# Patient Record
Sex: MALE | Race: WHITE | NOT HISPANIC OR LATINO | Employment: OTHER | URBAN - METROPOLITAN AREA
[De-identification: names, ages, dates, MRNs, and addresses within clinical notes are randomized per-mention and may not be internally consistent; named-entity substitution may affect disease eponyms.]

---

## 2022-08-25 ENCOUNTER — OFFICE VISIT (OUTPATIENT)
Dept: GASTROENTEROLOGY | Facility: CLINIC | Age: 82
End: 2022-08-25
Payer: OTHER GOVERNMENT

## 2022-08-25 ENCOUNTER — LAB VISIT (OUTPATIENT)
Dept: LAB | Facility: HOSPITAL | Age: 82
End: 2022-08-25
Attending: INTERNAL MEDICINE
Payer: OTHER GOVERNMENT

## 2022-08-25 VITALS — HEIGHT: 68 IN | WEIGHT: 168.19 LBS | BODY MASS INDEX: 25.49 KG/M2

## 2022-08-25 DIAGNOSIS — K51.911 ULCERATIVE COLITIS WITH RECTAL BLEEDING, UNSPECIFIED LOCATION: ICD-10-CM

## 2022-08-25 DIAGNOSIS — K51.911 ULCERATIVE COLITIS WITH RECTAL BLEEDING, UNSPECIFIED LOCATION: Primary | ICD-10-CM

## 2022-08-25 PROCEDURE — 99999 PR PBB SHADOW E&M-NEW PATIENT-LVL III: CPT | Mod: PBBFAC,,, | Performed by: INTERNAL MEDICINE

## 2022-08-25 PROCEDURE — 99204 OFFICE O/P NEW MOD 45 MIN: CPT | Mod: S$PBB,,, | Performed by: INTERNAL MEDICINE

## 2022-08-25 PROCEDURE — 86480 TB TEST CELL IMMUN MEASURE: CPT | Performed by: INTERNAL MEDICINE

## 2022-08-25 PROCEDURE — 99204 PR OFFICE/OUTPT VISIT, NEW, LEVL IV, 45-59 MIN: ICD-10-PCS | Mod: S$PBB,,, | Performed by: INTERNAL MEDICINE

## 2022-08-25 PROCEDURE — 99203 OFFICE O/P NEW LOW 30 MIN: CPT | Mod: PBBFAC,PO | Performed by: INTERNAL MEDICINE

## 2022-08-25 PROCEDURE — 99999 PR PBB SHADOW E&M-NEW PATIENT-LVL III: ICD-10-PCS | Mod: PBBFAC,,, | Performed by: INTERNAL MEDICINE

## 2022-08-25 RX ORDER — MONTELUKAST SODIUM 4 MG/1
2 TABLET, CHEWABLE ORAL
COMMUNITY
Start: 2022-01-13 | End: 2023-01-23

## 2022-08-25 RX ORDER — AMOXICILLIN 500 MG
CAPSULE ORAL
COMMUNITY
End: 2022-08-25

## 2022-08-25 RX ORDER — FLUTICASONE PROPIONATE AND SALMETEROL 250; 50 UG/1; UG/1
POWDER RESPIRATORY (INHALATION)
COMMUNITY

## 2022-08-25 RX ORDER — FAMOTIDINE 40 MG/1
40 TABLET, FILM COATED ORAL
COMMUNITY
Start: 2022-03-07

## 2022-08-25 RX ORDER — FENTANYL/BUPIVACAINE/NS/PF 2MCG/ML-.1
PREFILLED PUMP RESERVOIR EPIDURAL
COMMUNITY
End: 2022-08-25

## 2022-08-25 RX ORDER — CLOPIDOGREL BISULFATE 75 MG/1
75 TABLET ORAL
COMMUNITY
Start: 2022-02-08

## 2022-08-25 RX ORDER — NAPROXEN SODIUM 220 MG/1
1 TABLET, FILM COATED ORAL
COMMUNITY

## 2022-08-25 RX ORDER — TAMSULOSIN HYDROCHLORIDE 0.4 MG/1
CAPSULE ORAL
COMMUNITY

## 2022-08-25 RX ORDER — VIT C/E/ZN/COPPR/LUTEIN/ZEAXAN 250MG-90MG
1000 CAPSULE ORAL
COMMUNITY

## 2022-08-25 RX ORDER — ESCITALOPRAM OXALATE 10 MG/1
1 TABLET ORAL DAILY
COMMUNITY
Start: 2022-02-23

## 2022-08-25 RX ORDER — SULFASALAZINE 500 MG/1
1000 TABLET ORAL 3 TIMES DAILY
COMMUNITY
Start: 2022-06-01 | End: 2023-01-23

## 2022-08-27 LAB
GAMMA INTERFERON BACKGROUND BLD IA-ACNC: 0.03 IU/ML
M TB IFN-G CD4+ BCKGRND COR BLD-ACNC: -0.01 IU/ML
MITOGEN IGNF BCKGRD COR BLD-ACNC: 9.97 IU/ML
TB GOLD PLUS: NEGATIVE
TB2 - NIL: 0 IU/ML

## 2022-08-27 NOTE — PROGRESS NOTES
CC:ulcerative colitis    HPI 81 y.o. male with history of GERD, hyperlipidemia, JUAN DIEGO, diverticulosis and colon polyps who presents for evaluation of left-sided chronic ulcerative colitis associated with watery and bloody diarrhea that has been progressively worsening.  He states he has had a longstanding history of ulcerative colitis since the 1960s. Reports one major flare up at that time, otherwise well controlled. On sulfasalazine for 20 years & then switched to mesalamine  which worked well. He has never been on biologic therapy.  He denies any injectable or infusion medications.  He had been doing relatively well until a few years ago when he started having diarrhea.  Previously had been seeing Dr. Pat for management.  Last colonoscopy was in April of this year.  He was tried on Uceris as well as steroids (a Medrol Dosepak).  He is currently off steroids now and on sulfasalazine total 3 g daily.  He states the sulfasalazine gives him terrible headaches.  He reports he is also on colestipol 2 tabs twice daily.  No improvement in the diarrhea has been noted. He does report an improvement in urgency.  He has diarrhea about 3 times per day as well as nighttime symptoms. He wakes up in the middle of the night with diarrhea. Last saw blood in his stool about 5 days ago.    Has medical history of CAD s/p stent 2017 at [place] and remains on ASA, Plavix. He also has cervical spine disease, DJD and has had bilateral inguinal hernia repair as well as glaucoma    Medical records reviewed. Additional history supplemented by nursing.     Colonoscopy in our system 2016 with IH, diverticulosis of SC, otherwise normal. Reports he had a colonoscopy in April and we are trying to get those records.    Past Medical History:   Diagnosis Date    Arthritis     B12 deficiency     Cholelithiasis     Chronic back pain     Colon polyp     Diverticulosis     GERD (gastroesophageal reflux disease)     Glaucoma     left eye     "Hyperlipemia     JUAN DIEGO (obstructive sleep apnea)     denies CPAP use    Skin cancer     Ulcerative colitis 1961    Vitamin D deficiency      Past Surgical History:   Procedure Laterality Date    BACK SURGERY      CATARACT EXTRACTION W/ INTRAOCULAR LENS IMPLANT Right     COLONOSCOPY  2014    COLONOSCOPY  03/25/2015    diverticulosis, otherwise normal findings, biopsies taken from 4 quadrants- see media section for biopsy report, repeat in 1 year    COLONOSCOPY N/A 6/1/2016    Procedure: COLONOSCOPY;  Surgeon: Ravindra Ervin MD;  Location: [facility];  Service: Endoscopy;  Laterality: N/A;    HERNIA REPAIR      rt inguinal    LUMBAR FUSION      L3,4,5    MULTIPLE TOOTH EXTRACTIONS      pain pump placement  04/08/2010    dilaudid/baclofen    SKIN BIOPSY      SKIN CANCER EXCISION      UPPER GASTROINTESTINAL ENDOSCOPY  10/26/2009    hiatal hernia     Social History  Social History     Tobacco Use    Smoking status: Never    Smokeless tobacco: Never   Substance Use Topics    Alcohol use: No    Drug use: No     Family History   Problem Relation Age of Onset    Ulcerative colitis Son     Colon cancer Neg Hx     Colon polyps Neg Hx      Review of Systems  General ROS: +weakness, negative for chills, fever or weight loss  Psychological ROS: negative for hallucination, depression or suicidal ideation  Ophthalmic ROS: negative for blurry vision, photophobia or eye pain  ENT ROS: negative for epistaxis, sore throat or rhinorrhea  Respiratory ROS: no cough, shortness of breath, or wheezing  Cardiovascular ROS: no chest pain or dyspnea on exertion  Gastrointestinal ROS: no abdominal pain,   +change in bowel habits, +blood in stool, +diarrhea,   Genito-Urinary ROS: no dysuria, trouble voiding, or hematuria  Musculoskeletal ROS: negative for gait disturbance or muscular weakness  Neurological ROS: no syncope or seizures; no ataxia  Dermatological ROS: negative for pruritis, rash and jaundice    Physical Examination  Ht 5' 8" " (1.727 m)   Wt 76.3 kg (168 lb 3.4 oz)   BMI 25.58 kg/m²   General appearance: alert, cooperative, no distress  HENT: Normocephalic, atraumatic, neck symmetrical, no nasal discharge   Eyes: conjunctivae/corneas clear, PERRL, EOM's intact  Lungs: no labored breathing, symmetric chest wall expansion bilaterally  Heart: regular rate and rhythm without rub; no displacement of the PMI   Abdomen: soft, non-tender; bowel sounds normoactive; no organomegaly  Extremities: extremities symmetric; no clubbing, cyanosis, or edema  Integument: Skin color, texture, turgor normal; no rashes; hair distrubution normal  Neurologic: Alert and oriented X 3, did not test gait  Psychiatric: no pressured speech; normal affect; no evidence of impaired cognition     Labs:  Lab Results   Component Value Date    WBC 10.08 08/25/2022    HGB 12.3 (L) 08/25/2022    HCT 36.5 (L) 08/25/2022    MCV 93 08/25/2022     08/25/2022     CMP  Sodium   Date Value Ref Range Status   08/25/2022 143 136 - 145 mmol/L Final     Potassium   Date Value Ref Range Status   08/25/2022 4.1 3.5 - 5.1 mmol/L Final     Chloride   Date Value Ref Range Status   08/25/2022 104 95 - 110 mmol/L Final     CO2   Date Value Ref Range Status   08/25/2022 31 (H) 23 - 29 mmol/L Final     Glucose   Date Value Ref Range Status   08/25/2022 91 70 - 110 mg/dL Final     BUN   Date Value Ref Range Status   08/25/2022 11 8 - 23 mg/dL Final     Creatinine   Date Value Ref Range Status   08/25/2022 1.2 0.5 - 1.4 mg/dL Final     Calcium   Date Value Ref Range Status   08/25/2022 9.7 8.7 - 10.5 mg/dL Final     Total Protein   Date Value Ref Range Status   08/25/2022 7.1 6.0 - 8.4 g/dL Final     Albumin   Date Value Ref Range Status   08/25/2022 4.0 3.5 - 5.2 g/dL Final     Total Bilirubin   Date Value Ref Range Status   08/25/2022 0.5 0.1 - 1.0 mg/dL Final     Comment:     For infants and newborns, interpretation of results should be based  on gestational age, weight and in agreement  with clinical  observations.    Premature Infant recommended reference ranges:  Up to 24 hours.............<8.0 mg/dL  Up to 48 hours............<12.0 mg/dL  3-5 days..................<15.0 mg/dL  6-29 days.................<15.0 mg/dL       Alkaline Phosphatase   Date Value Ref Range Status   08/25/2022 56 55 - 135 U/L Final     AST   Date Value Ref Range Status   08/25/2022 18 10 - 40 U/L Final     ALT   Date Value Ref Range Status   08/25/2022 9 (L) 10 - 44 U/L Final     Anion Gap   Date Value Ref Range Status   08/25/2022 8 8 - 16 mmol/L Final     Assessment:   Left sided ulcerative colitis  Diarrhea  Blood in stool  On Sulfasalazine 3000 mg dialy in divided doses  Headaches from sulfasalazine    Plan:   -Will obtain records from OSH for evaluation of recent colonoscopy and pathology  -Fecal calprotectin, CRP  -CBC, CMP  -Check c.diff, stool culture, ova/parasites, stool WBC   -Hepatitis serology  -TB testing  -Recommendations to follow endoscopy  -Follow up in 1 month    Lencho Ivory MD  [facility]  [address] [place] [address]  Office: [phone]  Fax: [phone]

## 2022-08-29 ENCOUNTER — TELEPHONE (OUTPATIENT)
Dept: GASTROENTEROLOGY | Facility: CLINIC | Age: 82
End: 2022-08-29
Payer: OTHER GOVERNMENT

## 2022-08-29 NOTE — TELEPHONE ENCOUNTER
Tried to return call, the aneesh was dropped.    Tried again a few minutes later and the line was busy.   He was given prep instructions at his visit on 8/25.

## 2022-08-29 NOTE — TELEPHONE ENCOUNTER
----- Message from Mann Tovar sent at 8/29/2022  9:22 AM CDT -----  Type: Needs Medical Advice  Who Called:  Patient    Pharmacy name and phone #:    Best Call Back Number: [phone]  Additional Information: Patient states that he has a colonoscopy this week but he has no prep medication.  Please call to advise

## 2022-08-31 ENCOUNTER — LAB VISIT (OUTPATIENT)
Dept: LAB | Facility: HOSPITAL | Age: 82
End: 2022-08-31
Attending: INTERNAL MEDICINE
Payer: OTHER GOVERNMENT

## 2022-08-31 DIAGNOSIS — K51.911 ULCERATIVE COLITIS WITH RECTAL BLEEDING, UNSPECIFIED LOCATION: ICD-10-CM

## 2022-08-31 LAB
C DIFF GDH STL QL: NEGATIVE
C DIFF TOX A+B STL QL IA: NEGATIVE
WBC #/AREA STL HPF: NORMAL /[HPF]

## 2022-08-31 PROCEDURE — 87045 FECES CULTURE AEROBIC BACT: CPT | Performed by: INTERNAL MEDICINE

## 2022-08-31 PROCEDURE — 87427 SHIGA-LIKE TOXIN AG IA: CPT | Performed by: INTERNAL MEDICINE

## 2022-08-31 PROCEDURE — 87046 STOOL CULTR AEROBIC BACT EA: CPT | Mod: 59 | Performed by: INTERNAL MEDICINE

## 2022-08-31 PROCEDURE — 87449 NOS EACH ORGANISM AG IA: CPT | Performed by: INTERNAL MEDICINE

## 2022-08-31 PROCEDURE — 89055 LEUKOCYTE ASSESSMENT FECAL: CPT | Performed by: INTERNAL MEDICINE

## 2022-08-31 PROCEDURE — 87209 SMEAR COMPLEX STAIN: CPT | Performed by: INTERNAL MEDICINE

## 2022-09-01 ENCOUNTER — ANESTHESIA (OUTPATIENT)
Dept: ENDOSCOPY | Facility: HOSPITAL | Age: 82
End: 2022-09-01
Payer: OTHER GOVERNMENT

## 2022-09-01 ENCOUNTER — HOSPITAL ENCOUNTER (OUTPATIENT)
Facility: HOSPITAL | Age: 82
Discharge: HOME | End: 2022-09-01
Attending: INTERNAL MEDICINE | Admitting: INTERNAL MEDICINE
Payer: OTHER GOVERNMENT

## 2022-09-01 ENCOUNTER — ANESTHESIA EVENT (OUTPATIENT)
Dept: ENDOSCOPY | Facility: HOSPITAL | Age: 82
End: 2022-09-01
Payer: OTHER GOVERNMENT

## 2022-09-01 DIAGNOSIS — K51.919 ULCERATIVE COLITIS WITH COMPLICATION: ICD-10-CM

## 2022-09-01 DIAGNOSIS — K51.819 OTHER ULCERATIVE COLITIS WITH COMPLICATION: Primary | ICD-10-CM

## 2022-09-01 LAB
E COLI SXT1 STL QL IA: NEGATIVE
E COLI SXT2 STL QL IA: NEGATIVE

## 2022-09-01 PROCEDURE — 88365 INSITU HYBRIDIZATION (FISH): CPT | Performed by: PATHOLOGY

## 2022-09-01 PROCEDURE — 63600175 PHARM REV CODE 636 W HCPCS: Mod: PO | Performed by: NURSE ANESTHETIST, CERTIFIED REGISTERED

## 2022-09-01 PROCEDURE — 88313 SPECIAL STAINS GROUP 2: CPT | Performed by: PATHOLOGY

## 2022-09-01 PROCEDURE — 88341 PR IHC OR ICC EACH ADD'L SINGLE ANTIBODY  STAINPR: ICD-10-PCS | Mod: 26,,, | Performed by: PATHOLOGY

## 2022-09-01 PROCEDURE — 45380 COLONOSCOPY AND BIOPSY: CPT | Mod: PO | Performed by: INTERNAL MEDICINE

## 2022-09-01 PROCEDURE — 00811 ANES LWR INTST NDSC NOS: CPT | Mod: PO | Performed by: INTERNAL MEDICINE

## 2022-09-01 PROCEDURE — 37000008 HC ANESTHESIA 1ST 15 MINUTES: Mod: PO | Performed by: INTERNAL MEDICINE

## 2022-09-01 PROCEDURE — 88312 SPECIAL STAINS GROUP 1: CPT | Mod: 26,,, | Performed by: PATHOLOGY

## 2022-09-01 PROCEDURE — 27201012 HC FORCEPS, HOT/COLD, DISP: Mod: PO | Performed by: INTERNAL MEDICINE

## 2022-09-01 PROCEDURE — 88365 INSITU HYBRIDIZATION (FISH): CPT | Mod: 26,,, | Performed by: PATHOLOGY

## 2022-09-01 PROCEDURE — 88313 PR  SPECIAL STAINS,GROUP II: ICD-10-PCS | Mod: 26,,, | Performed by: PATHOLOGY

## 2022-09-01 PROCEDURE — D9220A PRA ANESTHESIA: Mod: ANES,,, | Performed by: ANESTHESIOLOGY

## 2022-09-01 PROCEDURE — 88342 CHG IMMUNOCYTOCHEMISTRY: ICD-10-PCS | Mod: 26,,, | Performed by: PATHOLOGY

## 2022-09-01 PROCEDURE — 88305 TISSUE EXAM BY PATHOLOGIST: CPT | Mod: 59 | Performed by: PATHOLOGY

## 2022-09-01 PROCEDURE — 25000003 PHARM REV CODE 250: Mod: PO | Performed by: NURSE ANESTHETIST, CERTIFIED REGISTERED

## 2022-09-01 PROCEDURE — 88341 IMHCHEM/IMCYTCHM EA ADD ANTB: CPT | Mod: 59 | Performed by: PATHOLOGY

## 2022-09-01 PROCEDURE — D9220A PRA ANESTHESIA: Mod: CRNA,,, | Performed by: NURSE ANESTHETIST, CERTIFIED REGISTERED

## 2022-09-01 PROCEDURE — 63600175 PHARM REV CODE 636 W HCPCS: Mod: PO | Performed by: INTERNAL MEDICINE

## 2022-09-01 PROCEDURE — 88312 SPECIAL STAINS GROUP 1: CPT | Performed by: PATHOLOGY

## 2022-09-01 PROCEDURE — 88342 IMHCHEM/IMCYTCHM 1ST ANTB: CPT | Mod: 26,,, | Performed by: PATHOLOGY

## 2022-09-01 PROCEDURE — 45380 COLONOSCOPY AND BIOPSY: CPT | Mod: ,,, | Performed by: INTERNAL MEDICINE

## 2022-09-01 PROCEDURE — D9220A PRA ANESTHESIA: ICD-10-PCS | Mod: CRNA,,, | Performed by: NURSE ANESTHETIST, CERTIFIED REGISTERED

## 2022-09-01 PROCEDURE — 88305 TISSUE EXAM BY PATHOLOGIST: ICD-10-PCS | Mod: 26,,, | Performed by: PATHOLOGY

## 2022-09-01 PROCEDURE — 88342 IMHCHEM/IMCYTCHM 1ST ANTB: CPT | Performed by: PATHOLOGY

## 2022-09-01 PROCEDURE — 88365 PR  TISSUE HYBRIDIZATION: ICD-10-PCS | Mod: 26,,, | Performed by: PATHOLOGY

## 2022-09-01 PROCEDURE — 37000009 HC ANESTHESIA EA ADD 15 MINS: Mod: PO | Performed by: INTERNAL MEDICINE

## 2022-09-01 PROCEDURE — D9220A PRA ANESTHESIA: ICD-10-PCS | Mod: ANES,,, | Performed by: ANESTHESIOLOGY

## 2022-09-01 PROCEDURE — 88341 IMHCHEM/IMCYTCHM EA ADD ANTB: CPT | Mod: 26,,, | Performed by: PATHOLOGY

## 2022-09-01 PROCEDURE — 88305 TISSUE EXAM BY PATHOLOGIST: CPT | Mod: 26,,, | Performed by: PATHOLOGY

## 2022-09-01 PROCEDURE — 88313 SPECIAL STAINS GROUP 2: CPT | Mod: 26,,, | Performed by: PATHOLOGY

## 2022-09-01 PROCEDURE — 88312 PR  SPECIAL STAINS,GROUP I: ICD-10-PCS | Mod: 26,,, | Performed by: PATHOLOGY

## 2022-09-01 PROCEDURE — 45380 PR COLONOSCOPY,BIOPSY: ICD-10-PCS | Mod: ,,, | Performed by: INTERNAL MEDICINE

## 2022-09-01 RX ORDER — LIDOCAINE HCL/PF 100 MG/5ML
SYRINGE (ML) INTRAVENOUS
Status: DISCONTINUED | OUTPATIENT
Start: 2022-09-01 | End: 2022-09-01

## 2022-09-01 RX ORDER — PROPOFOL 10 MG/ML
VIAL (ML) INTRAVENOUS
Status: DISCONTINUED | OUTPATIENT
Start: 2022-09-01 | End: 2022-09-01

## 2022-09-01 RX ORDER — SODIUM CHLORIDE 0.9 % (FLUSH) 0.9 %
10 SYRINGE (ML) INJECTION EVERY 6 HOURS PRN
Status: DISCONTINUED | OUTPATIENT
Start: 2022-09-01 | End: 2022-09-01 | Stop reason: HOSPADM

## 2022-09-01 RX ORDER — PROPOFOL 10 MG/ML
VIAL (ML) INTRAVENOUS CONTINUOUS PRN
Status: DISCONTINUED | OUTPATIENT
Start: 2022-09-01 | End: 2022-09-01

## 2022-09-01 RX ORDER — SODIUM CHLORIDE, SODIUM LACTATE, POTASSIUM CHLORIDE, CALCIUM CHLORIDE 600; 310; 30; 20 MG/100ML; MG/100ML; MG/100ML; MG/100ML
INJECTION, SOLUTION INTRAVENOUS CONTINUOUS
Status: DISCONTINUED | OUTPATIENT
Start: 2022-09-01 | End: 2022-09-01 | Stop reason: HOSPADM

## 2022-09-01 RX ORDER — EPHEDRINE SULFATE 50 MG/ML
INJECTION, SOLUTION INTRAVENOUS
Status: DISCONTINUED | OUTPATIENT
Start: 2022-09-01 | End: 2022-09-01

## 2022-09-01 RX ADMIN — EPHEDRINE SULFATE 10 MG: 50 INJECTION, SOLUTION INTRAMUSCULAR; INTRAVENOUS; SUBCUTANEOUS at 09:09

## 2022-09-01 RX ADMIN — LIDOCAINE HYDROCHLORIDE 100 MG: 20 INJECTION, SOLUTION INTRAVENOUS at 09:09

## 2022-09-01 RX ADMIN — SODIUM CHLORIDE, SODIUM LACTATE, POTASSIUM CHLORIDE, AND CALCIUM CHLORIDE: .6; .31; .03; .02 INJECTION, SOLUTION INTRAVENOUS at 08:09

## 2022-09-01 RX ADMIN — PROPOFOL 100 MCG/KG/MIN: 10 INJECTION, EMULSION INTRAVENOUS at 09:09

## 2022-09-01 RX ADMIN — PROPOFOL 80 MG: 10 INJECTION, EMULSION INTRAVENOUS at 09:09

## 2022-09-01 NOTE — PROVATION PATIENT INSTRUCTIONS
Discharge Summary/Instructions after an Endoscopic Procedure  Patient Name: Alfie Olson  Patient MRN: [id number]  Patient YOB: 1940  Thursday, September 1, 2022  Lencho Ivory MD  Dear patient,  As a result of recent federal legislation (The Federal Cures Act), you may   receive lab or pathology results from your procedure in your [facility]   account before your physician is able to contact you. Your physician or   their representative will relay the results to you with their   recommendations at their soonest availability.  Thank you,  RESTRICTIONS:  During your procedure today, you received medications for sedation.  These   medications may affect your judgment, balance and coordination.  Therefore,   for 24 hours, you have the following restrictions:   - DO NOT drive a car, operate machinery, make legal/financial decisions,   sign important papers or drink alcohol.    ACTIVITY:  Today: no heavy lifting, straining or running due to procedural   sedation/anesthesia.  The following day: return to full activity including work.  DIET:  Eat and drink normally unless instructed otherwise.     TREATMENT FOR COMMON SIDE EFFECTS:  - Mild abdominal pain, nausea, belching, bloating or excessive gas:  rest,   eat lightly and use a heating pad.  - Sore Throat: treat with throat lozenges and/or gargle with warm salt   water.  - Because air was used during the procedure, expelling large amounts of air   from your rectum or belching is normal.  - If a bowel prep was taken, you may not have a bowel movement for 1-3 days.    This is normal.  SYMPTOMS TO WATCH FOR AND REPORT TO YOUR PHYSICIAN:  1. Abdominal pain or bloating, other than gas cramps.  2. Chest pain.  3. Back pain.  4. Signs of infection such as: chills or fever occurring within 24 hours   after the procedure.  5. Rectal bleeding, which would show as bright red, maroon, or black stools.   (A tablespoon of blood from the rectum is not serious,  especially if   hemorrhoids are present.)  6. Vomiting.  7. Weakness or dizziness.  GO DIRECTLY TO THE NEAREST EMERGENCY ROOM IF YOU HAVE ANY OF THE FOLLOWING:      Difficulty breathing              Chills and/or fever over 101 F   Persistent vomiting and/or vomiting blood   Severe abdominal pain   Severe chest pain   Black, tarry stools   Bleeding- more than one tablespoon   Any other symptom or condition that you feel may need urgent attention  Your doctor recommends these additional instructions:  If any biopsies were taken, your doctors clinic will contact you in 1 to 2   weeks with any results.  Your physician has recommended a repeat colonoscopy (date to be determined   after pending pathology results are reviewed) for surveillance.   You are being discharged to home.   Advance your diet as tolerated.   Continue your present medications.   We are waiting for your pathology results.  For questions, problems or results please call your physician - Lencho Ivory MD at Work:  [phone].  EMERGENCY PHONE NUMBER: [phone], LAB RESULTS: [phone]  IF A COMPLICATION OR EMERGENCY SITUATION ARISES AND YOU ARE UNABLE TO REACH   YOUR PHYSICIAN - GO DIRECTLY TO THE EMERGENCY ROOM.  ___________________________________________  Nurse Signature  ___________________________________________  Patient/Designated Responsible Party Signature  Lencho Ivory MD  9/1/2022 9:50:32 AM  This report has been verified and signed electronically.  Dear patient,  As a result of recent federal legislation (The Federal Cures Act), you may   receive lab or pathology results from your procedure in your [facility]   account before your physician is able to contact you. Your physician or   their representative will relay the results to you with their   recommendations at their soonest availability.  Thank you.  PROVATION

## 2022-09-01 NOTE — H&P
History & Physical - Short Stay  Gastroenterology    SUBJECTIVE:     Procedure: Colonoscopy    Chief Complaint/Indication for Procedure: Ulcerative colitis    PTA Medications   Medication Sig    aspirin 81 MG Chew 1 tablet.    cholecalciferol, vitamin D3, (VITAMIN D3) 25 mcg (1,000 unit) capsule Take 1,000 Units by mouth.    clopidogreL (PLAVIX) 75 mg tablet 75 mg.    CYANOCOBALAMIN, VITAMIN B-12, (VITAMIN B-12 ORAL) Take by mouth.    EScitalopram oxalate (LEXAPRO) 10 MG tablet Take 1 tablet by mouth once daily.    famotidine (PEPCID) 40 MG tablet 40 mg.    fluticasone-salmeterol diskus inhaler 250-50 mcg Wixela Inhub 250 mcg-50 mcg/dose powder for inhalation   Inhale 1 puff twice a day by inhalation route.    folic acid (FOLVITE) 1 MG tablet Take 1 mg by mouth once daily.    latanoprost 0.005 % ophthalmic solution Place 1 drop into both eyes every evening.    omega 3-dha-epa-fish oil 500-1,000 mg Cap Take 3,000 mg by mouth 2 (two) times daily.    tamsulosin (FLOMAX) 0.4 mg Cap tamsulosin 0.4 mg capsule    albuterol (PROVENTIL) 5 mg/mL nebulizer solution Take 2.5 mg by nebulization every 6 (six) hours as needed for Wheezing.    colestipoL (COLESTID) 1 gram Tab 2 g.    multivitamin (THERAGRAN) per tablet Take 1 tablet by mouth once daily.    simvastatin (ZOCOR) 80 MG tablet Take 40 mg by mouth every evening.    sodium chloride 0.9% 0.9 % SolP 100 mL with HYDROmorphone 10 mg/mL Soln 100 mg Inject 400 mg/hr into the vein continuous.    sulfaSALAzine (AZULFIDINE) 500 mg Tab Take 1,000 mg by mouth 3 (three) times daily.    tizanidine 4 mg Cap Take 4 mg by mouth 2 (two) times daily.       Review of patient's allergies indicates:  No Known Allergies     Past Medical History:   Diagnosis Date    Arthritis     B12 deficiency     Cholelithiasis     Chronic back pain     Colon polyp     Diverticulosis     GERD (gastroesophageal reflux disease)     Glaucoma     left eye    Hyperlipemia     JUAN DIEGO (obstructive sleep apnea)      denies CPAP use    Skin cancer     Ulcerative colitis 1961    Vitamin D deficiency      Past Surgical History:   Procedure Laterality Date    BACK SURGERY      CATARACT EXTRACTION W/ INTRAOCULAR LENS IMPLANT Right     COLONOSCOPY  2014    COLONOSCOPY  03/25/2015    diverticulosis, otherwise normal findings, biopsies taken from 4 quadrants- see media section for biopsy report, repeat in 1 year    COLONOSCOPY N/A 6/1/2016    Procedure: COLONOSCOPY;  Surgeon: Ravindra Ervin MD;  Location: [facility];  Service: Endoscopy;  Laterality: N/A;    HERNIA REPAIR      rt inguinal    LUMBAR FUSION      L3,4,5    MULTIPLE TOOTH EXTRACTIONS      pain pump placement  04/08/2010    dilaudid/baclofen    SKIN BIOPSY      SKIN CANCER EXCISION      UPPER GASTROINTESTINAL ENDOSCOPY  10/26/2009    hiatal hernia     Family History   Problem Relation Age of Onset    Ulcerative colitis Son     Colon cancer Neg Hx     Colon polyps Neg Hx      Social History     Tobacco Use    Smoking status: Never    Smokeless tobacco: Never   Substance Use Topics    Alcohol use: No    Drug use: No         OBJECTIVE:     Vital Signs (Most Recent)  Temp: 98 °F (36.7 °C) (09/01/22 0849)  Pulse: 68 (09/01/22 0849)  Resp: 18 (09/01/22 0849)  BP: 130/63 (09/01/22 0849)  SpO2: 96 % (09/01/22 0849)    Physical Exam:                                                       GENERAL:  Comfortable, in no acute distress.                                 HEENT EXAM:  Nonicteric.  No adenopathy.  Oropharynx is clear.               NECK:  Supple.                                                               LUNGS:  Clear.                                                               CARDIAC:  Regular rate and rhythm.  S1, S2.  No murmur.                      ABDOMEN:  Soft, positive bowel sounds, nontender.  No hepatosplenomegaly or masses.  No rebound or guarding.                                             EXTREMITIES:  No edema.     MENTAL STATUS:  Normal, alert and  oriented.      ASSESSMENT/PLAN:     Assessment: Ulcerative colitis    Plan: Colonoscopy

## 2022-09-01 NOTE — ANESTHESIA PREPROCEDURE EVALUATION
09/01/2022  Alfie Olson is a 81 y.o., male.      Pre-op Assessment    I have reviewed the NPO Status.   I have reviewed the Medications.     Review of Systems  Anesthesia Hx:  No problems with previous Anesthesia    EENT/Dental:   Hearing aids   Cardiovascular:   Exercise tolerance: good CABG/stent    Pulmonary:   Sleep Apnea    Hepatic/GI:   Bowel Prep. PUD, GERD, well controlled    Neurological:  Neurology Normal        Physical Exam  General: Well nourished    Airway:  Mouth Opening: Normal    Dental:  Dentures        Anesthesia Plan  Type of Anesthesia, risks & benefits discussed:    Anesthesia Type: Gen Natural Airway  Intra-op Monitoring Plan: Standard ASA Monitors  Induction:  IV  Informed Consent: Informed consent signed with the Patient and all parties understand the risks and agree with anesthesia plan.  All questions answered. Patient consented to blood products? No  ASA Score: 3    Ready For Surgery From Anesthesia Perspective.     .

## 2022-09-01 NOTE — ANESTHESIA POSTPROCEDURE EVALUATION
Anesthesia Post Evaluation    Patient: Alfie Olson    Procedure(s) Performed: Procedure(s) (LRB):  COLONOSCOPY (N/A)    Final Anesthesia Type: general      Patient location during evaluation: PACU  Patient participation: Yes- Able to Participate  Level of consciousness: awake and alert and oriented  Post-procedure vital signs: reviewed and stable  Pain management: adequate  Airway patency: patent    PONV status at discharge: No PONV  Anesthetic complications: no      Cardiovascular status: blood pressure returned to baseline  Respiratory status: unassisted, spontaneous ventilation and room air  Hydration status: euvolemic  Follow-up not needed.          Vitals Value Taken Time   /63 09/01/22 1005   Temp 36.4 °C (97.6 °F) 09/01/22 1005   Pulse 67 09/01/22 1005   Resp 16 09/01/22 1005   SpO2 96 % 09/01/22 1005         No case tracking events are documented in the log.      Pain/Loly Score: Loly Score: 10 (9/1/2022  9:57 AM)

## 2022-09-01 NOTE — TRANSFER OF CARE
"Anesthesia Transfer of Care Note    Patient: Alfie Olson    Procedure(s) Performed: Procedure(s) (LRB):  COLONOSCOPY (N/A)    Patient location: PACU    Anesthesia Type: general    Transport from OR: Transported from OR on room air with adequate spontaneous ventilation    Post pain: adequate analgesia    Post assessment: no apparent anesthetic complications and tolerated procedure well    Post vital signs: stable    Level of consciousness: responds to stimulation    Nausea/Vomiting: no nausea/vomiting    Complications: none    Transfer of care protocol was followed      Last vitals:   Visit Vitals  /63   Pulse 68   Temp 36.7 °C (98 °F)   Resp 18   Ht 5' 8" (1.727 m)   Wt 79.8 kg (176 lb)   SpO2 96%   BMI 26.76 kg/m²     "

## 2022-09-02 VITALS
OXYGEN SATURATION: 96 % | RESPIRATION RATE: 16 BRPM | HEIGHT: 68 IN | TEMPERATURE: 98 F | BODY MASS INDEX: 26.67 KG/M2 | SYSTOLIC BLOOD PRESSURE: 122 MMHG | HEART RATE: 67 BPM | WEIGHT: 176 LBS | DIASTOLIC BLOOD PRESSURE: 63 MMHG

## 2022-09-03 LAB — BACTERIA STL CULT: NORMAL

## 2022-09-06 LAB — O+P STL MICRO: NORMAL

## 2022-09-19 LAB
FINAL PATHOLOGIC DIAGNOSIS: NORMAL
GROSS: NORMAL
Lab: NORMAL
MICROSCOPIC EXAM: NORMAL
SUPPLEMENTAL DIAGNOSIS: NORMAL

## 2022-09-27 ENCOUNTER — TELEPHONE (OUTPATIENT)
Dept: GASTROENTEROLOGY | Facility: CLINIC | Age: 82
End: 2022-09-27
Payer: OTHER GOVERNMENT

## 2022-09-27 NOTE — TELEPHONE ENCOUNTER
----- Message from Karen Colin sent at 9/27/2022 11:21 AM CDT -----  Contact: [phone]  Type: Needs Medical Advice  Who Called:  pt   Best Call Back Number: [phone]    Additional Information: pt called in to speak with Dr. Helm to go over some medication please call back to advise also the pt is trying to get an appointment

## 2022-09-27 NOTE — TELEPHONE ENCOUNTER
----- Message from Karen Colin sent at 9/27/2022 11:21 AM CDT -----  Contact: [phone]  Type: Needs Medical Advice  Who Called:  pt   Best Call Back Number: [phone]    Additional Information: pt called in to speak with Dr. Helm to go over some medication please call back to advise also the pt is trying to get an appointment

## 2022-10-13 ENCOUNTER — PATIENT MESSAGE (OUTPATIENT)
Dept: GASTROENTEROLOGY | Facility: CLINIC | Age: 82
End: 2022-10-13
Payer: OTHER GOVERNMENT

## 2022-10-20 ENCOUNTER — OFFICE VISIT (OUTPATIENT)
Dept: GASTROENTEROLOGY | Facility: CLINIC | Age: 82
End: 2022-10-20
Payer: OTHER GOVERNMENT

## 2022-10-20 VITALS — WEIGHT: 164 LBS | BODY MASS INDEX: 24.86 KG/M2 | HEIGHT: 68 IN

## 2022-10-20 DIAGNOSIS — K51.018 ULCERATIVE PANCOLITIS WITH OTHER COMPLICATION: Primary | ICD-10-CM

## 2022-10-20 PROBLEM — K51.90 ULCERATIVE COLITIS: Status: ACTIVE | Noted: 2022-10-20

## 2022-10-20 PROCEDURE — 99999 PR PBB SHADOW E&M-EST. PATIENT-LVL III: ICD-10-PCS | Mod: PBBFAC,,, | Performed by: INTERNAL MEDICINE

## 2022-10-20 PROCEDURE — 99215 PR OFFICE/OUTPT VISIT, EST, LEVL V, 40-54 MIN: ICD-10-PCS | Mod: S$PBB,,, | Performed by: INTERNAL MEDICINE

## 2022-10-20 PROCEDURE — 99215 OFFICE O/P EST HI 40 MIN: CPT | Mod: S$PBB,,, | Performed by: INTERNAL MEDICINE

## 2022-10-20 PROCEDURE — 99213 OFFICE O/P EST LOW 20 MIN: CPT | Mod: PBBFAC,PO | Performed by: INTERNAL MEDICINE

## 2022-10-20 PROCEDURE — 99999 PR PBB SHADOW E&M-EST. PATIENT-LVL III: CPT | Mod: PBBFAC,,, | Performed by: INTERNAL MEDICINE

## 2022-10-20 RX ORDER — METHYLPREDNISOLONE SOD SUCC 125 MG
40 VIAL (EA) INJECTION
Status: CANCELLED | OUTPATIENT
Start: 2022-10-20

## 2022-10-20 RX ORDER — HEPARIN 100 UNIT/ML
500 SYRINGE INTRAVENOUS
Status: CANCELLED | OUTPATIENT
Start: 2022-10-20

## 2022-10-20 RX ORDER — ACETAMINOPHEN 325 MG/1
650 TABLET ORAL
Status: CANCELLED | OUTPATIENT
Start: 2022-10-20

## 2022-10-20 RX ORDER — EPINEPHRINE 1 MG/ML
0.3 INJECTION, SOLUTION, CONCENTRATE INTRAVENOUS
Status: CANCELLED | OUTPATIENT
Start: 2022-10-20

## 2022-10-20 RX ORDER — IPRATROPIUM BROMIDE AND ALBUTEROL SULFATE 2.5; .5 MG/3ML; MG/3ML
3 SOLUTION RESPIRATORY (INHALATION)
Status: CANCELLED | OUTPATIENT
Start: 2022-10-20

## 2022-10-20 RX ORDER — DIPHENHYDRAMINE HYDROCHLORIDE 50 MG/ML
25 INJECTION INTRAMUSCULAR; INTRAVENOUS
Status: CANCELLED | OUTPATIENT
Start: 2022-10-20

## 2022-10-20 RX ORDER — SODIUM CHLORIDE 0.9 % (FLUSH) 0.9 %
10 SYRINGE (ML) INJECTION
Status: CANCELLED | OUTPATIENT
Start: 2022-10-20

## 2022-10-20 NOTE — PROGRESS NOTES
"CC: colitis    HPI 82 y.o. male with history of GERD, hyperlipidemia, JUAN DIEGO, diverticulosis and colon polyps who presents for follow up of previously diagnosed ulcerative colitis associated with watery and bloody diarrhea that has been progressively worsening.     He recently had colonoscopy 9/1/22 with findings of normal TI, mild inflammation throughout the entire colon. Path with mild chronic active colitis throughout the colon with rare microscopic granulomas and rare non-necrotizing micro granulomas.     Taking sulfasalazine 3 g daily without improvement in diarrhea or urgency.     He has diarrhea about 3 times per day as well as nighttime symptoms. He wakes up in the middle of the night with diarrhea.     Past Medical History:   Diagnosis Date    Arthritis     B12 deficiency     Cholelithiasis     Chronic back pain     Colon polyp     Diverticulosis     GERD (gastroesophageal reflux disease)     Glaucoma     left eye    Hyperlipemia     JUAN DIEGO (obstructive sleep apnea)     denies CPAP use    Skin cancer     Ulcerative colitis 1961    Vitamin D deficiency      Review of Systems  General ROS: +weakness, negative for chills, fever or weight loss  Cardiovascular ROS: no chest pain or dyspnea on exertion  Gastrointestinal ROS: no abdominal pain,   +change in bowel habits, +blood in stool, +diarrhea    Physical Examination  Ht 5' 8" (1.727 m)   Wt 74.4 kg (164 lb 0.4 oz)   BMI 24.94 kg/m²   General appearance: alert, cooperative, no distress  HENT: Normocephalic, atraumatic, neck symmetrical  Eyes: conjunctivae clear  Lungs: No labored breathing  Skin: No jaundice  Neurologic: Alert and oriented X 3      Labs:  Lab Results   Component Value Date    WBC 10.08 08/25/2022    HGB 12.3 (L) 08/25/2022    HCT 36.5 (L) 08/25/2022    MCV 93 08/25/2022     08/25/2022     CMP  Sodium   Date Value Ref Range Status   08/25/2022 143 136 - 145 mmol/L Final     Potassium   Date Value Ref Range Status   08/25/2022 4.1 3.5 - 5.1 " mmol/L Final     Chloride   Date Value Ref Range Status   08/25/2022 104 95 - 110 mmol/L Final     CO2   Date Value Ref Range Status   08/25/2022 31 (H) 23 - 29 mmol/L Final     Glucose   Date Value Ref Range Status   08/25/2022 91 70 - 110 mg/dL Final     BUN   Date Value Ref Range Status   08/25/2022 11 8 - 23 mg/dL Final     Creatinine   Date Value Ref Range Status   08/25/2022 1.2 0.5 - 1.4 mg/dL Final     Calcium   Date Value Ref Range Status   08/25/2022 9.7 8.7 - 10.5 mg/dL Final     Total Protein   Date Value Ref Range Status   08/25/2022 7.1 6.0 - 8.4 g/dL Final     Albumin   Date Value Ref Range Status   08/25/2022 4.0 3.5 - 5.2 g/dL Final     Total Bilirubin   Date Value Ref Range Status   08/25/2022 0.5 0.1 - 1.0 mg/dL Final     Comment:     For infants and newborns, interpretation of results should be based  on gestational age, weight and in agreement with clinical  observations.    Premature Infant recommended reference ranges:  Up to 24 hours.............<8.0 mg/dL  Up to 48 hours............<12.0 mg/dL  3-5 days..................<15.0 mg/dL  6-29 days.................<15.0 mg/dL       Alkaline Phosphatase   Date Value Ref Range Status   08/25/2022 56 55 - 135 U/L Final     AST   Date Value Ref Range Status   08/25/2022 18 10 - 40 U/L Final     ALT   Date Value Ref Range Status   08/25/2022 9 (L) 10 - 44 U/L Final     Anion Gap   Date Value Ref Range Status   08/25/2022 8 8 - 16 mmol/L Final     Assessment:   Colitis, suspected to be related to Crohn's disease  Granulomas on biopsies of colon  Diarrhea  Blood in stool  On Sulfasalazine 3000 mg daily but persistent inflammation on recent colonoscopy  Headaches from sulfasalazine    Plan:   -Suspect he truly has colonic Crohn's disease given granulomas on biopsies of colon  -Hepatitis serology and TB testing already performed  -Check fecal calprotectin, did not result last   -He has failed mesalamine, sulfasalazine oral and steroids  -Plan switch of  therapy to Entyvio infusion 300 mg IV induction then every 8 weeks  -He is not a candidate for anti-TNF therapy due to his age and co morbidities; discussed this at length that we will need to choose more gut selective option with minimal risk     40 minutes of total time spent on the encounter, which includes face to face time and non-face to face time preparing to see the patient (eg, review of tests), obtaining and/or reviewing separately obtained history, documenting clinical information in the electronic or other health record, Independently interpreting results (not separately reported) and communicating results to the patient/family/caregiver, or care coordination (not separately reported).          Lencho Ivory MD  [facility]  [address] [place] [address]  Office: [phone]  Fax: [phone]

## 2022-10-21 ENCOUNTER — LAB VISIT (OUTPATIENT)
Dept: LAB | Facility: HOSPITAL | Age: 82
End: 2022-10-21
Attending: INTERNAL MEDICINE
Payer: OTHER GOVERNMENT

## 2022-10-21 DIAGNOSIS — K51.018 ULCERATIVE PANCOLITIS WITH OTHER COMPLICATION: ICD-10-CM

## 2022-10-21 PROCEDURE — 83993 ASSAY FOR CALPROTECTIN FECAL: CPT | Performed by: INTERNAL MEDICINE

## 2022-10-26 LAB — CALPROTECTIN STL-MCNT: 459 MCG/G

## 2022-10-31 ENCOUNTER — INFUSION (OUTPATIENT)
Dept: INFUSION THERAPY | Facility: HOSPITAL | Age: 82
End: 2022-10-31
Attending: INTERNAL MEDICINE
Payer: OTHER GOVERNMENT

## 2022-10-31 VITALS
HEIGHT: 68 IN | HEART RATE: 56 BPM | RESPIRATION RATE: 16 BRPM | WEIGHT: 167.56 LBS | DIASTOLIC BLOOD PRESSURE: 51 MMHG | BODY MASS INDEX: 25.39 KG/M2 | TEMPERATURE: 98 F | SYSTOLIC BLOOD PRESSURE: 107 MMHG

## 2022-10-31 DIAGNOSIS — K51.018 ULCERATIVE PANCOLITIS WITH OTHER COMPLICATION: Primary | ICD-10-CM

## 2022-10-31 LAB
ALBUMIN SERPL BCP-MCNC: 4 G/DL (ref 3.5–5.2)
ALP SERPL-CCNC: 52 U/L (ref 55–135)
ALT SERPL W/O P-5'-P-CCNC: 12 U/L (ref 10–44)
ANION GAP SERPL CALC-SCNC: 11 MMOL/L (ref 8–16)
AST SERPL-CCNC: 18 U/L (ref 10–40)
BASOPHILS # BLD AUTO: 0.05 K/UL (ref 0–0.2)
BASOPHILS NFR BLD: 0.6 % (ref 0–1.9)
BILIRUB SERPL-MCNC: 0.7 MG/DL (ref 0.1–1)
BUN SERPL-MCNC: 12 MG/DL (ref 8–23)
CALCIUM SERPL-MCNC: 9.6 MG/DL (ref 8.7–10.5)
CHLORIDE SERPL-SCNC: 103 MMOL/L (ref 95–110)
CO2 SERPL-SCNC: 25 MMOL/L (ref 23–29)
CREAT SERPL-MCNC: 1.1 MG/DL (ref 0.5–1.4)
DIFFERENTIAL METHOD: ABNORMAL
EOSINOPHIL # BLD AUTO: 0.3 K/UL (ref 0–0.5)
EOSINOPHIL NFR BLD: 2.8 % (ref 0–8)
ERYTHROCYTE [DISTWIDTH] IN BLOOD BY AUTOMATED COUNT: 13 % (ref 11.5–14.5)
EST. GFR  (NO RACE VARIABLE): >60 ML/MIN/1.73 M^2
GLUCOSE SERPL-MCNC: 95 MG/DL (ref 70–110)
HCT VFR BLD AUTO: 34.7 % (ref 40–54)
HGB BLD-MCNC: 11.7 G/DL (ref 14–18)
IMM GRANULOCYTES # BLD AUTO: 0.02 K/UL (ref 0–0.04)
IMM GRANULOCYTES NFR BLD AUTO: 0.2 % (ref 0–0.5)
LYMPHOCYTES # BLD AUTO: 3.6 K/UL (ref 1–4.8)
LYMPHOCYTES NFR BLD: 40.2 % (ref 18–48)
MCH RBC QN AUTO: 31.4 PG (ref 27–31)
MCHC RBC AUTO-ENTMCNC: 33.7 G/DL (ref 32–36)
MCV RBC AUTO: 93 FL (ref 82–98)
MONOCYTES # BLD AUTO: 0.8 K/UL (ref 0.3–1)
MONOCYTES NFR BLD: 8.6 % (ref 4–15)
NEUTROPHILS # BLD AUTO: 4.2 K/UL (ref 1.8–7.7)
NEUTROPHILS NFR BLD: 47.6 % (ref 38–73)
NRBC BLD-RTO: 0 /100 WBC
PLATELET # BLD AUTO: 334 K/UL (ref 150–450)
PMV BLD AUTO: 9.3 FL (ref 9.2–12.9)
POTASSIUM SERPL-SCNC: 4.3 MMOL/L (ref 3.5–5.1)
PROT SERPL-MCNC: 7.2 G/DL (ref 6–8.4)
RBC # BLD AUTO: 3.73 M/UL (ref 4.6–6.2)
SODIUM SERPL-SCNC: 139 MMOL/L (ref 136–145)
WBC # BLD AUTO: 8.85 K/UL (ref 3.9–12.7)

## 2022-10-31 PROCEDURE — 96365 THER/PROPH/DIAG IV INF INIT: CPT | Mod: PN

## 2022-10-31 PROCEDURE — 80053 COMPREHEN METABOLIC PANEL: CPT | Mod: PN | Performed by: INTERNAL MEDICINE

## 2022-10-31 PROCEDURE — 85025 COMPLETE CBC W/AUTO DIFF WBC: CPT | Mod: PN | Performed by: INTERNAL MEDICINE

## 2022-10-31 PROCEDURE — 25000003 PHARM REV CODE 250: Mod: PN | Performed by: INTERNAL MEDICINE

## 2022-10-31 PROCEDURE — 63600175 PHARM REV CODE 636 W HCPCS: Mod: JG,PN | Performed by: INTERNAL MEDICINE

## 2022-10-31 RX ORDER — HEPARIN 100 UNIT/ML
500 SYRINGE INTRAVENOUS
Status: CANCELLED | OUTPATIENT
Start: 2022-12-20

## 2022-10-31 RX ORDER — SODIUM CHLORIDE 0.9 % (FLUSH) 0.9 %
10 SYRINGE (ML) INJECTION
Status: CANCELLED | OUTPATIENT
Start: 2022-12-20

## 2022-10-31 RX ORDER — ACETAMINOPHEN 325 MG/1
650 TABLET ORAL
Status: CANCELLED | OUTPATIENT
Start: 2022-12-20

## 2022-10-31 RX ORDER — DIPHENHYDRAMINE HYDROCHLORIDE 50 MG/ML
25 INJECTION INTRAMUSCULAR; INTRAVENOUS
Status: CANCELLED | OUTPATIENT
Start: 2022-12-20

## 2022-10-31 RX ORDER — SODIUM CHLORIDE 0.9 % (FLUSH) 0.9 %
10 SYRINGE (ML) INJECTION
Status: DISCONTINUED | OUTPATIENT
Start: 2022-10-31 | End: 2022-10-31 | Stop reason: HOSPADM

## 2022-10-31 RX ORDER — IPRATROPIUM BROMIDE AND ALBUTEROL SULFATE 2.5; .5 MG/3ML; MG/3ML
3 SOLUTION RESPIRATORY (INHALATION)
Status: CANCELLED | OUTPATIENT
Start: 2022-12-20

## 2022-10-31 RX ORDER — METHYLPREDNISOLONE SOD SUCC 125 MG
40 VIAL (EA) INJECTION
Status: CANCELLED | OUTPATIENT
Start: 2022-12-20

## 2022-10-31 RX ORDER — EPINEPHRINE 1 MG/ML
0.3 INJECTION, SOLUTION, CONCENTRATE INTRAVENOUS
Status: CANCELLED | OUTPATIENT
Start: 2022-12-20

## 2022-10-31 RX ADMIN — VEDOLIZUMAB 300 MG: 300 INJECTION, POWDER, LYOPHILIZED, FOR SOLUTION INTRAVENOUS at 09:10

## 2022-10-31 RX ADMIN — SODIUM CHLORIDE: 0.9 INJECTION, SOLUTION INTRAVENOUS at 08:10

## 2022-10-31 NOTE — PLAN OF CARE
Pt tolerated Entyvio well today. Reviewed follow-up appointments. All questions were answered, ambulated independently at d/c.

## 2022-11-14 ENCOUNTER — INFUSION (OUTPATIENT)
Dept: INFUSION THERAPY | Facility: HOSPITAL | Age: 82
End: 2022-11-14
Attending: INTERNAL MEDICINE
Payer: OTHER GOVERNMENT

## 2022-11-14 VITALS
SYSTOLIC BLOOD PRESSURE: 118 MMHG | HEIGHT: 68 IN | WEIGHT: 168.63 LBS | RESPIRATION RATE: 18 BRPM | BODY MASS INDEX: 25.56 KG/M2 | DIASTOLIC BLOOD PRESSURE: 54 MMHG | TEMPERATURE: 98 F | HEART RATE: 63 BPM

## 2022-11-14 DIAGNOSIS — K51.018 ULCERATIVE PANCOLITIS WITH OTHER COMPLICATION: Primary | ICD-10-CM

## 2022-11-14 LAB
ALBUMIN SERPL BCP-MCNC: 4.2 G/DL (ref 3.5–5.2)
ALP SERPL-CCNC: 55 U/L (ref 55–135)
ALT SERPL W/O P-5'-P-CCNC: 13 U/L (ref 10–44)
ANION GAP SERPL CALC-SCNC: 9 MMOL/L (ref 8–16)
AST SERPL-CCNC: 21 U/L (ref 10–40)
BASOPHILS # BLD AUTO: 0.05 K/UL (ref 0–0.2)
BASOPHILS NFR BLD: 0.5 % (ref 0–1.9)
BILIRUB SERPL-MCNC: 0.9 MG/DL (ref 0.1–1)
BUN SERPL-MCNC: 13 MG/DL (ref 8–23)
CALCIUM SERPL-MCNC: 9.6 MG/DL (ref 8.7–10.5)
CHLORIDE SERPL-SCNC: 102 MMOL/L (ref 95–110)
CO2 SERPL-SCNC: 27 MMOL/L (ref 23–29)
CREAT SERPL-MCNC: 1.1 MG/DL (ref 0.5–1.4)
DIFFERENTIAL METHOD: ABNORMAL
EOSINOPHIL # BLD AUTO: 0.2 K/UL (ref 0–0.5)
EOSINOPHIL NFR BLD: 2.3 % (ref 0–8)
ERYTHROCYTE [DISTWIDTH] IN BLOOD BY AUTOMATED COUNT: 12.7 % (ref 11.5–14.5)
EST. GFR  (NO RACE VARIABLE): >60 ML/MIN/1.73 M^2
GLUCOSE SERPL-MCNC: 100 MG/DL (ref 70–110)
HCT VFR BLD AUTO: 35.6 % (ref 40–54)
HGB BLD-MCNC: 12.4 G/DL (ref 14–18)
IMM GRANULOCYTES # BLD AUTO: 0.02 K/UL (ref 0–0.04)
IMM GRANULOCYTES NFR BLD AUTO: 0.2 % (ref 0–0.5)
LYMPHOCYTES # BLD AUTO: 4 K/UL (ref 1–4.8)
LYMPHOCYTES NFR BLD: 39.3 % (ref 18–48)
MCH RBC QN AUTO: 32 PG (ref 27–31)
MCHC RBC AUTO-ENTMCNC: 34.8 G/DL (ref 32–36)
MCV RBC AUTO: 92 FL (ref 82–98)
MONOCYTES # BLD AUTO: 0.7 K/UL (ref 0.3–1)
MONOCYTES NFR BLD: 7.1 % (ref 4–15)
NEUTROPHILS # BLD AUTO: 5.2 K/UL (ref 1.8–7.7)
NEUTROPHILS NFR BLD: 50.6 % (ref 38–73)
NRBC BLD-RTO: 0 /100 WBC
PLATELET # BLD AUTO: 301 K/UL (ref 150–450)
PMV BLD AUTO: 9.6 FL (ref 9.2–12.9)
POTASSIUM SERPL-SCNC: 4.2 MMOL/L (ref 3.5–5.1)
PROT SERPL-MCNC: 7.5 G/DL (ref 6–8.4)
RBC # BLD AUTO: 3.87 M/UL (ref 4.6–6.2)
SODIUM SERPL-SCNC: 138 MMOL/L (ref 136–145)
WBC # BLD AUTO: 10.18 K/UL (ref 3.9–12.7)

## 2022-11-14 PROCEDURE — 96365 THER/PROPH/DIAG IV INF INIT: CPT | Mod: PN

## 2022-11-14 PROCEDURE — 80053 COMPREHEN METABOLIC PANEL: CPT | Mod: PN | Performed by: INTERNAL MEDICINE

## 2022-11-14 PROCEDURE — 25000003 PHARM REV CODE 250: Mod: PN | Performed by: INTERNAL MEDICINE

## 2022-11-14 PROCEDURE — 85025 COMPLETE CBC W/AUTO DIFF WBC: CPT | Mod: PN | Performed by: INTERNAL MEDICINE

## 2022-11-14 PROCEDURE — 63600175 PHARM REV CODE 636 W HCPCS: Mod: JG,PN | Performed by: INTERNAL MEDICINE

## 2022-11-14 RX ORDER — HEPARIN 100 UNIT/ML
500 SYRINGE INTRAVENOUS
Status: CANCELLED | OUTPATIENT
Start: 2022-12-20

## 2022-11-14 RX ORDER — DIPHENHYDRAMINE HYDROCHLORIDE 50 MG/ML
25 INJECTION INTRAMUSCULAR; INTRAVENOUS
Status: CANCELLED | OUTPATIENT
Start: 2022-12-20

## 2022-11-14 RX ORDER — METHYLPREDNISOLONE SOD SUCC 125 MG
40 VIAL (EA) INJECTION
Status: CANCELLED | OUTPATIENT
Start: 2022-12-20

## 2022-11-14 RX ORDER — HEPARIN 100 UNIT/ML
500 SYRINGE INTRAVENOUS
Status: DISCONTINUED | OUTPATIENT
Start: 2022-11-14 | End: 2022-11-14 | Stop reason: HOSPADM

## 2022-11-14 RX ORDER — SODIUM CHLORIDE 0.9 % (FLUSH) 0.9 %
10 SYRINGE (ML) INJECTION
Status: DISCONTINUED | OUTPATIENT
Start: 2022-11-14 | End: 2022-11-14 | Stop reason: HOSPADM

## 2022-11-14 RX ORDER — EPINEPHRINE 1 MG/ML
0.3 INJECTION, SOLUTION, CONCENTRATE INTRAVENOUS
Status: CANCELLED | OUTPATIENT
Start: 2022-12-20

## 2022-11-14 RX ORDER — IPRATROPIUM BROMIDE AND ALBUTEROL SULFATE 2.5; .5 MG/3ML; MG/3ML
3 SOLUTION RESPIRATORY (INHALATION)
Status: CANCELLED | OUTPATIENT
Start: 2022-12-20

## 2022-11-14 RX ORDER — ACETAMINOPHEN 325 MG/1
650 TABLET ORAL
Status: CANCELLED | OUTPATIENT
Start: 2022-12-20

## 2022-11-14 RX ORDER — SODIUM CHLORIDE 0.9 % (FLUSH) 0.9 %
10 SYRINGE (ML) INJECTION
Status: CANCELLED | OUTPATIENT
Start: 2022-12-20

## 2022-11-14 RX ADMIN — VEDOLIZUMAB 300 MG: 300 INJECTION, POWDER, LYOPHILIZED, FOR SOLUTION INTRAVENOUS at 09:11

## 2022-11-14 RX ADMIN — SODIUM CHLORIDE: 0.9 INJECTION, SOLUTION INTRAVENOUS at 08:11

## 2022-11-14 NOTE — PLAN OF CARE
Tolerated Entyvio well.  No reactions noted.  No questions or concerns at this time.  Ambulated off unit in NAD.

## 2022-12-11 ENCOUNTER — PATIENT MESSAGE (OUTPATIENT)
Dept: GASTROENTEROLOGY | Facility: CLINIC | Age: 82
End: 2022-12-11
Payer: OTHER GOVERNMENT

## 2022-12-12 ENCOUNTER — INFUSION (OUTPATIENT)
Dept: INFUSION THERAPY | Facility: HOSPITAL | Age: 82
End: 2022-12-12
Attending: INTERNAL MEDICINE
Payer: OTHER GOVERNMENT

## 2022-12-12 VITALS
HEIGHT: 68 IN | RESPIRATION RATE: 18 BRPM | WEIGHT: 165.69 LBS | BODY MASS INDEX: 25.11 KG/M2 | HEART RATE: 62 BPM | TEMPERATURE: 98 F | SYSTOLIC BLOOD PRESSURE: 114 MMHG | DIASTOLIC BLOOD PRESSURE: 58 MMHG

## 2022-12-12 DIAGNOSIS — K51.018 ULCERATIVE PANCOLITIS WITH OTHER COMPLICATION: Primary | ICD-10-CM

## 2022-12-12 LAB
ALBUMIN SERPL BCP-MCNC: 3.5 G/DL (ref 3.5–5.2)
ALP SERPL-CCNC: 50 U/L (ref 55–135)
ALT SERPL W/O P-5'-P-CCNC: 10 U/L (ref 10–44)
ANION GAP SERPL CALC-SCNC: 9 MMOL/L (ref 8–16)
AST SERPL-CCNC: 15 U/L (ref 10–40)
BASOPHILS # BLD AUTO: 0.03 K/UL (ref 0–0.2)
BASOPHILS NFR BLD: 0.3 % (ref 0–1.9)
BILIRUB SERPL-MCNC: 0.6 MG/DL (ref 0.1–1)
BUN SERPL-MCNC: 16 MG/DL (ref 8–23)
CALCIUM SERPL-MCNC: 9 MG/DL (ref 8.7–10.5)
CHLORIDE SERPL-SCNC: 108 MMOL/L (ref 95–110)
CO2 SERPL-SCNC: 24 MMOL/L (ref 23–29)
CREAT SERPL-MCNC: 0.9 MG/DL (ref 0.5–1.4)
DIFFERENTIAL METHOD: ABNORMAL
EOSINOPHIL # BLD AUTO: 0.3 K/UL (ref 0–0.5)
EOSINOPHIL NFR BLD: 3.6 % (ref 0–8)
ERYTHROCYTE [DISTWIDTH] IN BLOOD BY AUTOMATED COUNT: 12.8 % (ref 11.5–14.5)
EST. GFR  (NO RACE VARIABLE): >60 ML/MIN/1.73 M^2
GLUCOSE SERPL-MCNC: 82 MG/DL (ref 70–110)
HCT VFR BLD AUTO: 33.5 % (ref 40–54)
HGB BLD-MCNC: 11.2 G/DL (ref 14–18)
IMM GRANULOCYTES # BLD AUTO: 0.02 K/UL (ref 0–0.04)
IMM GRANULOCYTES NFR BLD AUTO: 0.2 % (ref 0–0.5)
LYMPHOCYTES # BLD AUTO: 3.1 K/UL (ref 1–4.8)
LYMPHOCYTES NFR BLD: 32.3 % (ref 18–48)
MCH RBC QN AUTO: 31 PG (ref 27–31)
MCHC RBC AUTO-ENTMCNC: 33.4 G/DL (ref 32–36)
MCV RBC AUTO: 93 FL (ref 82–98)
MONOCYTES # BLD AUTO: 0.8 K/UL (ref 0.3–1)
MONOCYTES NFR BLD: 8.9 % (ref 4–15)
NEUTROPHILS # BLD AUTO: 5.2 K/UL (ref 1.8–7.7)
NEUTROPHILS NFR BLD: 54.7 % (ref 38–73)
NRBC BLD-RTO: 0 /100 WBC
PLATELET # BLD AUTO: 326 K/UL (ref 150–450)
PMV BLD AUTO: 10.3 FL (ref 9.2–12.9)
POTASSIUM SERPL-SCNC: 3.8 MMOL/L (ref 3.5–5.1)
PROT SERPL-MCNC: 6.4 G/DL (ref 6–8.4)
RBC # BLD AUTO: 3.61 M/UL (ref 4.6–6.2)
SODIUM SERPL-SCNC: 141 MMOL/L (ref 136–145)
WBC # BLD AUTO: 9.46 K/UL (ref 3.9–12.7)

## 2022-12-12 PROCEDURE — 63600175 PHARM REV CODE 636 W HCPCS: Mod: JG,PN | Performed by: INTERNAL MEDICINE

## 2022-12-12 PROCEDURE — 96365 THER/PROPH/DIAG IV INF INIT: CPT | Mod: PN

## 2022-12-12 PROCEDURE — 85025 COMPLETE CBC W/AUTO DIFF WBC: CPT | Mod: PN | Performed by: INTERNAL MEDICINE

## 2022-12-12 PROCEDURE — 80053 COMPREHEN METABOLIC PANEL: CPT | Mod: PN | Performed by: INTERNAL MEDICINE

## 2022-12-12 PROCEDURE — 25000003 PHARM REV CODE 250: Mod: PN | Performed by: INTERNAL MEDICINE

## 2022-12-12 RX ORDER — IPRATROPIUM BROMIDE AND ALBUTEROL SULFATE 2.5; .5 MG/3ML; MG/3ML
3 SOLUTION RESPIRATORY (INHALATION)
Status: CANCELLED | OUTPATIENT
Start: 2022-12-20

## 2022-12-12 RX ORDER — SODIUM CHLORIDE 0.9 % (FLUSH) 0.9 %
10 SYRINGE (ML) INJECTION
Status: DISCONTINUED | OUTPATIENT
Start: 2022-12-12 | End: 2022-12-12 | Stop reason: HOSPADM

## 2022-12-12 RX ORDER — SODIUM CHLORIDE 0.9 % (FLUSH) 0.9 %
10 SYRINGE (ML) INJECTION
Status: CANCELLED | OUTPATIENT
Start: 2022-12-20

## 2022-12-12 RX ORDER — HEPARIN 100 UNIT/ML
500 SYRINGE INTRAVENOUS
Status: CANCELLED | OUTPATIENT
Start: 2022-12-20

## 2022-12-12 RX ORDER — DIPHENHYDRAMINE HYDROCHLORIDE 50 MG/ML
25 INJECTION INTRAMUSCULAR; INTRAVENOUS
Status: CANCELLED | OUTPATIENT
Start: 2022-12-20

## 2022-12-12 RX ORDER — ACETAMINOPHEN 325 MG/1
650 TABLET ORAL
Status: CANCELLED | OUTPATIENT
Start: 2022-12-20

## 2022-12-12 RX ORDER — EPINEPHRINE 1 MG/ML
0.3 INJECTION, SOLUTION, CONCENTRATE INTRAVENOUS
Status: CANCELLED | OUTPATIENT
Start: 2022-12-20

## 2022-12-12 RX ORDER — METHYLPREDNISOLONE SOD SUCC 125 MG
40 VIAL (EA) INJECTION
Status: CANCELLED | OUTPATIENT
Start: 2022-12-20

## 2022-12-12 RX ADMIN — SODIUM CHLORIDE: 9 INJECTION, SOLUTION INTRAVENOUS at 08:12

## 2022-12-12 RX ADMIN — VEDOLIZUMAB 300 MG: 300 INJECTION, POWDER, LYOPHILIZED, FOR SOLUTION INTRAVENOUS at 09:12

## 2022-12-12 NOTE — PLAN OF CARE
Problem: Adult Inpatient Plan of Care  Goal: Patient-Specific Goal (Individualized)  Outcome: Ongoing, Progressing  Flowsheets (Taken 12/12/2022 1030)  Anxieties, Fears or Concerns: None  Individualized Care Needs: Recliner  Patient-Specific Goals (Include Timeframe): Infection prevention during treatment.     Problem: Fatigue  Goal: Improved Activity Tolerance  Intervention: Promote Improved Energy  Flowsheets (Taken 12/12/2022 1030)  Fatigue Management:   activity schedule adjusted   frequent rest breaks encouraged   paced activity encouraged   fatigue-related activity identified  Sleep/Rest Enhancement:   regular sleep/rest pattern promoted   relaxation techniques promoted  Activity Management:   Ambulated -L4   Ambulated in chandler - L4     Problem: Adult Inpatient Plan of Care  Goal: Plan of Care Review  Outcome: Ongoing, Progressing  Flowsheets (Taken 12/12/2022 1030)  Plan of Care Reviewed With: patient  Tolerated treatment with no known distress.  Ambulated form infusion center with steady gait.

## 2022-12-13 ENCOUNTER — PATIENT MESSAGE (OUTPATIENT)
Dept: GASTROENTEROLOGY | Facility: CLINIC | Age: 82
End: 2022-12-13
Payer: OTHER GOVERNMENT

## 2022-12-13 DIAGNOSIS — R19.7 DIARRHEA, UNSPECIFIED TYPE: Primary | ICD-10-CM

## 2022-12-13 RX ORDER — CHOLESTYRAMINE 4 G/9G
4 POWDER, FOR SUSPENSION ORAL DAILY
Qty: 30 PACKET | Refills: 2 | Status: SHIPPED | OUTPATIENT
Start: 2022-12-13 | End: 2023-01-23

## 2022-12-13 NOTE — TELEPHONE ENCOUNTER
Recommend stool evaluation (ordered); Stop Colestid medication; Start Questran (script sent); Give infusion more time to be effective.

## 2022-12-14 ENCOUNTER — PATIENT MESSAGE (OUTPATIENT)
Dept: GASTROENTEROLOGY | Facility: CLINIC | Age: 82
End: 2022-12-14
Payer: OTHER GOVERNMENT

## 2022-12-15 ENCOUNTER — TELEPHONE (OUTPATIENT)
Dept: GASTROENTEROLOGY | Facility: CLINIC | Age: 82
End: 2022-12-15
Payer: OTHER GOVERNMENT

## 2022-12-15 NOTE — TELEPHONE ENCOUNTER
----- Message from Henrietta Waldrop sent at 12/15/2022  9:41 AM CST -----  Regarding: STOOL CULTURE ADVICE  Contact: Patient  Type: Patient Call Back         Who called: Patient         What is the request in detail: calling to see if he can pick of stool kit from [facility] in [place] location; states it's closer to home; please advise           Best call back number: [phone]         Additional Information: has orders for stool culture           Thank You

## 2022-12-19 ENCOUNTER — LAB VISIT (OUTPATIENT)
Dept: LAB | Facility: HOSPITAL | Age: 82
End: 2022-12-19
Attending: INTERNAL MEDICINE
Payer: OTHER GOVERNMENT

## 2022-12-19 DIAGNOSIS — R19.7 DIARRHEA, UNSPECIFIED TYPE: ICD-10-CM

## 2022-12-19 LAB
C DIFF GDH STL QL: NEGATIVE
C DIFF TOX A+B STL QL IA: NEGATIVE

## 2022-12-19 PROCEDURE — 87046 STOOL CULTR AEROBIC BACT EA: CPT | Mod: 59 | Performed by: INTERNAL MEDICINE

## 2022-12-19 PROCEDURE — 87045 FECES CULTURE AEROBIC BACT: CPT | Performed by: INTERNAL MEDICINE

## 2022-12-19 PROCEDURE — 87427 SHIGA-LIKE TOXIN AG IA: CPT | Mod: 59 | Performed by: INTERNAL MEDICINE

## 2022-12-19 PROCEDURE — 87329 GIARDIA AG IA: CPT | Performed by: INTERNAL MEDICINE

## 2022-12-19 PROCEDURE — 87324 CLOSTRIDIUM AG IA: CPT | Performed by: INTERNAL MEDICINE

## 2022-12-19 PROCEDURE — 87209 SMEAR COMPLEX STAIN: CPT | Performed by: INTERNAL MEDICINE

## 2022-12-20 LAB
CRYPTOSP AG STL QL IA: NEGATIVE
G LAMBLIA AG STL QL IA: NEGATIVE

## 2022-12-21 LAB
E COLI SXT1 STL QL IA: NEGATIVE
E COLI SXT2 STL QL IA: NEGATIVE

## 2022-12-23 LAB
BACTERIA STL CULT: NORMAL
O+P STL MICRO: NORMAL

## 2022-12-27 ENCOUNTER — PATIENT MESSAGE (OUTPATIENT)
Dept: GASTROENTEROLOGY | Facility: CLINIC | Age: 82
End: 2022-12-27
Payer: OTHER GOVERNMENT

## 2023-01-16 ENCOUNTER — NURSE TRIAGE (OUTPATIENT)
Dept: ADMINISTRATIVE | Facility: CLINIC | Age: 83
End: 2023-01-16
Payer: OTHER GOVERNMENT

## 2023-01-16 NOTE — TELEPHONE ENCOUNTER
Mr. Olson is calling today to report that he has a widespread red rash began several weeks ago on his lower legs and has progressively spread, pruritic and bothersome to the patient, he is concerned that it may be related to the Entyvio infusions he is receiving for his UC.  I advised he be seen within the next 24 hours, [facility] [place], I recommended he reach out to them for an appt, or he could go in to the UC or [facility] virtual visit.  [facility]  We discussed ways to help manage the symptoms at home, as well; he verbalized understanding.  Will message Triny Ryan, NP with GI to make her aware.      Reason for Disposition   Hives or itching    Additional Information   Negative: [1] Life-threatening reaction (anaphylaxis) in the past to the same drug AND [2] < 2 hours since exposure   Negative: Difficulty breathing or wheezing   Negative: [1] Hoarseness or cough AND [2] started soon after 1st dose of drug   Negative: [1] Swollen tongue AND [2] started soon after 1st dose of drug   Negative: [1] Purple or blood-colored rash (spots or dots) AND [2] fever   Negative: Sounds like a life-threatening emergency to the triager   Negative: Swollen tongue   Negative: [1] Widespread hives AND [2] onset < 2 hours of exposure to 1st dose of drug   Negative: Fever   Negative: Patient sounds very sick or weak to the triager   Negative: [1] Purple or blood-colored rash (spots or dots) AND [2] no fever AND [3] sounds well to triager   Negative: [1] Taking new prescription medication AND [2] rash within 4 hours of 1st dose   Negative: Large or small blisters on skin (i.e., fluid filled bubbles or sacs)   Negative: Bloody crusts on lips or sores in mouth   Negative: Face or lip swelling    Protocols used: Rash - Widespread On Drugs-A-AH

## 2023-01-18 RX ORDER — METHYLPREDNISOLONE 4 MG/1
TABLET ORAL
Qty: 21 EACH | Refills: 0 | Status: SHIPPED | OUTPATIENT
Start: 2023-01-18 | End: 2023-02-08

## 2023-01-23 ENCOUNTER — OFFICE VISIT (OUTPATIENT)
Dept: GASTROENTEROLOGY | Facility: CLINIC | Age: 83
End: 2023-01-23
Payer: OTHER GOVERNMENT

## 2023-01-23 ENCOUNTER — TELEPHONE (OUTPATIENT)
Dept: GASTROENTEROLOGY | Facility: CLINIC | Age: 83
End: 2023-01-23

## 2023-01-23 VITALS — BODY MASS INDEX: 25.29 KG/M2 | WEIGHT: 166.88 LBS | HEIGHT: 68 IN

## 2023-01-23 DIAGNOSIS — R21 RASH: ICD-10-CM

## 2023-01-23 DIAGNOSIS — K51.00 ULCERATIVE PANCOLITIS: Primary | ICD-10-CM

## 2023-01-23 DIAGNOSIS — Z87.19 HISTORY OF GASTROESOPHAGEAL REFLUX (GERD): ICD-10-CM

## 2023-01-23 DIAGNOSIS — K52.9 CHRONIC DIARRHEA: ICD-10-CM

## 2023-01-23 PROCEDURE — 99214 PR OFFICE/OUTPT VISIT, EST, LEVL IV, 30-39 MIN: ICD-10-PCS | Mod: S$PBB,,, | Performed by: NURSE PRACTITIONER

## 2023-01-23 PROCEDURE — 99999 PR PBB SHADOW E&M-EST. PATIENT-LVL IV: CPT | Mod: PBBFAC,,, | Performed by: NURSE PRACTITIONER

## 2023-01-23 PROCEDURE — 99999 PR PBB SHADOW E&M-EST. PATIENT-LVL IV: ICD-10-PCS | Mod: PBBFAC,,, | Performed by: NURSE PRACTITIONER

## 2023-01-23 PROCEDURE — 99214 OFFICE O/P EST MOD 30 MIN: CPT | Mod: S$PBB,,, | Performed by: NURSE PRACTITIONER

## 2023-01-23 PROCEDURE — 99214 OFFICE O/P EST MOD 30 MIN: CPT | Mod: PBBFAC,PO | Performed by: NURSE PRACTITIONER

## 2023-01-23 RX ORDER — KETOCONAZOLE 20 MG/G
CREAM TOPICAL
COMMUNITY
Start: 2022-10-06

## 2023-01-23 RX ORDER — CHOLESTYRAMINE 4 G/9G
4 POWDER, FOR SUSPENSION ORAL DAILY
Qty: 30 PACKET | Refills: 2 | Status: SHIPPED | OUTPATIENT
Start: 2023-01-23 | End: 2023-01-24 | Stop reason: SDUPTHER

## 2023-01-23 RX ORDER — CHOLECALCIFEROL (VITAMIN D3) 50 MCG
1000 CAPSULE ORAL
COMMUNITY
Start: 2022-11-25

## 2023-01-23 NOTE — TELEPHONE ENCOUNTER
Called and notified pt of recommendations and scheduled appt with Dr. Ivory. Pt verbalized understanding to all

## 2023-01-23 NOTE — TELEPHONE ENCOUNTER
Let patient know I spoke with Dr. Ervin and he recommends patient hold off on next Entyvio infusion and follow-up with dermatology for continued evaluation of rash.

## 2023-01-23 NOTE — PROGRESS NOTES
Subjective:       Patient ID: Alfie Olson is a 82 y.o. male, Body mass index is 25.38 kg/m².    Chief Complaint: Ulcerative Colitis      Patient is new to me. Established patient of Dr. Ivory, Dr. Ervin & Bella Haskins, NP.     Diarrhea   This is a chronic problem. The current episode started more than 1 year ago. The problem occurs 2 to 4 times per day (1-2 times per day since starting Medrol pack). The problem has been unchanged (improved since starting Medrol pack). The stool consistency is described as Watery (describes stool as type 6-7 on bristol scale; rarely has formed stool; denies bloody stools). The patient states that diarrhea does not awaken him from sleep. Associated symptoms include abdominal pain and increased flatus. Pertinent negatives include no bloating, chills, coughing, fever, vomiting or weight loss. The symptoms are aggravated by stress. There are no known risk factors (denies recent antibiotics, hospitalization or foreign travel; stool evaluation done 12/19/22 unremarkable). Treatments tried: Past meds: Sulfasalazine, Mesalamine and Colestipol- no improvement; Currently: Enyvio 300 mg IV- no improvement and possibly caused rash to body; did not get Rx for Questran. His past medical history is significant for inflammatory bowel disease. There is no history of bowel resection, irritable bowel syndrome or a recent abdominal surgery.   Review of Systems   Constitutional:  Negative for appetite change, chills, fever, unexpected weight change and weight loss.   HENT:  Negative for trouble swallowing.    Respiratory:  Negative for cough and shortness of breath.    Cardiovascular:  Negative for chest pain.   Gastrointestinal:  Positive for abdominal pain, diarrhea and flatus. Negative for abdominal distention, anal bleeding, bloating, blood in stool, constipation, nausea, rectal pain and vomiting.        Hx of GERD- well controlled taking Pepcid   Genitourinary:  Negative for  difficulty urinating and dysuria.   Musculoskeletal:  Negative for gait problem.   Skin:  Positive for rash (reports rash to bilateral lower extremities prior to starting Entyvio infusions, spread to abdomen over the past month; taking Medrol pack has helped).   Neurological:  Negative for speech difficulty.   Psychiatric/Behavioral:  Negative for confusion.      Past Medical History:   Diagnosis Date    Arthritis     B12 deficiency     Cholelithiasis     Chronic back pain     Colon polyp     Diverticulosis     GERD (gastroesophageal reflux disease)     Glaucoma     left eye    Hyperlipemia     JUAN DIEGO (obstructive sleep apnea)     denies CPAP use    Skin cancer     Ulcerative colitis 1961    Vitamin D deficiency       Past Surgical History:   Procedure Laterality Date    BACK SURGERY      CATARACT EXTRACTION W/ INTRAOCULAR LENS IMPLANT Right     COLONOSCOPY  2014    COLONOSCOPY  03/25/2015    diverticulosis, otherwise normal findings, biopsies taken from 4 quadrants- see media section for biopsy report, repeat in 1 year    COLONOSCOPY N/A 6/1/2016    Procedure: COLONOSCOPY;  Surgeon: Ravindra Ervin MD;  Location: [facility] ENDO;  Service: Endoscopy;  Laterality: N/A;    COLONOSCOPY N/A 9/1/2022    Procedure: COLONOSCOPY;  Surgeon: Lencho Ivory MD;  Location: [facility] ENDO;  Service: Endoscopy;  Laterality: N/A;    HERNIA REPAIR      rt inguinal    LUMBAR FUSION      L3,4,5    MULTIPLE TOOTH EXTRACTIONS      pain pump placement  04/08/2010    dilaudid/baclofen    SKIN BIOPSY      SKIN CANCER EXCISION      UPPER GASTROINTESTINAL ENDOSCOPY  10/26/2009    hiatal hernia      Family History   Problem Relation Age of Onset    Ulcerative colitis Son     Colon cancer Neg Hx     Colon polyps Neg Hx       Wt Readings from Last 10 Encounters:   01/23/23 75.7 kg (166 lb 14.2 oz)   12/12/22 75.1 kg (165 lb 10.8 oz)   11/14/22 76.5 kg (168 lb 10.4 oz)   10/31/22 76 kg (167 lb 8.8 oz)   10/20/22 74.4 kg (164 lb 0.4 oz)   08/30/22  79.8 kg (176 lb)   08/25/22 76.3 kg (168 lb 3.4 oz)   05/31/16 78 kg (172 lb)   05/19/16 78.3 kg (172 lb 9.9 oz)     Lab Results   Component Value Date    WBC 9.46 12/12/2022    HGB 11.2 (L) 12/12/2022    HCT 33.5 (L) 12/12/2022    MCV 93 12/12/2022     12/12/2022     CMP  Sodium   Date Value Ref Range Status   12/12/2022 141 136 - 145 mmol/L Final     Potassium   Date Value Ref Range Status   12/12/2022 3.8 3.5 - 5.1 mmol/L Final     Chloride   Date Value Ref Range Status   12/12/2022 108 95 - 110 mmol/L Final     CO2   Date Value Ref Range Status   12/12/2022 24 23 - 29 mmol/L Final     Glucose   Date Value Ref Range Status   12/12/2022 82 70 - 110 mg/dL Final     BUN   Date Value Ref Range Status   12/12/2022 16 8 - 23 mg/dL Final     Creatinine   Date Value Ref Range Status   12/12/2022 0.9 0.5 - 1.4 mg/dL Final     Calcium   Date Value Ref Range Status   12/12/2022 9.0 8.7 - 10.5 mg/dL Final     Total Protein   Date Value Ref Range Status   12/12/2022 6.4 6.0 - 8.4 g/dL Final     Albumin   Date Value Ref Range Status   12/12/2022 3.5 3.5 - 5.2 g/dL Final     Total Bilirubin   Date Value Ref Range Status   12/12/2022 0.6 0.1 - 1.0 mg/dL Final     Comment:     For infants and newborns, interpretation of results should be based  on gestational age, weight and in agreement with clinical  observations.    Premature Infant recommended reference ranges:  Up to 24 hours.............<8.0 mg/dL  Up to 48 hours............<12.0 mg/dL  3-5 days..................<15.0 mg/dL  6-29 days.................<15.0 mg/dL       Alkaline Phosphatase   Date Value Ref Range Status   12/12/2022 50 (L) 55 - 135 U/L Final     AST   Date Value Ref Range Status   12/12/2022 15 10 - 40 U/L Final     ALT   Date Value Ref Range Status   12/12/2022 10 10 - 44 U/L Final     Anion Gap   Date Value Ref Range Status   12/12/2022 9 8 - 16 mmol/L Final              Reviewed prior medical records including endoscopy history (see surgical  history).     Objective:      Physical Exam  Constitutional:       General: He is not in acute distress.     Appearance: He is well-developed.   HENT:      Head: Normocephalic.      Right Ear: Hearing normal.      Left Ear: Hearing normal.      Nose: Nose normal.      Mouth/Throat:      Mouth: No oral lesions.      Pharynx: Uvula midline.   Eyes:      General: Lids are normal.      Conjunctiva/sclera: Conjunctivae normal.      Pupils: Pupils are equal, round, and reactive to light.   Neck:      Trachea: Trachea normal.   Cardiovascular:      Rate and Rhythm: Normal rate and regular rhythm.      Heart sounds: Normal heart sounds. No murmur heard.  Pulmonary:      Effort: Pulmonary effort is normal. No respiratory distress.      Breath sounds: Normal breath sounds. No stridor. No wheezing.   Abdominal:      General: Bowel sounds are normal. There is no distension.      Palpations: Abdomen is soft. There is no mass.      Tenderness: There is abdominal tenderness in the right lower quadrant. There is no guarding or rebound.   Musculoskeletal:         General: Normal range of motion.      Cervical back: Normal range of motion.   Skin:     General: Skin is warm and dry.      Findings: No rash.      Comments: Non jaundiced; Rash to bilateral lower extremities and abdomen   Neurological:      Mental Status: He is alert and oriented to person, place, and time.   Psychiatric:         Speech: Speech normal.         Behavior: Behavior normal. Behavior is cooperative.         Assessment:       1. Ulcerative pancolitis    2. Chronic diarrhea    3. History of gastroesophageal reflux (GERD)    4. Rash           Plan:   Discussed case with Dr. Ervin.     All diagnoses and orders for this visit:    Ulcerative pancolitis  - Start: cholestyramine (QUESTRAN) 4 gram packet; Take 1 packet (4 g total) by mouth once daily.  Dispense: 30 packet; Refill: 2  - Discontinue Colestipol  - Hold Entyvio for now  - Recommended increase fiber in  diet, especially soluble fiber since this can help bulk up the stool consistency and may help to slow down how fast the stool goes through the colon and can prevent diarrhea   - Recommend follow-up with Dr. Ivory for continued evaluation and management     History of gastroesophageal reflux (GERD)   - Continue Famotidine 40 mg once daily  - Recommend to avoid large meals, avoid eating within 3 hours of bedtime, elevate head of bed if nocturnal symptoms are present, smoking cessation (if current smoker), & weight loss (if overweight).   - Recommend minimize/avoid high-fat foods, chocolate, caffeine, citrus, alcohol, & tomato products.  - Advised to avoid/limit use of NSAID's, since they can cause GI upset, bleeding, and/or ulcers. If needed, take with food.      Rash   - Instructed patient to follow-up with PCP/dermatology for continued evaluation and management ([facility], he will get dermatology referral at appt)    If no improvement in symptoms or symptoms worsen, call/follow-up at clinic or go to ER

## 2023-01-24 DIAGNOSIS — K51.00 ULCERATIVE PANCOLITIS: ICD-10-CM

## 2023-01-24 DIAGNOSIS — K52.9 CHRONIC DIARRHEA: ICD-10-CM

## 2023-01-24 RX ORDER — CHOLESTYRAMINE 4 G/9G
4 POWDER, FOR SUSPENSION ORAL DAILY
Qty: 30 PACKET | Refills: 2 | Status: SHIPPED | OUTPATIENT
Start: 2023-01-24 | End: 2023-10-12 | Stop reason: SDUPTHER

## 2023-02-06 ENCOUNTER — INFUSION (OUTPATIENT)
Dept: INFUSION THERAPY | Facility: HOSPITAL | Age: 83
End: 2023-02-06
Attending: INTERNAL MEDICINE
Payer: OTHER GOVERNMENT

## 2023-02-06 VITALS
DIASTOLIC BLOOD PRESSURE: 64 MMHG | BODY MASS INDEX: 25.36 KG/M2 | HEIGHT: 68 IN | WEIGHT: 167.31 LBS | TEMPERATURE: 98 F | HEART RATE: 71 BPM | SYSTOLIC BLOOD PRESSURE: 125 MMHG | RESPIRATION RATE: 16 BRPM

## 2023-02-06 DIAGNOSIS — K51.018 ULCERATIVE PANCOLITIS WITH OTHER COMPLICATION: Primary | ICD-10-CM

## 2023-02-06 LAB
ALBUMIN SERPL BCP-MCNC: 3.6 G/DL (ref 3.5–5.2)
ALP SERPL-CCNC: 63 U/L (ref 55–135)
ALT SERPL W/O P-5'-P-CCNC: 18 U/L (ref 10–44)
ANION GAP SERPL CALC-SCNC: 10 MMOL/L (ref 8–16)
AST SERPL-CCNC: 20 U/L (ref 10–40)
BASOPHILS # BLD AUTO: 0.03 K/UL (ref 0–0.2)
BASOPHILS NFR BLD: 0.3 % (ref 0–1.9)
BILIRUB SERPL-MCNC: 0.8 MG/DL (ref 0.1–1)
BUN SERPL-MCNC: 14 MG/DL (ref 8–23)
CALCIUM SERPL-MCNC: 8.9 MG/DL (ref 8.7–10.5)
CHLORIDE SERPL-SCNC: 105 MMOL/L (ref 95–110)
CO2 SERPL-SCNC: 26 MMOL/L (ref 23–29)
CREAT SERPL-MCNC: 1 MG/DL (ref 0.5–1.4)
DIFFERENTIAL METHOD: ABNORMAL
EOSINOPHIL # BLD AUTO: 0.3 K/UL (ref 0–0.5)
EOSINOPHIL NFR BLD: 2.7 % (ref 0–8)
ERYTHROCYTE [DISTWIDTH] IN BLOOD BY AUTOMATED COUNT: 13.6 % (ref 11.5–14.5)
EST. GFR  (NO RACE VARIABLE): >60 ML/MIN/1.73 M^2
GLUCOSE SERPL-MCNC: 121 MG/DL (ref 70–110)
HCT VFR BLD AUTO: 33.5 % (ref 40–54)
HGB BLD-MCNC: 11.4 G/DL (ref 14–18)
IMM GRANULOCYTES # BLD AUTO: 0.03 K/UL (ref 0–0.04)
IMM GRANULOCYTES NFR BLD AUTO: 0.3 % (ref 0–0.5)
LYMPHOCYTES # BLD AUTO: 3.5 K/UL (ref 1–4.8)
LYMPHOCYTES NFR BLD: 34.3 % (ref 18–48)
MCH RBC QN AUTO: 31.1 PG (ref 27–31)
MCHC RBC AUTO-ENTMCNC: 34 G/DL (ref 32–36)
MCV RBC AUTO: 91 FL (ref 82–98)
MONOCYTES # BLD AUTO: 0.8 K/UL (ref 0.3–1)
MONOCYTES NFR BLD: 8.2 % (ref 4–15)
NEUTROPHILS # BLD AUTO: 5.5 K/UL (ref 1.8–7.7)
NEUTROPHILS NFR BLD: 54.2 % (ref 38–73)
NRBC BLD-RTO: 0 /100 WBC
PLATELET # BLD AUTO: 332 K/UL (ref 150–450)
PMV BLD AUTO: 9.8 FL (ref 9.2–12.9)
POTASSIUM SERPL-SCNC: 4.1 MMOL/L (ref 3.5–5.1)
PROT SERPL-MCNC: 6.9 G/DL (ref 6–8.4)
RBC # BLD AUTO: 3.67 M/UL (ref 4.6–6.2)
SODIUM SERPL-SCNC: 141 MMOL/L (ref 136–145)
WBC # BLD AUTO: 10.22 K/UL (ref 3.9–12.7)

## 2023-02-06 PROCEDURE — 63600175 PHARM REV CODE 636 W HCPCS: Mod: JG,PN | Performed by: INTERNAL MEDICINE

## 2023-02-06 PROCEDURE — 96365 THER/PROPH/DIAG IV INF INIT: CPT | Mod: PN

## 2023-02-06 PROCEDURE — 25000003 PHARM REV CODE 250: Mod: PN | Performed by: INTERNAL MEDICINE

## 2023-02-06 PROCEDURE — 85025 COMPLETE CBC W/AUTO DIFF WBC: CPT | Mod: PN | Performed by: INTERNAL MEDICINE

## 2023-02-06 PROCEDURE — 80053 COMPREHEN METABOLIC PANEL: CPT | Mod: PN | Performed by: INTERNAL MEDICINE

## 2023-02-06 RX ORDER — ACETAMINOPHEN 325 MG/1
650 TABLET ORAL
Status: CANCELLED | OUTPATIENT
Start: 2023-02-13

## 2023-02-06 RX ORDER — DIPHENHYDRAMINE HYDROCHLORIDE 50 MG/ML
25 INJECTION INTRAMUSCULAR; INTRAVENOUS
Status: CANCELLED | OUTPATIENT
Start: 2023-02-13

## 2023-02-06 RX ORDER — SODIUM CHLORIDE 0.9 % (FLUSH) 0.9 %
10 SYRINGE (ML) INJECTION
Status: CANCELLED | OUTPATIENT
Start: 2023-02-13

## 2023-02-06 RX ORDER — EPINEPHRINE 1 MG/ML
0.3 INJECTION, SOLUTION, CONCENTRATE INTRAVENOUS
Status: CANCELLED | OUTPATIENT
Start: 2023-02-13

## 2023-02-06 RX ORDER — SODIUM CHLORIDE 0.9 % (FLUSH) 0.9 %
10 SYRINGE (ML) INJECTION
Status: DISCONTINUED | OUTPATIENT
Start: 2023-02-06 | End: 2023-02-06 | Stop reason: HOSPADM

## 2023-02-06 RX ORDER — HEPARIN 100 UNIT/ML
500 SYRINGE INTRAVENOUS
Status: CANCELLED | OUTPATIENT
Start: 2023-02-13

## 2023-02-06 RX ORDER — IPRATROPIUM BROMIDE AND ALBUTEROL SULFATE 2.5; .5 MG/3ML; MG/3ML
3 SOLUTION RESPIRATORY (INHALATION)
Status: CANCELLED | OUTPATIENT
Start: 2023-02-13

## 2023-02-06 RX ORDER — METHYLPREDNISOLONE SOD SUCC 125 MG
40 VIAL (EA) INJECTION
Status: CANCELLED | OUTPATIENT
Start: 2023-02-13

## 2023-02-06 RX ADMIN — VEDOLIZUMAB 300 MG: 300 INJECTION, POWDER, LYOPHILIZED, FOR SOLUTION INTRAVENOUS at 09:02

## 2023-02-06 RX ADMIN — SODIUM CHLORIDE: 9 INJECTION, SOLUTION INTRAVENOUS at 09:02

## 2023-02-06 NOTE — PLAN OF CARE
Problem: Adult Inpatient Plan of Care  Goal: Plan of Care Review  Outcome: Ongoing, Progressing  Flowsheets (Taken 2/6/2023 1137)  Plan of Care Reviewed With: patient  Goal: Patient-Specific Goal (Individualized)  Outcome: Ongoing, Progressing  Flowsheets (Taken 2/6/2023 1137)  Individualized Care Needs: warm blanket  Goal: Absence of Hospital-Acquired Illness or Injury  Outcome: Ongoing, Progressing  Goal: Optimal Comfort and Wellbeing  Outcome: Ongoing, Progressing  Goal: Readiness for Transition of Care  Outcome: Ongoing, Progressing     Problem: Fatigue  Goal: Improved Activity Tolerance  Outcome: Ongoing, Progressing  Intervention: Promote Improved Energy  Flowsheets (Taken 2/6/2023 1137)  Fatigue Management: activity schedule adjusted  Sleep/Rest Enhancement: regular sleep/rest pattern promoted  Activity Management: Up in chair - L3     Problem: Fall Injury Risk  Goal: Absence of Fall and Fall-Related Injury  Outcome: Ongoing, Progressing  Intervention: Identify and Manage Contributors  Flowsheets (Taken 2/6/2023 1600)  Medication Review/Management: medications reviewed

## 2023-03-23 ENCOUNTER — OFFICE VISIT (OUTPATIENT)
Dept: GASTROENTEROLOGY | Facility: CLINIC | Age: 83
End: 2023-03-23
Payer: OTHER GOVERNMENT

## 2023-03-23 VITALS — HEIGHT: 68 IN | BODY MASS INDEX: 25.76 KG/M2 | WEIGHT: 170 LBS

## 2023-03-23 DIAGNOSIS — K51.00 ULCERATIVE PANCOLITIS: ICD-10-CM

## 2023-03-23 DIAGNOSIS — K51.018 ULCERATIVE PANCOLITIS WITH OTHER COMPLICATION: Primary | ICD-10-CM

## 2023-03-23 PROCEDURE — 99213 OFFICE O/P EST LOW 20 MIN: CPT | Mod: PBBFAC,PO | Performed by: INTERNAL MEDICINE

## 2023-03-23 PROCEDURE — 99215 PR OFFICE/OUTPT VISIT, EST, LEVL V, 40-54 MIN: ICD-10-PCS | Mod: S$PBB,,, | Performed by: INTERNAL MEDICINE

## 2023-03-23 PROCEDURE — 99999 PR PBB SHADOW E&M-EST. PATIENT-LVL III: CPT | Mod: PBBFAC,,, | Performed by: INTERNAL MEDICINE

## 2023-03-23 PROCEDURE — 99999 PR PBB SHADOW E&M-EST. PATIENT-LVL III: ICD-10-PCS | Mod: PBBFAC,,, | Performed by: INTERNAL MEDICINE

## 2023-03-23 PROCEDURE — 99215 OFFICE O/P EST HI 40 MIN: CPT | Mod: S$PBB,,, | Performed by: INTERNAL MEDICINE

## 2023-03-23 NOTE — PROGRESS NOTES
CC: colitis    HPI 82 y.o. male with history of GERD, hyperlipidemia, JUAN DIEGO, diverticulosis and colon polyps who presents for follow up of original diagnosis of ulcerative colitis. Colonoscopy 9/1/22 with findings of normal TI, mild inflammation throughout the entire colon. Path with mild chronic active colitis throughout the colon with rare microscopic granulomas and rare non-necrotizing micro granulomas which suspected to be related to potential Crohn's disease of colon.    He is currently on Entyvio 300 mg every 8 weeks monotherapy.     He reports diarrhea has significantly improved. On his worst day he has loose stool about 3 times per day. Nighttime symptoms have discontinued. He is compliant with infusions. No other complaints. Seen in clinic several months ago and thought to have rash related to Entyvio but saw dermatology and it was unrelated. He was placed on medrol dose pack at the time. No issues with most recent infusion.    No other complaints. No fevers, chills. Gaining weight since last seen.     Past Medical History:   Diagnosis Date    Arthritis     B12 deficiency     Cholelithiasis     Chronic back pain     Colon polyp     Diverticulosis     GERD (gastroesophageal reflux disease)     Glaucoma     left eye    Hyperlipemia     JUAN DIEGO (obstructive sleep apnea)     denies CPAP use    Skin cancer     Ulcerative colitis 1961    Vitamin D deficiency      IBD History:   IBD disease: Ulcerative colitis (suspect to be really Crohn's disease with granulomas on biopsies of colon)  Disease location: colonic  Disease behavior: Inflammatory  Current therapy:  Entyvio 300 mg every 8 weeks monotherapy  Prior therapy: Sulfasalazine, mesalamine, steroids  Surgeries: None  Complications:None  Extraintestinal manifestations: None    Review of Systems  General ROS: negative for chills, fever or weight loss  Cardiovascular ROS: no chest pain or dyspnea on exertion  Gastrointestinal ROS: no abdominal pain, change in bowel  "habits, or black/ bloody stools    Physical Examination  Ht 5' 8" (1.727 m)   Wt 77.1 kg (169 lb 15.6 oz)   BMI 25.84 kg/m²   General appearance: alert, cooperative, no distress  HENT: Normocephalic, atraumatic, neck symmetrical  Eyes: conjunctivae clear  Lungs: No labored breathing  Skin: No jaundice  Neurologic: Alert and oriented X 3    Most recent Labs/Stool studies:    CMP  Sodium   Date Value Ref Range Status   02/06/2023 141 136 - 145 mmol/L Final     Potassium   Date Value Ref Range Status   02/06/2023 4.1 3.5 - 5.1 mmol/L Final     Chloride   Date Value Ref Range Status   02/06/2023 105 95 - 110 mmol/L Final     CO2   Date Value Ref Range Status   02/06/2023 26 23 - 29 mmol/L Final     Glucose   Date Value Ref Range Status   02/06/2023 121 (H) 70 - 110 mg/dL Final     BUN   Date Value Ref Range Status   02/06/2023 14 8 - 23 mg/dL Final     Creatinine   Date Value Ref Range Status   02/06/2023 1.0 0.5 - 1.4 mg/dL Final     Calcium   Date Value Ref Range Status   02/06/2023 8.9 8.7 - 10.5 mg/dL Final     Total Protein   Date Value Ref Range Status   02/06/2023 6.9 6.0 - 8.4 g/dL Final     Albumin   Date Value Ref Range Status   02/06/2023 3.6 3.5 - 5.2 g/dL Final     Total Bilirubin   Date Value Ref Range Status   02/06/2023 0.8 0.1 - 1.0 mg/dL Final     Comment:     For infants and newborns, interpretation of results should be based  on gestational age, weight and in agreement with clinical  observations.    Premature Infant recommended reference ranges:  Up to 24 hours.............<8.0 mg/dL  Up to 48 hours............<12.0 mg/dL  3-5 days..................<15.0 mg/dL  6-29 days.................<15.0 mg/dL       Alkaline Phosphatase   Date Value Ref Range Status   02/06/2023 63 55 - 135 U/L Final     AST   Date Value Ref Range Status   02/06/2023 20 10 - 40 U/L Final     ALT   Date Value Ref Range Status   02/06/2023 18 10 - 44 U/L Final     Anion Gap   Date Value Ref Range Status   02/06/2023 10 8 - 16 " mmol/L Final     eGFR   Date Value Ref Range Status   02/06/2023 >60.0 >60 mL/min/1.73 m^2 Final     Lab Results   Component Value Date    WBC 10.22 02/06/2023    HGB 11.4 (L) 02/06/2023    HCT 33.5 (L) 02/06/2023    MCV 91 02/06/2023     02/06/2023     Most recent Endoscopy:     Colonoscopy:9/1/22:  Findings:   The perianal and digital rectal examinations were normal.   The terminal ileum appeared normal.   A diffuse area of mildly erythematous mucosa was found in the entire colon. Biopsies were taken with a cold forceps for histology. Verification of patient identification for the specimen was done. Estimated blood loss was minimal.   Non-bleeding internal hemorrhoids were found during   The hemorrhoids were small.     Assessment and Plan: 82 y.o. male     History of Ulcerative Colitis   Granulomas on biopsies of colon (suspected to be related to Crohn's disease of colon)  On Entyvio 300 mg every 8 weeks monotherapy    Plan:   -Suspect he truly has colonic Crohn's disease given granulomas on biopsies of colon   -He is doing well therefore will continue Entyvio infusion 300 mg IV every 8 weeks  -Hepatitis serology and TB testing to be updated yearly at infusion  -Check fecal calprotectin and CRP  -[facility]    40 minutes of total time spent on the encounter, which includes face to face time and non-face to face time preparing to see the patient (eg, review of tests), obtaining and/or reviewing separately obtained history, documenting clinical information in the electronic or other health record, Independently interpreting results (not separately reported) and communicating results to the patient/family/caregiver, or care coordination (not separately reported).        ----------------------------------------------------------------------------------------------------------------------  Health Maintenance:   -COVID: KATIA  -Prevnar 13: UTD  -Pneumovax 23: UTD  -Flu Shot:  Recommend annually  -Shingrix: Recommend    -Hepatitis A/Hepatitis B: Not immune, could consider vaccination  -Annual skin exam: Recommend   -Colon cancer screening: Risks factors: >1/3 of colon involved with colitis and >8-10 years of IBD duration, Distribution of colonic disease: pan-colonic  , Year of symptom onset: >60 years ; Colonoscopy: every 1-2 years for surveillance once in endoscopic remission  -DEXA scan: Recommend  -Tobacco: Avoid  -Should avoid NSAIDs and narcotics, use only Tylenol for pain relief.     Follow up: RTC 6 months    Lencho Ivory MD  [facility]  [address] [facility] [address]  [place] [address]  Office: [phone]  Fax: [phone]

## 2023-03-24 ENCOUNTER — TELEPHONE (OUTPATIENT)
Dept: GASTROENTEROLOGY | Facility: CLINIC | Age: 83
End: 2023-03-24
Payer: OTHER GOVERNMENT

## 2023-03-24 DIAGNOSIS — K51.00 ULCERATIVE PANCOLITIS: Primary | ICD-10-CM

## 2023-03-25 DIAGNOSIS — K51.018 ULCERATIVE PANCOLITIS WITH OTHER COMPLICATION: Primary | ICD-10-CM

## 2023-04-03 ENCOUNTER — LAB VISIT (OUTPATIENT)
Dept: LAB | Facility: HOSPITAL | Age: 83
End: 2023-04-03
Attending: INTERNAL MEDICINE
Payer: OTHER GOVERNMENT

## 2023-04-03 ENCOUNTER — INFUSION (OUTPATIENT)
Dept: INFUSION THERAPY | Facility: HOSPITAL | Age: 83
End: 2023-04-03
Attending: INTERNAL MEDICINE
Payer: OTHER GOVERNMENT

## 2023-04-03 VITALS
RESPIRATION RATE: 16 BRPM | SYSTOLIC BLOOD PRESSURE: 103 MMHG | DIASTOLIC BLOOD PRESSURE: 61 MMHG | BODY MASS INDEX: 25.75 KG/M2 | WEIGHT: 169.88 LBS | TEMPERATURE: 98 F | HEART RATE: 61 BPM | HEIGHT: 68 IN

## 2023-04-03 DIAGNOSIS — K51.00 ULCERATIVE PANCOLITIS: ICD-10-CM

## 2023-04-03 DIAGNOSIS — K51.018 ULCERATIVE PANCOLITIS WITH OTHER COMPLICATION: Primary | ICD-10-CM

## 2023-04-03 PROCEDURE — 63600175 PHARM REV CODE 636 W HCPCS: Mod: JZ,JG,PN | Performed by: INTERNAL MEDICINE

## 2023-04-03 PROCEDURE — 25000003 PHARM REV CODE 250: Mod: PN | Performed by: INTERNAL MEDICINE

## 2023-04-03 PROCEDURE — 96413 CHEMO IV INFUSION 1 HR: CPT | Mod: PN

## 2023-04-03 PROCEDURE — 83993 ASSAY FOR CALPROTECTIN FECAL: CPT | Performed by: INTERNAL MEDICINE

## 2023-04-03 RX ORDER — SODIUM CHLORIDE 0.9 % (FLUSH) 0.9 %
10 SYRINGE (ML) INJECTION
Status: CANCELLED | OUTPATIENT
Start: 2023-05-29

## 2023-04-03 RX ORDER — METHYLPREDNISOLONE SOD SUCC 125 MG
40 VIAL (EA) INJECTION
Status: CANCELLED | OUTPATIENT
Start: 2023-05-29

## 2023-04-03 RX ORDER — IPRATROPIUM BROMIDE AND ALBUTEROL SULFATE 2.5; .5 MG/3ML; MG/3ML
3 SOLUTION RESPIRATORY (INHALATION)
Status: CANCELLED | OUTPATIENT
Start: 2023-05-29

## 2023-04-03 RX ORDER — DIPHENHYDRAMINE HYDROCHLORIDE 50 MG/ML
25 INJECTION INTRAMUSCULAR; INTRAVENOUS
Status: CANCELLED | OUTPATIENT
Start: 2023-05-29

## 2023-04-03 RX ORDER — ACETAMINOPHEN 325 MG/1
650 TABLET ORAL
Status: CANCELLED | OUTPATIENT
Start: 2023-05-29

## 2023-04-03 RX ORDER — SODIUM CHLORIDE 0.9 % (FLUSH) 0.9 %
10 SYRINGE (ML) INJECTION
Status: DISCONTINUED | OUTPATIENT
Start: 2023-04-03 | End: 2023-04-03 | Stop reason: HOSPADM

## 2023-04-03 RX ORDER — HEPARIN 100 UNIT/ML
500 SYRINGE INTRAVENOUS
Status: CANCELLED | OUTPATIENT
Start: 2023-05-29

## 2023-04-03 RX ORDER — EPINEPHRINE 1 MG/ML
0.3 INJECTION, SOLUTION, CONCENTRATE INTRAVENOUS
Status: CANCELLED | OUTPATIENT
Start: 2023-05-29

## 2023-04-03 RX ADMIN — VEDOLIZUMAB 300 MG: 300 INJECTION, POWDER, LYOPHILIZED, FOR SOLUTION INTRAVENOUS at 09:04

## 2023-04-03 RX ADMIN — SODIUM CHLORIDE: 9 INJECTION, SOLUTION INTRAVENOUS at 08:04

## 2023-04-03 NOTE — PLAN OF CARE
Problem: Adult Inpatient Plan of Care  Goal: Patient-Specific Goal (Individualized)  4/3/2023 1000 by Carlos Madsen RN  Outcome: Ongoing, Progressing  Flowsheets (Taken 4/3/2023 1000)  Anxieties, Fears or Concerns: None  Individualized Care Needs: Recliner, crystalet  4/3/2023 0959 by Carlos Madsen RN  Outcome: Ongoing, Progressing     Problem: Fatigue  Goal: Improved Activity Tolerance  Intervention: Promote Improved Energy  Flowsheets (Taken 4/3/2023 1000)  Fatigue Management:   activity schedule adjusted   frequent rest breaks encouraged   paced activity encouraged   fatigue-related activity identified  Sleep/Rest Enhancement:   regular sleep/rest pattern promoted   relaxation techniques promoted  Activity Management:   Ambulated -L4   Ambulated in chandler - L4     Problem: Adult Inpatient Plan of Care  Goal: Plan of Care Review  4/3/2023 1000 by Carlos Madsen RN  Outcome: Ongoing, Progressing  Flowsheets (Taken 4/3/2023 1000)  Plan of Care Reviewed With: patient  Tolerated treatment with no known distress.  Ambulated from infusion center with steady gait.  4/3/2023 0959 by Carlos Madsen RN  Outcome: Ongoing, Progressing

## 2023-04-07 LAB — CALPROTECTIN STL-MCNT: 373.5 MCG/G

## 2023-04-12 ENCOUNTER — PATIENT MESSAGE (OUTPATIENT)
Dept: GASTROENTEROLOGY | Facility: CLINIC | Age: 83
End: 2023-04-12
Payer: OTHER GOVERNMENT

## 2023-05-29 ENCOUNTER — INFUSION (OUTPATIENT)
Dept: INFUSION THERAPY | Facility: HOSPITAL | Age: 83
End: 2023-05-29
Attending: INTERNAL MEDICINE
Payer: OTHER GOVERNMENT

## 2023-05-29 VITALS
BODY MASS INDEX: 25.66 KG/M2 | TEMPERATURE: 98 F | DIASTOLIC BLOOD PRESSURE: 61 MMHG | HEART RATE: 63 BPM | SYSTOLIC BLOOD PRESSURE: 124 MMHG | RESPIRATION RATE: 18 BRPM | WEIGHT: 169.31 LBS | HEIGHT: 68 IN

## 2023-05-29 DIAGNOSIS — K51.018 ULCERATIVE PANCOLITIS WITH OTHER COMPLICATION: Primary | ICD-10-CM

## 2023-05-29 LAB
ALBUMIN SERPL BCP-MCNC: 4.2 G/DL (ref 3.5–5.2)
ALP SERPL-CCNC: 57 U/L (ref 55–135)
ALT SERPL W/O P-5'-P-CCNC: 18 U/L (ref 10–44)
ANION GAP SERPL CALC-SCNC: 11 MMOL/L (ref 8–16)
AST SERPL-CCNC: 19 U/L (ref 10–40)
BASOPHILS # BLD AUTO: 0.04 K/UL (ref 0–0.2)
BASOPHILS NFR BLD: 0.4 % (ref 0–1.9)
BILIRUB SERPL-MCNC: 1 MG/DL (ref 0.1–1)
BUN SERPL-MCNC: 17 MG/DL (ref 8–23)
CALCIUM SERPL-MCNC: 9.9 MG/DL (ref 8.7–10.5)
CHLORIDE SERPL-SCNC: 101 MMOL/L (ref 95–110)
CO2 SERPL-SCNC: 27 MMOL/L (ref 23–29)
CREAT SERPL-MCNC: 1.2 MG/DL (ref 0.5–1.4)
CRP SERPL-MCNC: 1.9 MG/L (ref 0–8.2)
DIFFERENTIAL METHOD: ABNORMAL
EOSINOPHIL # BLD AUTO: 0.3 K/UL (ref 0–0.5)
EOSINOPHIL NFR BLD: 2.8 % (ref 0–8)
ERYTHROCYTE [DISTWIDTH] IN BLOOD BY AUTOMATED COUNT: 12.8 % (ref 11.5–14.5)
EST. GFR  (NO RACE VARIABLE): >60 ML/MIN/1.73 M^2
GLUCOSE SERPL-MCNC: 99 MG/DL (ref 70–110)
HCT VFR BLD AUTO: 39.7 % (ref 40–54)
HGB BLD-MCNC: 13.2 G/DL (ref 14–18)
IMM GRANULOCYTES # BLD AUTO: 0.02 K/UL (ref 0–0.04)
IMM GRANULOCYTES NFR BLD AUTO: 0.2 % (ref 0–0.5)
LYMPHOCYTES # BLD AUTO: 3.5 K/UL (ref 1–4.8)
LYMPHOCYTES NFR BLD: 36.6 % (ref 18–48)
MCH RBC QN AUTO: 30.6 PG (ref 27–31)
MCHC RBC AUTO-ENTMCNC: 33.2 G/DL (ref 32–36)
MCV RBC AUTO: 92 FL (ref 82–98)
MONOCYTES # BLD AUTO: 0.8 K/UL (ref 0.3–1)
MONOCYTES NFR BLD: 8.1 % (ref 4–15)
NEUTROPHILS # BLD AUTO: 4.9 K/UL (ref 1.8–7.7)
NEUTROPHILS NFR BLD: 51.9 % (ref 38–73)
NRBC BLD-RTO: 0 /100 WBC
PLATELET # BLD AUTO: 358 K/UL (ref 150–450)
PMV BLD AUTO: 10.3 FL (ref 9.2–12.9)
POTASSIUM SERPL-SCNC: 4.8 MMOL/L (ref 3.5–5.1)
PROT SERPL-MCNC: 7.5 G/DL (ref 6–8.4)
RBC # BLD AUTO: 4.31 M/UL (ref 4.6–6.2)
SODIUM SERPL-SCNC: 139 MMOL/L (ref 136–145)
WBC # BLD AUTO: 9.51 K/UL (ref 3.9–12.7)

## 2023-05-29 PROCEDURE — 96365 THER/PROPH/DIAG IV INF INIT: CPT | Mod: PN

## 2023-05-29 PROCEDURE — 63600175 PHARM REV CODE 636 W HCPCS: Mod: JZ,JG,PN | Performed by: INTERNAL MEDICINE

## 2023-05-29 PROCEDURE — 80053 COMPREHEN METABOLIC PANEL: CPT | Mod: PN | Performed by: INTERNAL MEDICINE

## 2023-05-29 PROCEDURE — 80280 DRUG ASSAY VEDOLIZUMAB: CPT | Performed by: INTERNAL MEDICINE

## 2023-05-29 PROCEDURE — 25000003 PHARM REV CODE 250: Mod: PN | Performed by: INTERNAL MEDICINE

## 2023-05-29 PROCEDURE — 85025 COMPLETE CBC W/AUTO DIFF WBC: CPT | Mod: PN | Performed by: INTERNAL MEDICINE

## 2023-05-29 PROCEDURE — 36415 COLL VENOUS BLD VENIPUNCTURE: CPT | Mod: PN | Performed by: INTERNAL MEDICINE

## 2023-05-29 PROCEDURE — 82397 CHEMILUMINESCENT ASSAY: CPT | Performed by: INTERNAL MEDICINE

## 2023-05-29 PROCEDURE — 86140 C-REACTIVE PROTEIN: CPT | Performed by: INTERNAL MEDICINE

## 2023-05-29 PROCEDURE — A4216 STERILE WATER/SALINE, 10 ML: HCPCS | Mod: PN | Performed by: INTERNAL MEDICINE

## 2023-05-29 RX ORDER — DIPHENHYDRAMINE HYDROCHLORIDE 50 MG/ML
25 INJECTION INTRAMUSCULAR; INTRAVENOUS
Status: CANCELLED | OUTPATIENT
Start: 2023-07-24

## 2023-05-29 RX ORDER — SODIUM CHLORIDE 0.9 % (FLUSH) 0.9 %
10 SYRINGE (ML) INJECTION
Status: CANCELLED | OUTPATIENT
Start: 2023-07-24

## 2023-05-29 RX ORDER — METHYLPREDNISOLONE SOD SUCC 125 MG
40 VIAL (EA) INJECTION
Status: CANCELLED | OUTPATIENT
Start: 2023-07-24

## 2023-05-29 RX ORDER — IPRATROPIUM BROMIDE AND ALBUTEROL SULFATE 2.5; .5 MG/3ML; MG/3ML
3 SOLUTION RESPIRATORY (INHALATION)
Status: CANCELLED | OUTPATIENT
Start: 2023-07-24

## 2023-05-29 RX ORDER — SODIUM CHLORIDE 0.9 % (FLUSH) 0.9 %
10 SYRINGE (ML) INJECTION
Status: DISCONTINUED | OUTPATIENT
Start: 2023-05-29 | End: 2023-05-29 | Stop reason: HOSPADM

## 2023-05-29 RX ORDER — EPINEPHRINE 1 MG/ML
0.3 INJECTION, SOLUTION, CONCENTRATE INTRAVENOUS
Status: CANCELLED | OUTPATIENT
Start: 2023-07-24

## 2023-05-29 RX ORDER — DIPHENHYDRAMINE HYDROCHLORIDE 50 MG/ML
25 INJECTION INTRAMUSCULAR; INTRAVENOUS
Status: ACTIVE | OUTPATIENT
Start: 2023-05-29 | End: 2023-05-29

## 2023-05-29 RX ORDER — METHYLPREDNISOLONE SOD SUCC 125 MG
40 VIAL (EA) INJECTION
Status: ACTIVE | OUTPATIENT
Start: 2023-05-29 | End: 2023-05-29

## 2023-05-29 RX ORDER — ACETAMINOPHEN 325 MG/1
650 TABLET ORAL
Status: ACTIVE | OUTPATIENT
Start: 2023-05-29 | End: 2023-05-29

## 2023-05-29 RX ORDER — ACETAMINOPHEN 325 MG/1
650 TABLET ORAL
Status: CANCELLED | OUTPATIENT
Start: 2023-07-24

## 2023-05-29 RX ORDER — HEPARIN 100 UNIT/ML
500 SYRINGE INTRAVENOUS
Status: CANCELLED | OUTPATIENT
Start: 2023-07-24

## 2023-05-29 RX ADMIN — VEDOLIZUMAB 300 MG: 300 INJECTION, POWDER, LYOPHILIZED, FOR SOLUTION INTRAVENOUS at 09:05

## 2023-05-29 RX ADMIN — SODIUM CHLORIDE: 9 INJECTION, SOLUTION INTRAVENOUS at 08:05

## 2023-05-29 RX ADMIN — Medication 10 ML: at 08:05

## 2023-05-29 NOTE — PLAN OF CARE
Problem: Fatigue  Goal: Improved Activity Tolerance  Intervention: Promote Improved Energy  Flowsheets (Taken 5/29/2023 0845)  Fatigue Management:   activity schedule adjusted   frequent rest breaks encouraged   paced activity encouraged   fatigue-related activity identified  Sleep/Rest Enhancement:   relaxation techniques promoted   regular sleep/rest pattern promoted   natural light exposure provided  Activity Management:   Ambulated -L4   Ambulated in chandler - L4   Up in stretcher chair - L1     Problem: Adult Inpatient Plan of Care  Goal: Patient-Specific Goal (Individualized)  Outcome: Ongoing, Progressing  Flowsheets (Taken 5/29/2023 0845)  Anxieties, Fears or Concerns: none  Individualized Care Needs: recliner, warm blanket, pillow, conversation     Problem: Adult Inpatient Plan of Care  Goal: Plan of Care Review  Outcome: Ongoing, Progressing  Flowsheets (Taken 5/29/2023 1020)  Plan of Care Reviewed With: patient   Pt tolerated his Entyvio infusion well, NAD. No new c/o voiced. Pt given a schedule and reviewed, pt verbalized understanding. Pt ambulated out of the clinic without difficulty.

## 2023-06-05 LAB
VEDOLIZUMAB AB SERPL IA-MCNC: <25 NG/ML
VEDOLIZUMAB SERPL IA-MCNC: 8.4 UG/ML

## 2023-06-07 ENCOUNTER — DOCUMENTATION ONLY (OUTPATIENT)
Dept: INFUSION THERAPY | Facility: HOSPITAL | Age: 83
End: 2023-06-07
Payer: OTHER GOVERNMENT

## 2023-06-07 ENCOUNTER — TELEPHONE (OUTPATIENT)
Dept: GASTROENTEROLOGY | Facility: CLINIC | Age: 83
End: 2023-06-07
Payer: OTHER GOVERNMENT

## 2023-06-07 DIAGNOSIS — K51.018 ULCERATIVE PANCOLITIS WITH OTHER COMPLICATION: Primary | ICD-10-CM

## 2023-06-07 NOTE — TELEPHONE ENCOUNTER
----- Message from Sonam Lomax LPN sent at 6/7/2023 10:12 AM CDT -----    ----- Message -----  From: Lencho Ivory MD  Sent: 6/7/2023   9:51 AM CDT  To: Celestina Alexander RN, Dianelys JOHNSON Staff    Plan to increase Entyvio to every 4 weeks. Please schedule when approved.

## 2023-06-21 ENCOUNTER — TELEPHONE (OUTPATIENT)
Dept: GASTROENTEROLOGY | Facility: CLINIC | Age: 83
End: 2023-06-21
Payer: OTHER GOVERNMENT

## 2023-06-21 NOTE — TELEPHONE ENCOUNTER
He is scheduled for a colonoscopy in September. Did you want me to move it up or switch him to another provider?

## 2023-07-24 ENCOUNTER — INFUSION (OUTPATIENT)
Dept: INFUSION THERAPY | Facility: HOSPITAL | Age: 83
End: 2023-07-24
Attending: INTERNAL MEDICINE
Payer: OTHER GOVERNMENT

## 2023-07-24 VITALS
SYSTOLIC BLOOD PRESSURE: 128 MMHG | WEIGHT: 173.31 LBS | HEART RATE: 62 BPM | BODY MASS INDEX: 26.27 KG/M2 | TEMPERATURE: 98 F | HEIGHT: 68 IN | RESPIRATION RATE: 16 BRPM | DIASTOLIC BLOOD PRESSURE: 74 MMHG

## 2023-07-24 DIAGNOSIS — K51.018 ULCERATIVE PANCOLITIS WITH OTHER COMPLICATION: Primary | ICD-10-CM

## 2023-07-24 PROCEDURE — 96365 THER/PROPH/DIAG IV INF INIT: CPT | Mod: PN

## 2023-07-24 PROCEDURE — 25000003 PHARM REV CODE 250: Mod: PN | Performed by: INTERNAL MEDICINE

## 2023-07-24 PROCEDURE — 63600175 PHARM REV CODE 636 W HCPCS: Mod: JZ,JG,PN | Performed by: INTERNAL MEDICINE

## 2023-07-24 RX ORDER — ACETAMINOPHEN 325 MG/1
650 TABLET ORAL
Status: CANCELLED | OUTPATIENT
Start: 2023-08-21

## 2023-07-24 RX ORDER — EPINEPHRINE 1 MG/ML
0.3 INJECTION, SOLUTION, CONCENTRATE INTRAVENOUS
Status: CANCELLED | OUTPATIENT
Start: 2023-08-21

## 2023-07-24 RX ORDER — METHYLPREDNISOLONE SOD SUCC 125 MG
40 VIAL (EA) INJECTION
Status: CANCELLED | OUTPATIENT
Start: 2023-08-21

## 2023-07-24 RX ORDER — DIPHENHYDRAMINE HYDROCHLORIDE 50 MG/ML
25 INJECTION INTRAMUSCULAR; INTRAVENOUS
Status: CANCELLED | OUTPATIENT
Start: 2023-08-21

## 2023-07-24 RX ORDER — SODIUM CHLORIDE 0.9 % (FLUSH) 0.9 %
10 SYRINGE (ML) INJECTION
Status: CANCELLED | OUTPATIENT
Start: 2023-08-21

## 2023-07-24 RX ORDER — HEPARIN 100 UNIT/ML
500 SYRINGE INTRAVENOUS
Status: CANCELLED | OUTPATIENT
Start: 2023-08-21

## 2023-07-24 RX ORDER — IPRATROPIUM BROMIDE AND ALBUTEROL SULFATE 2.5; .5 MG/3ML; MG/3ML
3 SOLUTION RESPIRATORY (INHALATION)
Status: CANCELLED | OUTPATIENT
Start: 2023-08-21

## 2023-07-24 RX ADMIN — SODIUM CHLORIDE: 9 INJECTION, SOLUTION INTRAVENOUS at 08:07

## 2023-07-24 RX ADMIN — VEDOLIZUMAB 300 MG: 300 INJECTION, POWDER, LYOPHILIZED, FOR SOLUTION INTRAVENOUS at 09:07

## 2023-07-24 NOTE — PLAN OF CARE
Pt tolerated Entyvio infusion well.  No s/s of infusion reaction noted.  Instructed to call MD with any problems

## 2023-08-18 ENCOUNTER — ANESTHESIA EVENT (OUTPATIENT)
Dept: ENDOSCOPY | Facility: HOSPITAL | Age: 83
End: 2023-08-18
Payer: OTHER GOVERNMENT

## 2023-08-18 ENCOUNTER — ANESTHESIA (OUTPATIENT)
Dept: ENDOSCOPY | Facility: HOSPITAL | Age: 83
End: 2023-08-18
Payer: OTHER GOVERNMENT

## 2023-08-18 ENCOUNTER — HOSPITAL ENCOUNTER (OUTPATIENT)
Facility: HOSPITAL | Age: 83
Discharge: HOME | End: 2023-08-18
Attending: INTERNAL MEDICINE | Admitting: INTERNAL MEDICINE
Payer: OTHER GOVERNMENT

## 2023-08-18 VITALS
TEMPERATURE: 98 F | WEIGHT: 173 LBS | DIASTOLIC BLOOD PRESSURE: 63 MMHG | HEIGHT: 68 IN | RESPIRATION RATE: 16 BRPM | HEART RATE: 59 BPM | OXYGEN SATURATION: 99 % | SYSTOLIC BLOOD PRESSURE: 122 MMHG | BODY MASS INDEX: 26.22 KG/M2

## 2023-08-18 DIAGNOSIS — K51.018 ULCERATIVE PANCOLITIS WITH OTHER COMPLICATION: Primary | ICD-10-CM

## 2023-08-18 DIAGNOSIS — K51.919 ULCERATIVE COLITIS WITH COMPLICATION: ICD-10-CM

## 2023-08-18 PROCEDURE — 37000009 HC ANESTHESIA EA ADD 15 MINS: Mod: PO | Performed by: INTERNAL MEDICINE

## 2023-08-18 PROCEDURE — 88305 TISSUE EXAM BY PATHOLOGIST: CPT | Mod: 26,,, | Performed by: STUDENT IN AN ORGANIZED HEALTH CARE EDUCATION/TRAINING PROGRAM

## 2023-08-18 PROCEDURE — 63600175 PHARM REV CODE 636 W HCPCS: Mod: PO | Performed by: NURSE ANESTHETIST, CERTIFIED REGISTERED

## 2023-08-18 PROCEDURE — 45380 PR COLONOSCOPY,BIOPSY: ICD-10-PCS | Mod: 33,,, | Performed by: INTERNAL MEDICINE

## 2023-08-18 PROCEDURE — 88305 TISSUE EXAM BY PATHOLOGIST: ICD-10-PCS | Mod: 26,,, | Performed by: STUDENT IN AN ORGANIZED HEALTH CARE EDUCATION/TRAINING PROGRAM

## 2023-08-18 PROCEDURE — 27201012 HC FORCEPS, HOT/COLD, DISP: Mod: PO | Performed by: INTERNAL MEDICINE

## 2023-08-18 PROCEDURE — 63600175 PHARM REV CODE 636 W HCPCS: Mod: PO | Performed by: INTERNAL MEDICINE

## 2023-08-18 PROCEDURE — D9220A PRA ANESTHESIA: ICD-10-PCS | Mod: CRNA,,, | Performed by: NURSE ANESTHETIST, CERTIFIED REGISTERED

## 2023-08-18 PROCEDURE — 45380 COLONOSCOPY AND BIOPSY: CPT | Mod: PT,PO | Performed by: INTERNAL MEDICINE

## 2023-08-18 PROCEDURE — D9220A PRA ANESTHESIA: ICD-10-PCS | Mod: ANES,,, | Performed by: ANESTHESIOLOGY

## 2023-08-18 PROCEDURE — 25000003 PHARM REV CODE 250: Mod: PO | Performed by: NURSE ANESTHETIST, CERTIFIED REGISTERED

## 2023-08-18 PROCEDURE — D9220A PRA ANESTHESIA: Mod: CRNA,,, | Performed by: NURSE ANESTHETIST, CERTIFIED REGISTERED

## 2023-08-18 PROCEDURE — 37000008 HC ANESTHESIA 1ST 15 MINUTES: Mod: PO | Performed by: INTERNAL MEDICINE

## 2023-08-18 PROCEDURE — 45380 COLONOSCOPY AND BIOPSY: CPT | Mod: 33,,, | Performed by: INTERNAL MEDICINE

## 2023-08-18 PROCEDURE — 88305 TISSUE EXAM BY PATHOLOGIST: CPT | Mod: 59,PO | Performed by: STUDENT IN AN ORGANIZED HEALTH CARE EDUCATION/TRAINING PROGRAM

## 2023-08-18 PROCEDURE — D9220A PRA ANESTHESIA: Mod: ANES,,, | Performed by: ANESTHESIOLOGY

## 2023-08-18 RX ORDER — SODIUM CHLORIDE 0.9 % (FLUSH) 0.9 %
10 SYRINGE (ML) INJECTION EVERY 6 HOURS PRN
Status: DISCONTINUED | OUTPATIENT
Start: 2023-08-18 | End: 2023-08-18 | Stop reason: HOSPADM

## 2023-08-18 RX ORDER — PROPOFOL 10 MG/ML
VIAL (ML) INTRAVENOUS
Status: DISCONTINUED | OUTPATIENT
Start: 2023-08-18 | End: 2023-08-18

## 2023-08-18 RX ORDER — LIDOCAINE HYDROCHLORIDE 20 MG/ML
INJECTION INTRAVENOUS
Status: DISCONTINUED | OUTPATIENT
Start: 2023-08-18 | End: 2023-08-18

## 2023-08-18 RX ORDER — SODIUM CHLORIDE, SODIUM LACTATE, POTASSIUM CHLORIDE, CALCIUM CHLORIDE 600; 310; 30; 20 MG/100ML; MG/100ML; MG/100ML; MG/100ML
INJECTION, SOLUTION INTRAVENOUS CONTINUOUS
Status: DISCONTINUED | OUTPATIENT
Start: 2023-08-18 | End: 2023-08-18 | Stop reason: HOSPADM

## 2023-08-18 RX ORDER — PHENYLEPHRINE HYDROCHLORIDE 10 MG/ML
INJECTION INTRAVENOUS
Status: DISCONTINUED | OUTPATIENT
Start: 2023-08-18 | End: 2023-08-18

## 2023-08-18 RX ADMIN — SODIUM CHLORIDE, POTASSIUM CHLORIDE, SODIUM LACTATE AND CALCIUM CHLORIDE: 600; 310; 30; 20 INJECTION, SOLUTION INTRAVENOUS at 09:08

## 2023-08-18 RX ADMIN — PHENYLEPHRINE HYDROCHLORIDE 100 MCG: 10 INJECTION, SOLUTION INTRAMUSCULAR; INTRAVENOUS; SUBCUTANEOUS at 09:08

## 2023-08-18 RX ADMIN — PROPOFOL 80 MG: 10 INJECTION, EMULSION INTRAVENOUS at 09:08

## 2023-08-18 RX ADMIN — PROPOFOL 30 MG: 10 INJECTION, EMULSION INTRAVENOUS at 09:08

## 2023-08-18 RX ADMIN — LIDOCAINE HYDROCHLORIDE 100 MG: 20 INJECTION INTRAVENOUS at 09:08

## 2023-08-18 RX ADMIN — PROPOFOL 50 MG: 10 INJECTION, EMULSION INTRAVENOUS at 09:08

## 2023-08-18 RX ADMIN — PROPOFOL 20 MG: 10 INJECTION, EMULSION INTRAVENOUS at 09:08

## 2023-08-18 NOTE — H&P
History & Physical - Short Stay  Gastroenterology      SUBJECTIVE:     Procedure: Colonoscopy    Chief Complaint/Indication for Procedure: Ulcerative colitis    PTA Medications   Medication Sig    aspirin 81 MG Chew 1 tablet.    cholecalciferol, vitamin D3, (VITAMIN D3) 25 mcg (1,000 unit) capsule Take 1,000 Units by mouth.    cholestyramine (QUESTRAN) 4 gram packet Take 1 packet (4 g total) by mouth once daily.    clopidogreL (PLAVIX) 75 mg tablet 75 mg.    CYANOCOBALAMIN, VITAMIN B-12, (VITAMIN B-12 ORAL) Take by mouth.    EScitalopram oxalate (LEXAPRO) 10 MG tablet Take 1 tablet by mouth once daily.    famotidine (PEPCID) 40 MG tablet 40 mg.    fluticasone-salmeterol diskus inhaler 250-50 mcg Wixela Inhub 250 mcg-50 mcg/dose powder for inhalation   Inhale 1 puff twice a day by inhalation route.    folic acid (FOLVITE) 1 MG tablet Take 1 mg by mouth once daily.    ketoconazole (NIZORAL) 2 % cream APPLY SMALL AMOUNT TOPICALLY ONCE DAILY FOR FUNGAL INFECTION    latanoprost 0.005 % ophthalmic solution Place 1 drop into both eyes every evening.    multivitamin (THERAGRAN) per tablet Take 1 tablet by mouth once daily.    omega 3-dha-epa-fish oil (FISH OIL) 100-160-1,000 mg Cap 1,000 mg.    tamsulosin (FLOMAX) 0.4 mg Cap tamsulosin 0.4 mg capsule    albuterol (PROVENTIL) 5 mg/mL nebulizer solution Take 2.5 mg by nebulization every 6 (six) hours as needed for Wheezing.       Review of patient's allergies indicates:  No Known Allergies     Past Medical History:   Diagnosis Date    Arthritis     B12 deficiency     Cholelithiasis     Chronic back pain     Colon polyp     Diverticulosis     GERD (gastroesophageal reflux disease)     Glaucoma     left eye    Hyperlipemia     JUAN DIEGO (obstructive sleep apnea)     denies CPAP use    Skin cancer     Ulcerative colitis 1961    Vitamin D deficiency      Past Surgical History:   Procedure Laterality Date    BACK SURGERY      CATARACT EXTRACTION W/ INTRAOCULAR LENS IMPLANT Right      COLONOSCOPY  2014    COLONOSCOPY  03/25/2015    diverticulosis, otherwise normal findings, biopsies taken from 4 quadrants- see media section for biopsy report, repeat in 1 year    COLONOSCOPY N/A 6/1/2016    Procedure: COLONOSCOPY;  Surgeon: Ravindra Ervin MD;  Location: [facility] ENDO;  Service: Endoscopy;  Laterality: N/A;    COLONOSCOPY N/A 9/1/2022    Procedure: COLONOSCOPY;  Surgeon: Lencho Ivory MD;  Location: [facility] ENDO;  Service: Endoscopy;  Laterality: N/A;    HERNIA REPAIR      rt inguinal    LUMBAR FUSION      L3,4,5    MULTIPLE TOOTH EXTRACTIONS      pain pump placement  04/08/2010    dilaudid/baclofen    SKIN BIOPSY      SKIN CANCER EXCISION      UPPER GASTROINTESTINAL ENDOSCOPY  10/26/2009    hiatal hernia     Family History   Problem Relation Age of Onset    Ulcerative colitis Son     Colon cancer Neg Hx     Colon polyps Neg Hx      Social History     Tobacco Use    Smoking status: Never    Smokeless tobacco: Never   Substance Use Topics    Alcohol use: No    Drug use: No         OBJECTIVE:     Vital Signs (Most Recent)  Temp: 97 °F (36.1 °C) (08/18/23 0906)  Pulse: 67 (08/18/23 0906)  Resp: 17 (08/18/23 0906)  BP: 124/63 (08/18/23 0906)  SpO2: 97 % (08/18/23 0906)    Physical Exam:                                                       GENERAL:  Comfortable, in no acute distress.                                 HEENT EXAM:  Nonicteric.  No adenopathy.  Oropharynx is clear.               NECK:  Supple.                                                               LUNGS:  Clear.                                                               CARDIAC:  Regular rate and rhythm.  S1, S2.  No murmur.                      ABDOMEN:  Soft, positive bowel sounds, nontender.  No hepatosplenomegaly or masses.  No rebound or guarding.                                             EXTREMITIES:  No edema.     MENTAL STATUS:  Normal, alert and oriented.      ASSESSMENT/PLAN:     Assessment: Ulcerative  colitis    Plan: Colonoscopy

## 2023-08-18 NOTE — ANESTHESIA POSTPROCEDURE EVALUATION
Anesthesia Post Evaluation    Patient: Alfie Olson    Procedure(s) Performed: Procedure(s) (LRB):  COLONOSCOPY (N/A)    Final Anesthesia Type: general      Patient location during evaluation: PACU  Patient participation: Yes- Able to Participate  Level of consciousness: awake and alert and oriented  Post-procedure vital signs: reviewed and stable  Pain management: adequate  Airway patency: patent    PONV status at discharge: No PONV  Anesthetic complications: no      Cardiovascular status: blood pressure returned to baseline and stable  Respiratory status: unassisted and spontaneous ventilation  Hydration status: euvolemic  Follow-up not needed.          Vitals Value Taken Time   /63 08/18/23 1015   Temp 36.7 °C (98 °F) 08/18/23 1005   Pulse 59 08/18/23 1015   Resp 16 08/18/23 1015   SpO2 99 % 08/18/23 1015         No case tracking events are documented in the log.      Pain/Loly Score: Loly Score: 10 (8/18/2023 10:15 AM)

## 2023-08-18 NOTE — PROVATION PATIENT INSTRUCTIONS
Discharge Summary/Instructions after an Endoscopic Procedure  Patient Name: Alfie Olosn  Patient MRN: [id number]  Patient YOB: 1940  Friday, August 18, 2023  Lencho Ivory MD  Dear patient,  As a result of recent federal legislation (The Federal Cures Act), you may   receive lab or pathology results from your procedure in your [facility]   account before your physician is able to contact you. Your physician or   their representative will relay the results to you with their   recommendations at their soonest availability.  Thank you,  RESTRICTIONS:  During your procedure today, you received medications for sedation.  These   medications may affect your judgment, balance and coordination.  Therefore,   for 24 hours, you have the following restrictions:   - DO NOT drive a car, operate machinery, make legal/financial decisions,   sign important papers or drink alcohol.    ACTIVITY:  Today: no heavy lifting, straining or running due to procedural   sedation/anesthesia.  The following day: return to full activity including work.  DIET:  Eat and drink normally unless instructed otherwise.     TREATMENT FOR COMMON SIDE EFFECTS:  - Mild abdominal pain, nausea, belching, bloating or excessive gas:  rest,   eat lightly and use a heating pad.  - Sore Throat: treat with throat lozenges and/or gargle with warm salt   water.  - Because air was used during the procedure, expelling large amounts of air   from your rectum or belching is normal.  - If a bowel prep was taken, you may not have a bowel movement for 1-3 days.    This is normal.  SYMPTOMS TO WATCH FOR AND REPORT TO YOUR PHYSICIAN:  1. Abdominal pain or bloating, other than gas cramps.  2. Chest pain.  3. Back pain.  4. Signs of infection such as: chills or fever occurring within 24 hours   after the procedure.  5. Rectal bleeding, which would show as bright red, maroon, or black stools.   (A tablespoon of blood from the rectum is not serious, especially  if   hemorrhoids are present.)  6. Vomiting.  7. Weakness or dizziness.  GO DIRECTLY TO THE NEAREST EMERGENCY ROOM IF YOU HAVE ANY OF THE FOLLOWING:      Difficulty breathing              Chills and/or fever over 101 F   Persistent vomiting and/or vomiting blood   Severe abdominal pain   Severe chest pain   Black, tarry stools   Bleeding- more than one tablespoon   Any other symptom or condition that you feel may need urgent attention  Your doctor recommends these additional instructions:  If any biopsies were taken, your doctors clinic will contact you in 1 to 2   weeks with any results.  You are being discharged to home.   Advance your diet as tolerated.   Continue your present medications.   We are waiting for your pathology results.   You have a contact number available for emergencies.  The signs and symptoms   of potential delayed complications were discussed with you.  You may return   to normal activities tomorrow.  Written discharge instructions were   provided to you.   Your physician has recommended a repeat colonoscopy in two years for   surveillance.  For questions, problems or results please call your physician - Lencho Ivory MD at Work:  [phone].  EMERGENCY PHONE NUMBER: [phone], LAB RESULTS: [phone]  IF A COMPLICATION OR EMERGENCY SITUATION ARISES AND YOU ARE UNABLE TO REACH   YOUR PHYSICIAN - GO DIRECTLY TO THE EMERGENCY ROOM.  ___________________________________________  Nurse Signature  ___________________________________________  Patient/Designated Responsible Party Signature  Lencho Ivory MD  8/18/2023 10:08:44 AM  This report has been verified and signed electronically.  Dear patient,  As a result of recent federal legislation (The Federal Cures Act), you may   receive lab or pathology results from your procedure in your [facility]   account before your physician is able to contact you. Your physician or   their representative will relay the results to you with  their   recommendations at their soonest availability.  Thank you.  PROVATION

## 2023-08-18 NOTE — ANESTHESIA PREPROCEDURE EVALUATION
08/18/2023  Alfie Olson is a 82 y.o., male.      Pre-op Assessment    I have reviewed the Patient Summary Reports.     I have reviewed the Nursing Notes. I have reviewed the NPO Status.   I have reviewed the Medications.     Review of Systems  Anesthesia Hx:  No problems with previous Anesthesia    Social:  Non-Smoker    Hematology/Oncology:         -- Cancer in past history:   Cardiovascular:   hyperlipidemia    Pulmonary:   Sleep Apnea    Renal/:  Renal/ Normal     Hepatic/GI:   PUD, GERD, well controlled    Musculoskeletal:   Arthritis   Spine Disorders: Chronic Pain    Neurological:  Neurology Normal    Endocrine:  Endocrine Normal        Physical Exam  General: Well nourished, Cooperative, Alert and Oriented    Airway:  Mallampati: II   Mouth Opening: Normal  TM Distance: Normal  Neck ROM: Normal ROM        Anesthesia Plan  Type of Anesthesia, risks & benefits discussed:    Anesthesia Type: Gen ETT, Gen Supraglottic Airway, Gen Natural Airway, MAC  Intra-op Monitoring Plan: Standard ASA Monitors  Post Op Pain Control Plan: multimodal analgesia  Induction:  IV  Airway Plan: Direct, Video and Fiberoptic, Post-Induction  Informed Consent: Informed consent signed with the Patient and all parties understand the risks and agree with anesthesia plan.  All questions answered.   ASA Score: 3  Anesthesia Plan Notes: Advanced age      Ready For Surgery From Anesthesia Perspective.     .

## 2023-08-18 NOTE — TRANSFER OF CARE
"Anesthesia Transfer of Care Note    Patient: Alfie Olson    Procedure(s) Performed: Procedure(s) (LRB):  COLONOSCOPY (N/A)    Patient location: PACU    Anesthesia Type: general    Transport from OR: Transported from OR on room air with adequate spontaneous ventilation    Post pain: adequate analgesia    Post assessment: no apparent anesthetic complications and tolerated procedure well    Post vital signs: stable    Level of consciousness: awake    Nausea/Vomiting: no nausea/vomiting    Complications: none    Transfer of care protocol was followed      Last vitals:   Visit Vitals  /63 (BP Location: Right arm, Patient Position: Lying)   Pulse 67   Temp 36.1 °C (97 °F) (Temporal)   Resp 17   Ht 5' 8" (1.727 m)   Wt 78.5 kg (173 lb)   SpO2 97%   BMI 26.30 kg/m²     "

## 2023-08-21 ENCOUNTER — INFUSION (OUTPATIENT)
Dept: INFUSION THERAPY | Facility: HOSPITAL | Age: 83
End: 2023-08-21
Attending: INTERNAL MEDICINE
Payer: OTHER GOVERNMENT

## 2023-08-21 VITALS
RESPIRATION RATE: 18 BRPM | WEIGHT: 174.63 LBS | TEMPERATURE: 98 F | BODY MASS INDEX: 26.47 KG/M2 | HEART RATE: 58 BPM | DIASTOLIC BLOOD PRESSURE: 54 MMHG | SYSTOLIC BLOOD PRESSURE: 97 MMHG | HEIGHT: 68 IN

## 2023-08-21 DIAGNOSIS — K51.018 ULCERATIVE PANCOLITIS WITH OTHER COMPLICATION: Primary | ICD-10-CM

## 2023-08-21 LAB
ALBUMIN SERPL BCP-MCNC: 4 G/DL (ref 3.5–5.2)
ALP SERPL-CCNC: 55 U/L (ref 55–135)
ALT SERPL W/O P-5'-P-CCNC: 20 U/L (ref 10–44)
ANION GAP SERPL CALC-SCNC: 14 MMOL/L (ref 8–16)
AST SERPL-CCNC: 32 U/L (ref 10–40)
BASOPHILS # BLD AUTO: 0.04 K/UL (ref 0–0.2)
BASOPHILS NFR BLD: 0.4 % (ref 0–1.9)
BILIRUB SERPL-MCNC: 0.8 MG/DL (ref 0.1–1)
BUN SERPL-MCNC: 16 MG/DL (ref 8–23)
CALCIUM SERPL-MCNC: 8.9 MG/DL (ref 8.7–10.5)
CHLORIDE SERPL-SCNC: 104 MMOL/L (ref 95–110)
CO2 SERPL-SCNC: 22 MMOL/L (ref 23–29)
CREAT SERPL-MCNC: 1 MG/DL (ref 0.5–1.4)
DIFFERENTIAL METHOD: ABNORMAL
EOSINOPHIL # BLD AUTO: 0.4 K/UL (ref 0–0.5)
EOSINOPHIL NFR BLD: 4 % (ref 0–8)
ERYTHROCYTE [DISTWIDTH] IN BLOOD BY AUTOMATED COUNT: 12.6 % (ref 11.5–14.5)
EST. GFR  (NO RACE VARIABLE): >60 ML/MIN/1.73 M^2
GLUCOSE SERPL-MCNC: 113 MG/DL (ref 70–110)
HCT VFR BLD AUTO: 37.7 % (ref 40–54)
HGB BLD-MCNC: 12.4 G/DL (ref 14–18)
IMM GRANULOCYTES # BLD AUTO: 0.03 K/UL (ref 0–0.04)
IMM GRANULOCYTES NFR BLD AUTO: 0.3 % (ref 0–0.5)
LYMPHOCYTES # BLD AUTO: 3.2 K/UL (ref 1–4.8)
LYMPHOCYTES NFR BLD: 33.5 % (ref 18–48)
MCH RBC QN AUTO: 30.6 PG (ref 27–31)
MCHC RBC AUTO-ENTMCNC: 32.9 G/DL (ref 32–36)
MCV RBC AUTO: 93 FL (ref 82–98)
MONOCYTES # BLD AUTO: 0.8 K/UL (ref 0.3–1)
MONOCYTES NFR BLD: 8.6 % (ref 4–15)
NEUTROPHILS # BLD AUTO: 5.1 K/UL (ref 1.8–7.7)
NEUTROPHILS NFR BLD: 53.2 % (ref 38–73)
NRBC BLD-RTO: 0 /100 WBC
PLATELET # BLD AUTO: 256 K/UL (ref 150–450)
PMV BLD AUTO: 10.8 FL (ref 9.2–12.9)
POTASSIUM SERPL-SCNC: 4.8 MMOL/L (ref 3.5–5.1)
PROT SERPL-MCNC: 7.5 G/DL (ref 6–8.4)
RBC # BLD AUTO: 4.05 M/UL (ref 4.6–6.2)
SODIUM SERPL-SCNC: 140 MMOL/L (ref 136–145)
WBC # BLD AUTO: 9.5 K/UL (ref 3.9–12.7)

## 2023-08-21 PROCEDURE — 25000003 PHARM REV CODE 250: Mod: PN | Performed by: INTERNAL MEDICINE

## 2023-08-21 PROCEDURE — 80053 COMPREHEN METABOLIC PANEL: CPT | Mod: PN | Performed by: INTERNAL MEDICINE

## 2023-08-21 PROCEDURE — 63600175 PHARM REV CODE 636 W HCPCS: Mod: JZ,JG,PN | Performed by: INTERNAL MEDICINE

## 2023-08-21 PROCEDURE — 85025 COMPLETE CBC W/AUTO DIFF WBC: CPT | Mod: PN | Performed by: INTERNAL MEDICINE

## 2023-08-21 PROCEDURE — 96365 THER/PROPH/DIAG IV INF INIT: CPT | Mod: PN

## 2023-08-21 RX ORDER — SODIUM CHLORIDE 0.9 % (FLUSH) 0.9 %
10 SYRINGE (ML) INJECTION
Status: DISCONTINUED | OUTPATIENT
Start: 2023-08-21 | End: 2023-08-21 | Stop reason: HOSPADM

## 2023-08-21 RX ORDER — DIPHENHYDRAMINE HYDROCHLORIDE 50 MG/ML
25 INJECTION INTRAMUSCULAR; INTRAVENOUS
Status: CANCELLED | OUTPATIENT
Start: 2023-09-18

## 2023-08-21 RX ORDER — HEPARIN 100 UNIT/ML
500 SYRINGE INTRAVENOUS
Status: DISCONTINUED | OUTPATIENT
Start: 2023-08-21 | End: 2023-08-21 | Stop reason: HOSPADM

## 2023-08-21 RX ORDER — SODIUM CHLORIDE 0.9 % (FLUSH) 0.9 %
10 SYRINGE (ML) INJECTION
Status: CANCELLED | OUTPATIENT
Start: 2023-09-18

## 2023-08-21 RX ORDER — METHYLPREDNISOLONE SOD SUCC 125 MG
40 VIAL (EA) INJECTION
Status: CANCELLED | OUTPATIENT
Start: 2023-09-18

## 2023-08-21 RX ORDER — HEPARIN 100 UNIT/ML
500 SYRINGE INTRAVENOUS
Status: CANCELLED | OUTPATIENT
Start: 2023-09-18

## 2023-08-21 RX ORDER — IPRATROPIUM BROMIDE AND ALBUTEROL SULFATE 2.5; .5 MG/3ML; MG/3ML
3 SOLUTION RESPIRATORY (INHALATION)
Status: CANCELLED | OUTPATIENT
Start: 2023-09-18

## 2023-08-21 RX ORDER — EPINEPHRINE 1 MG/ML
0.3 INJECTION, SOLUTION, CONCENTRATE INTRAVENOUS
Status: CANCELLED | OUTPATIENT
Start: 2023-09-18

## 2023-08-21 RX ORDER — ACETAMINOPHEN 325 MG/1
650 TABLET ORAL
Status: CANCELLED | OUTPATIENT
Start: 2023-09-18

## 2023-08-21 RX ADMIN — VEDOLIZUMAB 300 MG: 300 INJECTION, POWDER, LYOPHILIZED, FOR SOLUTION INTRAVENOUS at 09:08

## 2023-08-21 RX ADMIN — SODIUM CHLORIDE: 9 INJECTION, SOLUTION INTRAVENOUS at 08:08

## 2023-08-22 LAB
COMMENT: NORMAL
FINAL PATHOLOGIC DIAGNOSIS: NORMAL
GROSS: NORMAL
Lab: NORMAL
MICROSCOPIC EXAM: NORMAL

## 2023-09-15 DIAGNOSIS — K51.018 ULCERATIVE PANCOLITIS WITH OTHER COMPLICATION: Primary | ICD-10-CM

## 2023-09-18 ENCOUNTER — INFUSION (OUTPATIENT)
Dept: INFUSION THERAPY | Facility: HOSPITAL | Age: 83
End: 2023-09-18
Attending: INTERNAL MEDICINE
Payer: OTHER GOVERNMENT

## 2023-09-18 VITALS
DIASTOLIC BLOOD PRESSURE: 56 MMHG | WEIGHT: 177 LBS | TEMPERATURE: 99 F | SYSTOLIC BLOOD PRESSURE: 107 MMHG | HEIGHT: 68 IN | BODY MASS INDEX: 26.83 KG/M2 | RESPIRATION RATE: 16 BRPM | HEART RATE: 51 BPM

## 2023-09-18 DIAGNOSIS — K51.018 ULCERATIVE PANCOLITIS WITH OTHER COMPLICATION: Primary | ICD-10-CM

## 2023-09-18 LAB
ALBUMIN SERPL BCP-MCNC: 3.8 G/DL (ref 3.5–5.2)
ALP SERPL-CCNC: 50 U/L (ref 55–135)
ALT SERPL W/O P-5'-P-CCNC: 21 U/L (ref 10–44)
ANION GAP SERPL CALC-SCNC: 8 MMOL/L (ref 8–16)
AST SERPL-CCNC: 24 U/L (ref 10–40)
BASOPHILS # BLD AUTO: 0.04 K/UL (ref 0–0.2)
BASOPHILS NFR BLD: 0.4 % (ref 0–1.9)
BILIRUB SERPL-MCNC: 0.9 MG/DL (ref 0.1–1)
BUN SERPL-MCNC: 16 MG/DL (ref 8–23)
CALCIUM SERPL-MCNC: 8.7 MG/DL (ref 8.7–10.5)
CHLORIDE SERPL-SCNC: 105 MMOL/L (ref 95–110)
CO2 SERPL-SCNC: 24 MMOL/L (ref 23–29)
CREAT SERPL-MCNC: 1.1 MG/DL (ref 0.5–1.4)
DIFFERENTIAL METHOD: ABNORMAL
EOSINOPHIL # BLD AUTO: 0.2 K/UL (ref 0–0.5)
EOSINOPHIL NFR BLD: 2.6 % (ref 0–8)
ERYTHROCYTE [DISTWIDTH] IN BLOOD BY AUTOMATED COUNT: 12.9 % (ref 11.5–14.5)
EST. GFR  (NO RACE VARIABLE): >60 ML/MIN/1.73 M^2
GLUCOSE SERPL-MCNC: 99 MG/DL (ref 70–110)
HCT VFR BLD AUTO: 35.5 % (ref 40–54)
HGB BLD-MCNC: 11.5 G/DL (ref 14–18)
IMM GRANULOCYTES # BLD AUTO: 0.02 K/UL (ref 0–0.04)
IMM GRANULOCYTES NFR BLD AUTO: 0.2 % (ref 0–0.5)
LYMPHOCYTES # BLD AUTO: 2.6 K/UL (ref 1–4.8)
LYMPHOCYTES NFR BLD: 27.7 % (ref 18–48)
MCH RBC QN AUTO: 30.2 PG (ref 27–31)
MCHC RBC AUTO-ENTMCNC: 32.4 G/DL (ref 32–36)
MCV RBC AUTO: 93 FL (ref 82–98)
MONOCYTES # BLD AUTO: 0.8 K/UL (ref 0.3–1)
MONOCYTES NFR BLD: 8.1 % (ref 4–15)
NEUTROPHILS # BLD AUTO: 5.7 K/UL (ref 1.8–7.7)
NEUTROPHILS NFR BLD: 61 % (ref 38–73)
NRBC BLD-RTO: 0 /100 WBC
PLATELET # BLD AUTO: 282 K/UL (ref 150–450)
PMV BLD AUTO: 10.8 FL (ref 9.2–12.9)
POTASSIUM SERPL-SCNC: 4.1 MMOL/L (ref 3.5–5.1)
PROT SERPL-MCNC: 6.8 G/DL (ref 6–8.4)
RBC # BLD AUTO: 3.81 M/UL (ref 4.6–6.2)
SODIUM SERPL-SCNC: 137 MMOL/L (ref 136–145)
WBC # BLD AUTO: 9.41 K/UL (ref 3.9–12.7)

## 2023-09-18 PROCEDURE — 86480 TB TEST CELL IMMUN MEASURE: CPT | Performed by: INTERNAL MEDICINE

## 2023-09-18 PROCEDURE — 25000003 PHARM REV CODE 250: Mod: PN | Performed by: INTERNAL MEDICINE

## 2023-09-18 PROCEDURE — 63600175 PHARM REV CODE 636 W HCPCS: Mod: JZ,JG,PN | Performed by: INTERNAL MEDICINE

## 2023-09-18 PROCEDURE — 96365 THER/PROPH/DIAG IV INF INIT: CPT | Mod: PN

## 2023-09-18 PROCEDURE — 80053 COMPREHEN METABOLIC PANEL: CPT | Mod: PO | Performed by: INTERNAL MEDICINE

## 2023-09-18 PROCEDURE — 85025 COMPLETE CBC W/AUTO DIFF WBC: CPT | Mod: PN | Performed by: INTERNAL MEDICINE

## 2023-09-18 RX ORDER — METHYLPREDNISOLONE SOD SUCC 125 MG
40 VIAL (EA) INJECTION
Status: CANCELLED | OUTPATIENT
Start: 2023-10-16

## 2023-09-18 RX ORDER — EPINEPHRINE 1 MG/ML
0.3 INJECTION, SOLUTION, CONCENTRATE INTRAVENOUS
Status: CANCELLED | OUTPATIENT
Start: 2023-10-16

## 2023-09-18 RX ORDER — DIPHENHYDRAMINE HYDROCHLORIDE 50 MG/ML
25 INJECTION INTRAMUSCULAR; INTRAVENOUS
Status: CANCELLED | OUTPATIENT
Start: 2023-10-16

## 2023-09-18 RX ORDER — HEPARIN 100 UNIT/ML
500 SYRINGE INTRAVENOUS
Status: CANCELLED | OUTPATIENT
Start: 2023-10-16

## 2023-09-18 RX ORDER — SODIUM CHLORIDE 0.9 % (FLUSH) 0.9 %
10 SYRINGE (ML) INJECTION
Status: CANCELLED | OUTPATIENT
Start: 2023-10-16

## 2023-09-18 RX ORDER — IPRATROPIUM BROMIDE AND ALBUTEROL SULFATE 2.5; .5 MG/3ML; MG/3ML
3 SOLUTION RESPIRATORY (INHALATION)
Status: CANCELLED | OUTPATIENT
Start: 2023-10-16

## 2023-09-18 RX ORDER — ACETAMINOPHEN 325 MG/1
650 TABLET ORAL
Status: CANCELLED | OUTPATIENT
Start: 2023-10-16

## 2023-09-18 RX ADMIN — SODIUM CHLORIDE: 9 INJECTION, SOLUTION INTRAVENOUS at 08:09

## 2023-09-18 RX ADMIN — VEDOLIZUMAB 300 MG: 300 INJECTION, POWDER, LYOPHILIZED, FOR SOLUTION INTRAVENOUS at 09:09

## 2023-09-18 NOTE — PLAN OF CARE
Pt tolerated Entyvio infusion well.  No s/s of infusion reaction noted.  Instructed to call MD with any problems.

## 2023-09-19 LAB
GAMMA INTERFERON BACKGROUND BLD IA-ACNC: 0.04 IU/ML
M TB IFN-G CD4+ BCKGRND COR BLD-ACNC: 0.01 IU/ML
M TB IFN-G CD4+ BCKGRND COR BLD-ACNC: 0.02 IU/ML
MITOGEN IGNF BCKGRD COR BLD-ACNC: 9.96 IU/ML
TB GOLD PLUS: NEGATIVE

## 2023-09-25 ENCOUNTER — TELEPHONE (OUTPATIENT)
Dept: GASTROENTEROLOGY | Facility: CLINIC | Age: 83
End: 2023-09-25
Payer: OTHER GOVERNMENT

## 2023-09-25 NOTE — TELEPHONE ENCOUNTER
----- Message from Lencho Ivory MD sent at 9/24/2023 12:44 PM CDT -----  Please schedule follow up in NP clinic

## 2023-10-12 DIAGNOSIS — K52.9 CHRONIC DIARRHEA: ICD-10-CM

## 2023-10-12 DIAGNOSIS — K51.00 ULCERATIVE PANCOLITIS: ICD-10-CM

## 2023-10-12 RX ORDER — CHOLESTYRAMINE 4 G/9G
4 POWDER, FOR SUSPENSION ORAL DAILY
Qty: 30 PACKET | Refills: 2 | Status: SHIPPED | OUTPATIENT
Start: 2023-10-12 | End: 2024-01-10

## 2023-10-16 ENCOUNTER — INFUSION (OUTPATIENT)
Dept: INFUSION THERAPY | Facility: HOSPITAL | Age: 83
End: 2023-10-16
Attending: INTERNAL MEDICINE
Payer: OTHER GOVERNMENT

## 2023-10-16 VITALS
WEIGHT: 172.81 LBS | HEART RATE: 63 BPM | DIASTOLIC BLOOD PRESSURE: 64 MMHG | BODY MASS INDEX: 26.19 KG/M2 | RESPIRATION RATE: 16 BRPM | OXYGEN SATURATION: 97 % | TEMPERATURE: 98 F | HEIGHT: 68 IN | SYSTOLIC BLOOD PRESSURE: 118 MMHG

## 2023-10-16 DIAGNOSIS — K51.018 ULCERATIVE PANCOLITIS WITH OTHER COMPLICATION: Primary | ICD-10-CM

## 2023-10-16 PROCEDURE — 25000003 PHARM REV CODE 250: Mod: PN | Performed by: INTERNAL MEDICINE

## 2023-10-16 PROCEDURE — 63600175 PHARM REV CODE 636 W HCPCS: Mod: JZ,JG,PN | Performed by: INTERNAL MEDICINE

## 2023-10-16 PROCEDURE — 96365 THER/PROPH/DIAG IV INF INIT: CPT | Mod: PN

## 2023-10-16 PROCEDURE — A4216 STERILE WATER/SALINE, 10 ML: HCPCS | Mod: PN | Performed by: INTERNAL MEDICINE

## 2023-10-16 RX ORDER — DIPHENHYDRAMINE HYDROCHLORIDE 50 MG/ML
25 INJECTION INTRAMUSCULAR; INTRAVENOUS
Status: ACTIVE | OUTPATIENT
Start: 2023-10-16 | End: 2023-10-16

## 2023-10-16 RX ORDER — SODIUM CHLORIDE 0.9 % (FLUSH) 0.9 %
10 SYRINGE (ML) INJECTION
Status: CANCELLED | OUTPATIENT
Start: 2023-11-13

## 2023-10-16 RX ORDER — ACETAMINOPHEN 325 MG/1
650 TABLET ORAL
Status: ACTIVE | OUTPATIENT
Start: 2023-10-16 | End: 2023-10-16

## 2023-10-16 RX ORDER — EPINEPHRINE 1 MG/ML
0.3 INJECTION, SOLUTION, CONCENTRATE INTRAVENOUS
Status: DISPENSED | OUTPATIENT
Start: 2023-10-16 | End: 2023-10-16

## 2023-10-16 RX ORDER — DIPHENHYDRAMINE HYDROCHLORIDE 50 MG/ML
25 INJECTION INTRAMUSCULAR; INTRAVENOUS
Status: CANCELLED | OUTPATIENT
Start: 2023-11-13

## 2023-10-16 RX ORDER — ACETAMINOPHEN 325 MG/1
650 TABLET ORAL
Status: CANCELLED | OUTPATIENT
Start: 2023-11-13

## 2023-10-16 RX ORDER — SODIUM CHLORIDE 0.9 % (FLUSH) 0.9 %
10 SYRINGE (ML) INJECTION
Status: DISCONTINUED | OUTPATIENT
Start: 2023-10-16 | End: 2023-10-16 | Stop reason: HOSPADM

## 2023-10-16 RX ORDER — IPRATROPIUM BROMIDE AND ALBUTEROL SULFATE 2.5; .5 MG/3ML; MG/3ML
3 SOLUTION RESPIRATORY (INHALATION)
Status: DISCONTINUED | OUTPATIENT
Start: 2023-10-16 | End: 2023-10-16

## 2023-10-16 RX ORDER — METHYLPREDNISOLONE SOD SUCC 125 MG
40 VIAL (EA) INJECTION
Status: CANCELLED | OUTPATIENT
Start: 2023-11-13

## 2023-10-16 RX ORDER — HEPARIN 100 UNIT/ML
500 SYRINGE INTRAVENOUS
Status: CANCELLED | OUTPATIENT
Start: 2023-11-13

## 2023-10-16 RX ORDER — IPRATROPIUM BROMIDE AND ALBUTEROL SULFATE 2.5; .5 MG/3ML; MG/3ML
3 SOLUTION RESPIRATORY (INHALATION)
Status: CANCELLED | OUTPATIENT
Start: 2023-11-13

## 2023-10-16 RX ORDER — EPINEPHRINE 1 MG/ML
0.3 INJECTION, SOLUTION, CONCENTRATE INTRAVENOUS
Status: CANCELLED | OUTPATIENT
Start: 2023-11-13

## 2023-10-16 RX ADMIN — SODIUM CHLORIDE: 9 INJECTION, SOLUTION INTRAVENOUS at 08:10

## 2023-10-16 RX ADMIN — Medication 10 ML: at 09:10

## 2023-10-16 RX ADMIN — VEDOLIZUMAB 300 MG: 300 INJECTION, POWDER, LYOPHILIZED, FOR SOLUTION INTRAVENOUS at 08:10

## 2023-10-16 NOTE — PLAN OF CARE
Pt arrived to clinic today for Entyvio infusion and tolerated well with no changes throughout therapy. Pt aware of side effects and number to call for any needs and discharged to home in NAD. Pt aware of f/u appts.

## 2023-10-16 NOTE — PLAN OF CARE
Problem: Adult Inpatient Plan of Care  Goal: Plan of Care Review  Outcome: Ongoing, Progressing  Flowsheets (Taken 10/16/2023 0930)  Plan of Care Reviewed With: patient  Goal: Patient-Specific Goal (Individualized)  Outcome: Ongoing, Progressing  Flowsheets (Taken 10/16/2023 0930)  Anxieties, Fears or Concerns: none voiced  Individualized Care Needs: education, conversation, pillow, blanket, recliner  Goal: Optimal Comfort and Wellbeing  Outcome: Ongoing, Progressing  Intervention: Provide Person-Centered Care  Flowsheets (Taken 10/16/2023 0930)  Trust Relationship/Rapport:   questions answered   care explained   choices provided   questions encouraged   emotional support provided   empathic listening provided   thoughts/feelings acknowledged   reassurance provided     Problem: Fatigue  Goal: Improved Activity Tolerance  Outcome: Ongoing, Progressing  Intervention: Promote Improved Energy  Flowsheets (Taken 10/16/2023 0930)  Fatigue Management:   paced activity encouraged   frequent rest breaks encouraged   fatigue-related activity identified  Sleep/Rest Enhancement:   therapeutic touch utilized   relaxation techniques promoted   noise level reduced   family presence promoted   consistent schedule promoted   natural light exposure provided  Activity Management:   Ambulated -L4   Up in chair - L3

## 2023-10-25 ENCOUNTER — OFFICE VISIT (OUTPATIENT)
Dept: GASTROENTEROLOGY | Facility: CLINIC | Age: 83
End: 2023-10-25
Payer: OTHER GOVERNMENT

## 2023-10-25 VITALS — BODY MASS INDEX: 25.86 KG/M2 | WEIGHT: 170.63 LBS | HEIGHT: 68 IN

## 2023-10-25 DIAGNOSIS — Z79.01 CURRENT USE OF ANTICOAGULANT THERAPY: ICD-10-CM

## 2023-10-25 DIAGNOSIS — Z86.010 HISTORY OF COLON POLYPS: ICD-10-CM

## 2023-10-25 DIAGNOSIS — K51.00 ULCERATIVE PANCOLITIS WITHOUT COMPLICATION: Primary | ICD-10-CM

## 2023-10-25 DIAGNOSIS — Z87.19 HISTORY OF GASTROESOPHAGEAL REFLUX (GERD): ICD-10-CM

## 2023-10-25 PROCEDURE — 99213 OFFICE O/P EST LOW 20 MIN: CPT | Mod: S$PBB,,, | Performed by: NURSE PRACTITIONER

## 2023-10-25 PROCEDURE — 99999 PR PBB SHADOW E&M-EST. PATIENT-LVL IV: ICD-10-PCS | Mod: PBBFAC,,, | Performed by: NURSE PRACTITIONER

## 2023-10-25 PROCEDURE — 99213 PR OFFICE/OUTPT VISIT, EST, LEVL III, 20-29 MIN: ICD-10-PCS | Mod: S$PBB,,, | Performed by: NURSE PRACTITIONER

## 2023-10-25 PROCEDURE — 99999 PR PBB SHADOW E&M-EST. PATIENT-LVL IV: CPT | Mod: PBBFAC,,, | Performed by: NURSE PRACTITIONER

## 2023-10-25 PROCEDURE — 99214 OFFICE O/P EST MOD 30 MIN: CPT | Mod: PBBFAC,PO | Performed by: NURSE PRACTITIONER

## 2023-10-25 RX ORDER — BUDESONIDE AND FORMOTEROL FUMARATE DIHYDRATE 160; 4.5 UG/1; UG/1
AEROSOL RESPIRATORY (INHALATION)
COMMUNITY

## 2023-10-25 RX ORDER — MONTELUKAST SODIUM 10 MG/1
10 TABLET ORAL
COMMUNITY
Start: 2023-10-13

## 2023-10-25 RX ORDER — MUPIROCIN 20 MG/G
OINTMENT TOPICAL 3 TIMES DAILY
COMMUNITY
Start: 2023-06-15

## 2023-10-25 RX ORDER — LEVOFLOXACIN 750 MG/1
750 TABLET ORAL
COMMUNITY
Start: 2023-06-15

## 2023-10-25 RX ORDER — AMMONIUM LACTATE 12 G/100G
LOTION TOPICAL
COMMUNITY
Start: 2023-10-13

## 2023-10-25 NOTE — PROGRESS NOTES
Subjective:       Patient ID: Alfie Olson is a 83 y.o. male, Body mass index is 25.95 kg/m².    Chief Complaint: Follow-up      Established patient of Dr. Ivory, Dr. Ervin & myself.     Diarrhea   This is a chronic problem. The current episode started more than 1 year ago. The problem occurs less than 2 times per day (intermittent). The problem has been rapidly improving. Diarrhea characteristics: describes stool as type 3, 4 or 6 on bristol scale; denies bloody stools. The patient states that diarrhea does not awaken him from sleep. Associated symptoms include abdominal pain (generalized abdominal pain; intermittent) and increased flatus. Pertinent negatives include no bloating, chills, coughing, fever, vomiting or weight loss. The symptoms are aggravated by stress (certain foods). There are no known risk factors (denies recent antibiotics, hospitalization or foreign travel). He has tried anti-motility drug and change of diet (Past: Sulfasalazine, mesalamine and steroids; Currently: Entyvio 300 mg every 4 weeks, Questran 4 gm once daily and OTC Imodium PRN- helps) for the symptoms. His past medical history is significant for inflammatory bowel disease. There is no history of bowel resection, irritable bowel syndrome or a recent abdominal surgery.     Review of Systems   Constitutional:  Negative for appetite change, chills, fever, unexpected weight change and weight loss.   HENT:  Negative for trouble swallowing.    Respiratory:  Negative for cough and shortness of breath.    Cardiovascular:  Negative for chest pain.   Gastrointestinal:  Positive for abdominal pain (generalized abdominal pain; intermittent), diarrhea and flatus. Negative for abdominal distention, anal bleeding, bloating, blood in stool, constipation, nausea, rectal pain and vomiting.        Hx of GERD- well controlled taking Famotidine 40 mg once daily   Genitourinary:  Negative for difficulty urinating and dysuria.   Musculoskeletal:   Negative for gait problem.   Skin:  Negative for rash.   Neurological:  Negative for speech difficulty.   Psychiatric/Behavioral:  Negative for confusion.        Past Medical History:   Diagnosis Date    Arthritis     B12 deficiency     Cholelithiasis     Chronic back pain     Colon polyp     Diverticulosis     GERD (gastroesophageal reflux disease)     Glaucoma     left eye    Hyperlipemia     JUAN DIEGO (obstructive sleep apnea)     denies CPAP use    Skin cancer     Ulcerative colitis 1961    Vitamin D deficiency       Past Surgical History:   Procedure Laterality Date    BACK SURGERY      CATARACT EXTRACTION W/ INTRAOCULAR LENS IMPLANT Right     COLONOSCOPY  2014    COLONOSCOPY  03/25/2015    diverticulosis, otherwise normal findings, biopsies taken from 4 quadrants- see media section for biopsy report, repeat in 1 year    COLONOSCOPY N/A 6/1/2016    Procedure: COLONOSCOPY;  Surgeon: Ravindra Ervin MD;  Location: [facility] ENDO;  Service: Endoscopy;  Laterality: N/A;    COLONOSCOPY N/A 9/1/2022    Procedure: COLONOSCOPY;  Surgeon: Lencho Ivory MD;  [facility];  Service: Endoscopy;  Laterality: N/A;    COLONOSCOPY N/A 8/18/2023    Procedure: COLONOSCOPY;  Surgeon: Lencho Ivory MD;  Location: [facility];  Service: Endoscopy;  Laterality: N/A;    HERNIA REPAIR      rt inguinal    LUMBAR FUSION      L3,4,5    MULTIPLE TOOTH EXTRACTIONS      pain pump placement  04/08/2010    dilaudid/baclofen    SKIN BIOPSY      SKIN CANCER EXCISION      UPPER GASTROINTESTINAL ENDOSCOPY  10/26/2009    hiatal hernia      Family History   Problem Relation Age of Onset    Ulcerative colitis Son     Colon cancer Neg Hx     Colon polyps Neg Hx       Wt Readings from Last 10 Encounters:   10/25/23 77.4 kg (170 lb 10.2 oz)   10/16/23 78.4 kg (172 lb 13.5 oz)   09/18/23 80.3 kg (177 lb 0.5 oz)   08/21/23 79.2 kg (174 lb 9.7 oz)   08/15/23 78.5 kg (173 lb)   07/24/23 78.6 kg (173 lb 4.5 oz)   05/29/23 76.8 kg (169 lb 5 oz)    04/03/23 77 kg (169 lb 13.8 oz)   03/23/23 77.1 kg (169 lb 15.6 oz)   02/06/23 75.9 kg (167 lb 5.3 oz)     Lab Results   Component Value Date    WBC 9.41 09/18/2023    HGB 11.5 (L) 09/18/2023    HCT 35.5 (L) 09/18/2023    MCV 93 09/18/2023     09/18/2023     CMP  Sodium   Date Value Ref Range Status   09/18/2023 137 136 - 145 mmol/L Final     Potassium   Date Value Ref Range Status   09/18/2023 4.1 3.5 - 5.1 mmol/L Final     Chloride   Date Value Ref Range Status   09/18/2023 105 95 - 110 mmol/L Final     CO2   Date Value Ref Range Status   09/18/2023 24 23 - 29 mmol/L Final     Glucose   Date Value Ref Range Status   09/18/2023 99 70 - 110 mg/dL Final     BUN   Date Value Ref Range Status   09/18/2023 16 8 - 23 mg/dL Final     Creatinine   Date Value Ref Range Status   09/18/2023 1.1 0.5 - 1.4 mg/dL Final     Calcium   Date Value Ref Range Status   09/18/2023 8.7 8.7 - 10.5 mg/dL Final     Total Protein   Date Value Ref Range Status   09/18/2023 6.8 6.0 - 8.4 g/dL Final     Albumin   Date Value Ref Range Status   09/18/2023 3.8 3.5 - 5.2 g/dL Final     Total Bilirubin   Date Value Ref Range Status   09/18/2023 0.9 0.1 - 1.0 mg/dL Final     Comment:     For infants and newborns, interpretation of results should be based  on gestational age, weight and in agreement with clinical  observations.    Premature Infant recommended reference ranges:  Up to 24 hours.............<8.0 mg/dL  Up to 48 hours............<12.0 mg/dL  3-5 days..................<15.0 mg/dL  6-29 days.................<15.0 mg/dL       Alkaline Phosphatase   Date Value Ref Range Status   09/18/2023 50 (L) 55 - 135 U/L Final     AST   Date Value Ref Range Status   09/18/2023 24 10 - 40 U/L Final     ALT   Date Value Ref Range Status   09/18/2023 21 10 - 44 U/L Final     Anion Gap   Date Value Ref Range Status   09/18/2023 8 8 - 16 mmol/L Final              Reviewed prior medical records including radiology report of CT of abdomen and pelvis  8/29/18 & endoscopy history (see surgical history).     Objective:      Physical Exam  Constitutional:       General: He is not in acute distress.     Appearance: He is well-developed.   HENT:      Head: Normocephalic.      Right Ear: Hearing normal.      Left Ear: Hearing normal.      Nose: Nose normal.      Mouth/Throat:      Mouth: No oral lesions.      Pharynx: Uvula midline.   Eyes:      General: Lids are normal.      Conjunctiva/sclera: Conjunctivae normal.      Pupils: Pupils are equal, round, and reactive to light.   Neck:      Trachea: Trachea normal.   Cardiovascular:      Rate and Rhythm: Normal rate and regular rhythm.      Heart sounds: Normal heart sounds. No murmur heard.  Pulmonary:      Effort: Pulmonary effort is normal. No respiratory distress.      Breath sounds: Normal breath sounds. No stridor. No wheezing.   Abdominal:      General: Bowel sounds are normal. There is no distension.      Palpations: Abdomen is soft. There is no mass.      Tenderness: There is abdominal tenderness (mild) in the right upper quadrant and right lower quadrant. There is no guarding or rebound.   Musculoskeletal:         General: Normal range of motion.      Cervical back: Normal range of motion.   Skin:     General: Skin is warm and dry.      Findings: No rash.      Comments: Non jaundiced   Neurological:      Mental Status: He is alert and oriented to person, place, and time.   Psychiatric:         Speech: Speech normal.         Behavior: Behavior normal. Behavior is cooperative.           Assessment:       1. Ulcerative pancolitis without complication    2. History of gastroesophageal reflux (GERD)    3. History of colon polyps    4. Current use of anticoagulant therapy           Plan:   All diagnoses and orders for this visit:    Ulcerative pancolitis without complication & History of colon polyps   - Continue Entyvio 300 mg every 4 weeks   - Continue Questran 4 gm once daily   - Continue OTC Imodium PRN   -  Recommended increase fiber in diet, especially soluble fiber since this can help bulk up the stool consistency and may help to slow down how fast the stool goes through the colon and can prevent diarrhea    - Recommend FODMAP diet   - Next surveillance colonoscopy due 8/2025    History of gastroesophageal reflux (GERD)   - Continue Famotidine 40 mg once daily  - Recommend to avoid large meals, avoid eating within 3 hours of bedtime, elevate head of bed if nocturnal symptoms are present, smoking cessation (if current smoker), & weight loss (if overweight).   - Recommend minimize/avoid high-fat foods, chocolate, caffeine, citrus, alcohol, & tomato products.  - Advised to avoid/limit use of NSAID's, since they can cause GI upset, bleeding, and/or ulcers. If needed, take with food.      Current use of anticoagulant therapy    Recommend follow-up with Dr. Ervin in six months for continued evaluation and management.  If no improvement in symptoms or symptoms worsen, call/follow-up at clinic or go to ER.

## 2023-11-13 ENCOUNTER — INFUSION (OUTPATIENT)
Dept: INFUSION THERAPY | Facility: HOSPITAL | Age: 83
End: 2023-11-13
Attending: INTERNAL MEDICINE
Payer: OTHER GOVERNMENT

## 2023-11-13 VITALS
HEART RATE: 65 BPM | HEIGHT: 68 IN | SYSTOLIC BLOOD PRESSURE: 115 MMHG | WEIGHT: 175.06 LBS | BODY MASS INDEX: 26.53 KG/M2 | DIASTOLIC BLOOD PRESSURE: 54 MMHG | RESPIRATION RATE: 16 BRPM | OXYGEN SATURATION: 98 % | TEMPERATURE: 98 F

## 2023-11-13 DIAGNOSIS — K51.018 ULCERATIVE PANCOLITIS WITH OTHER COMPLICATION: Primary | ICD-10-CM

## 2023-11-13 PROCEDURE — 63600175 PHARM REV CODE 636 W HCPCS: Mod: JZ,JG,PN | Performed by: INTERNAL MEDICINE

## 2023-11-13 PROCEDURE — 96365 THER/PROPH/DIAG IV INF INIT: CPT | Mod: PN

## 2023-11-13 PROCEDURE — 25000003 PHARM REV CODE 250: Mod: PN | Performed by: INTERNAL MEDICINE

## 2023-11-13 RX ORDER — HEPARIN 100 UNIT/ML
500 SYRINGE INTRAVENOUS
Status: CANCELLED | OUTPATIENT
Start: 2023-12-11

## 2023-11-13 RX ORDER — METHYLPREDNISOLONE SOD SUCC 125 MG
40 VIAL (EA) INJECTION
Status: CANCELLED | OUTPATIENT
Start: 2023-12-11

## 2023-11-13 RX ORDER — ACETAMINOPHEN 325 MG/1
650 TABLET ORAL
Status: CANCELLED | OUTPATIENT
Start: 2023-12-11

## 2023-11-13 RX ORDER — EPINEPHRINE 1 MG/ML
0.3 INJECTION, SOLUTION, CONCENTRATE INTRAVENOUS
Status: CANCELLED | OUTPATIENT
Start: 2023-12-11

## 2023-11-13 RX ORDER — SODIUM CHLORIDE 0.9 % (FLUSH) 0.9 %
10 SYRINGE (ML) INJECTION
Status: DISCONTINUED | OUTPATIENT
Start: 2023-11-13 | End: 2023-11-13 | Stop reason: HOSPADM

## 2023-11-13 RX ORDER — DIPHENHYDRAMINE HYDROCHLORIDE 50 MG/ML
25 INJECTION INTRAMUSCULAR; INTRAVENOUS
Status: CANCELLED | OUTPATIENT
Start: 2023-12-11

## 2023-11-13 RX ORDER — SODIUM CHLORIDE 0.9 % (FLUSH) 0.9 %
10 SYRINGE (ML) INJECTION
Status: CANCELLED | OUTPATIENT
Start: 2023-12-11

## 2023-11-13 RX ORDER — IPRATROPIUM BROMIDE AND ALBUTEROL SULFATE 2.5; .5 MG/3ML; MG/3ML
3 SOLUTION RESPIRATORY (INHALATION)
Status: CANCELLED | OUTPATIENT
Start: 2023-12-11

## 2023-11-13 RX ADMIN — SODIUM CHLORIDE: 9 INJECTION, SOLUTION INTRAVENOUS at 08:11

## 2023-11-13 RX ADMIN — VEDOLIZUMAB 300 MG: 300 INJECTION, POWDER, LYOPHILIZED, FOR SOLUTION INTRAVENOUS at 09:11

## 2023-11-13 NOTE — PLAN OF CARE
Problem: Adult Inpatient Plan of Care  Goal: Plan of Care Review  Outcome: Ongoing, Progressing  Flowsheets (Taken 11/13/2023 6779)  Plan of Care Reviewed With: patient   Pt tolerated entyvio infusion well.  No adverse reaction noted.   IV flushed with NS and D/C per protocol.  Patient left clinic in no acute distress.

## 2023-11-13 NOTE — PLAN OF CARE
Problem: Fatigue  Goal: Improved Activity Tolerance  Outcome: Ongoing, Progressing  Intervention: Promote Improved Energy  Flowsheets (Taken 11/13/2023 0842)  Fatigue Management: activity schedule adjusted  Sleep/Rest Enhancement:   awakenings minimized   noise level reduced  Activity Management: Ambulated -L4     Problem: Adult Inpatient Plan of Care  Goal: Patient-Specific Goal (Individualized)  Flowsheets (Taken 11/13/2023 0842)  Anxieties, Fears or Concerns: none  Individualized Care Needs: recliner, warm blanket

## 2023-12-14 ENCOUNTER — INFUSION (OUTPATIENT)
Dept: INFUSION THERAPY | Facility: HOSPITAL | Age: 83
End: 2023-12-14
Attending: INTERNAL MEDICINE
Payer: OTHER GOVERNMENT

## 2023-12-14 VITALS
RESPIRATION RATE: 20 BRPM | WEIGHT: 173.06 LBS | HEIGHT: 68 IN | TEMPERATURE: 98 F | DIASTOLIC BLOOD PRESSURE: 61 MMHG | HEART RATE: 76 BPM | SYSTOLIC BLOOD PRESSURE: 105 MMHG | BODY MASS INDEX: 26.23 KG/M2 | OXYGEN SATURATION: 97 %

## 2023-12-14 DIAGNOSIS — K51.018 ULCERATIVE PANCOLITIS WITH OTHER COMPLICATION: Primary | ICD-10-CM

## 2023-12-14 PROCEDURE — 25000003 PHARM REV CODE 250: Mod: PN | Performed by: INTERNAL MEDICINE

## 2023-12-14 PROCEDURE — 63600175 PHARM REV CODE 636 W HCPCS: Mod: JZ,JG,PN | Performed by: INTERNAL MEDICINE

## 2023-12-14 PROCEDURE — 96365 THER/PROPH/DIAG IV INF INIT: CPT | Mod: PN

## 2023-12-14 RX ORDER — SODIUM CHLORIDE 0.9 % (FLUSH) 0.9 %
10 SYRINGE (ML) INJECTION
Status: CANCELLED | OUTPATIENT
Start: 2024-01-11

## 2023-12-14 RX ORDER — DIPHENHYDRAMINE HYDROCHLORIDE 50 MG/ML
25 INJECTION INTRAMUSCULAR; INTRAVENOUS
Status: CANCELLED | OUTPATIENT
Start: 2024-01-11

## 2023-12-14 RX ORDER — IPRATROPIUM BROMIDE AND ALBUTEROL SULFATE 2.5; .5 MG/3ML; MG/3ML
3 SOLUTION RESPIRATORY (INHALATION)
Status: CANCELLED | OUTPATIENT
Start: 2024-01-11

## 2023-12-14 RX ORDER — EPINEPHRINE 1 MG/ML
0.3 INJECTION, SOLUTION, CONCENTRATE INTRAVENOUS
Status: CANCELLED | OUTPATIENT
Start: 2024-01-11

## 2023-12-14 RX ORDER — HEPARIN 100 UNIT/ML
500 SYRINGE INTRAVENOUS
Status: CANCELLED | OUTPATIENT
Start: 2024-01-11

## 2023-12-14 RX ORDER — ACETAMINOPHEN 325 MG/1
650 TABLET ORAL
Status: CANCELLED | OUTPATIENT
Start: 2024-01-11

## 2023-12-14 RX ORDER — METHYLPREDNISOLONE SOD SUCC 125 MG
40 VIAL (EA) INJECTION
Status: CANCELLED | OUTPATIENT
Start: 2024-01-11

## 2023-12-14 RX ADMIN — SODIUM CHLORIDE: 9 INJECTION, SOLUTION INTRAVENOUS at 08:12

## 2023-12-14 RX ADMIN — VEDOLIZUMAB 300 MG: 300 INJECTION, POWDER, LYOPHILIZED, FOR SOLUTION INTRAVENOUS at 09:12

## 2024-01-16 ENCOUNTER — INFUSION (OUTPATIENT)
Dept: INFUSION THERAPY | Facility: HOSPITAL | Age: 84
End: 2024-01-16
Attending: INTERNAL MEDICINE
Payer: OTHER GOVERNMENT

## 2024-01-16 VITALS
RESPIRATION RATE: 14 BRPM | DIASTOLIC BLOOD PRESSURE: 63 MMHG | TEMPERATURE: 98 F | SYSTOLIC BLOOD PRESSURE: 112 MMHG | BODY MASS INDEX: 26.8 KG/M2 | WEIGHT: 176.81 LBS | HEART RATE: 69 BPM | HEIGHT: 68 IN

## 2024-01-16 DIAGNOSIS — K51.018 ULCERATIVE PANCOLITIS WITH OTHER COMPLICATION: Primary | ICD-10-CM

## 2024-01-16 LAB
ALBUMIN SERPL BCP-MCNC: 3.9 G/DL (ref 3.5–5.2)
ALP SERPL-CCNC: 65 U/L (ref 55–135)
ALT SERPL W/O P-5'-P-CCNC: 21 U/L (ref 10–44)
ANION GAP SERPL CALC-SCNC: 8 MMOL/L (ref 8–16)
AST SERPL-CCNC: 22 U/L (ref 10–40)
BASOPHILS # BLD AUTO: 0.04 K/UL (ref 0–0.2)
BASOPHILS NFR BLD: 0.4 % (ref 0–1.9)
BILIRUB SERPL-MCNC: 1 MG/DL (ref 0.1–1)
BUN SERPL-MCNC: 12 MG/DL (ref 8–23)
CALCIUM SERPL-MCNC: 9.3 MG/DL (ref 8.7–10.5)
CHLORIDE SERPL-SCNC: 104 MMOL/L (ref 95–110)
CO2 SERPL-SCNC: 28 MMOL/L (ref 23–29)
CREAT SERPL-MCNC: 1.1 MG/DL (ref 0.5–1.4)
DIFFERENTIAL METHOD BLD: ABNORMAL
EOSINOPHIL # BLD AUTO: 0.2 K/UL (ref 0–0.5)
EOSINOPHIL NFR BLD: 2.1 % (ref 0–8)
ERYTHROCYTE [DISTWIDTH] IN BLOOD BY AUTOMATED COUNT: 13.7 % (ref 11.5–14.5)
EST. GFR  (NO RACE VARIABLE): >60 ML/MIN/1.73 M^2
GLUCOSE SERPL-MCNC: 113 MG/DL (ref 70–110)
HCT VFR BLD AUTO: 35.1 % (ref 40–54)
HGB BLD-MCNC: 11.8 G/DL (ref 14–18)
IMM GRANULOCYTES # BLD AUTO: 0.03 K/UL (ref 0–0.04)
IMM GRANULOCYTES NFR BLD AUTO: 0.3 % (ref 0–0.5)
LYMPHOCYTES # BLD AUTO: 3 K/UL (ref 1–4.8)
LYMPHOCYTES NFR BLD: 31.5 % (ref 18–48)
MCH RBC QN AUTO: 29.6 PG (ref 27–31)
MCHC RBC AUTO-ENTMCNC: 33.6 G/DL (ref 32–36)
MCV RBC AUTO: 88 FL (ref 82–98)
MONOCYTES # BLD AUTO: 0.8 K/UL (ref 0.3–1)
MONOCYTES NFR BLD: 8.2 % (ref 4–15)
NEUTROPHILS # BLD AUTO: 5.5 K/UL (ref 1.8–7.7)
NEUTROPHILS NFR BLD: 57.5 % (ref 38–73)
NRBC BLD-RTO: 0 /100 WBC
PLATELET # BLD AUTO: 345 K/UL (ref 150–450)
PMV BLD AUTO: 10 FL (ref 9.2–12.9)
POTASSIUM SERPL-SCNC: 4.6 MMOL/L (ref 3.5–5.1)
PROT SERPL-MCNC: 7.3 G/DL (ref 6–8.4)
RBC # BLD AUTO: 3.98 M/UL (ref 4.6–6.2)
SODIUM SERPL-SCNC: 140 MMOL/L (ref 136–145)
WBC # BLD AUTO: 9.5 K/UL (ref 3.9–12.7)

## 2024-01-16 PROCEDURE — 63600175 PHARM REV CODE 636 W HCPCS: Mod: JZ,JG,PN | Performed by: INTERNAL MEDICINE

## 2024-01-16 PROCEDURE — 96365 THER/PROPH/DIAG IV INF INIT: CPT | Mod: PN

## 2024-01-16 PROCEDURE — 25000003 PHARM REV CODE 250: Mod: PN | Performed by: INTERNAL MEDICINE

## 2024-01-16 PROCEDURE — 85025 COMPLETE CBC W/AUTO DIFF WBC: CPT | Mod: PN | Performed by: INTERNAL MEDICINE

## 2024-01-16 PROCEDURE — 80053 COMPREHEN METABOLIC PANEL: CPT | Mod: PN | Performed by: INTERNAL MEDICINE

## 2024-01-16 RX ORDER — IPRATROPIUM BROMIDE AND ALBUTEROL SULFATE 2.5; .5 MG/3ML; MG/3ML
3 SOLUTION RESPIRATORY (INHALATION)
Status: CANCELLED | OUTPATIENT
Start: 2024-02-06

## 2024-01-16 RX ORDER — HEPARIN 100 UNIT/ML
500 SYRINGE INTRAVENOUS
Status: CANCELLED | OUTPATIENT
Start: 2024-02-06

## 2024-01-16 RX ORDER — DIPHENHYDRAMINE HYDROCHLORIDE 50 MG/ML
25 INJECTION INTRAMUSCULAR; INTRAVENOUS
Status: CANCELLED | OUTPATIENT
Start: 2024-02-06

## 2024-01-16 RX ORDER — EPINEPHRINE 1 MG/ML
0.3 INJECTION, SOLUTION, CONCENTRATE INTRAVENOUS
Status: CANCELLED | OUTPATIENT
Start: 2024-02-06

## 2024-01-16 RX ORDER — ACETAMINOPHEN 325 MG/1
650 TABLET ORAL
Status: CANCELLED | OUTPATIENT
Start: 2024-02-06

## 2024-01-16 RX ORDER — SODIUM CHLORIDE 0.9 % (FLUSH) 0.9 %
10 SYRINGE (ML) INJECTION
Status: DISCONTINUED | OUTPATIENT
Start: 2024-01-16 | End: 2024-01-16 | Stop reason: HOSPADM

## 2024-01-16 RX ORDER — SODIUM CHLORIDE 0.9 % (FLUSH) 0.9 %
10 SYRINGE (ML) INJECTION
Status: CANCELLED | OUTPATIENT
Start: 2024-02-06

## 2024-01-16 RX ORDER — METHYLPREDNISOLONE SOD SUCC 125 MG
40 VIAL (EA) INJECTION
Status: CANCELLED | OUTPATIENT
Start: 2024-02-06

## 2024-01-16 RX ADMIN — VEDOLIZUMAB 300 MG: 300 INJECTION, POWDER, LYOPHILIZED, FOR SOLUTION INTRAVENOUS at 10:01

## 2024-01-16 RX ADMIN — SODIUM CHLORIDE: 9 INJECTION, SOLUTION INTRAVENOUS at 09:01

## 2024-02-14 ENCOUNTER — INFUSION (OUTPATIENT)
Dept: INFUSION THERAPY | Facility: HOSPITAL | Age: 84
End: 2024-02-14
Attending: INTERNAL MEDICINE
Payer: OTHER GOVERNMENT

## 2024-02-14 VITALS
SYSTOLIC BLOOD PRESSURE: 127 MMHG | TEMPERATURE: 98 F | RESPIRATION RATE: 18 BRPM | DIASTOLIC BLOOD PRESSURE: 60 MMHG | HEART RATE: 64 BPM

## 2024-02-14 DIAGNOSIS — K51.018 ULCERATIVE PANCOLITIS WITH OTHER COMPLICATION: Primary | ICD-10-CM

## 2024-02-14 PROCEDURE — 96365 THER/PROPH/DIAG IV INF INIT: CPT | Mod: PN

## 2024-02-14 PROCEDURE — 63600175 PHARM REV CODE 636 W HCPCS: Mod: JZ,JG,PN | Performed by: INTERNAL MEDICINE

## 2024-02-14 PROCEDURE — 25000003 PHARM REV CODE 250: Mod: PN | Performed by: INTERNAL MEDICINE

## 2024-02-14 RX ORDER — IPRATROPIUM BROMIDE AND ALBUTEROL SULFATE 2.5; .5 MG/3ML; MG/3ML
3 SOLUTION RESPIRATORY (INHALATION)
Status: CANCELLED | OUTPATIENT
Start: 2024-03-13

## 2024-02-14 RX ORDER — HEPARIN 100 UNIT/ML
500 SYRINGE INTRAVENOUS
Status: CANCELLED | OUTPATIENT
Start: 2024-03-13

## 2024-02-14 RX ORDER — SODIUM CHLORIDE 0.9 % (FLUSH) 0.9 %
10 SYRINGE (ML) INJECTION
Status: DISCONTINUED | OUTPATIENT
Start: 2024-02-14 | End: 2024-02-14 | Stop reason: HOSPADM

## 2024-02-14 RX ORDER — HEPARIN 100 UNIT/ML
500 SYRINGE INTRAVENOUS
Status: DISCONTINUED | OUTPATIENT
Start: 2024-02-14 | End: 2024-02-14 | Stop reason: HOSPADM

## 2024-02-14 RX ORDER — METHYLPREDNISOLONE SOD SUCC 125 MG
40 VIAL (EA) INJECTION
Status: CANCELLED | OUTPATIENT
Start: 2024-03-13

## 2024-02-14 RX ORDER — DIPHENHYDRAMINE HYDROCHLORIDE 50 MG/ML
25 INJECTION INTRAMUSCULAR; INTRAVENOUS
Status: CANCELLED | OUTPATIENT
Start: 2024-03-13

## 2024-02-14 RX ORDER — ACETAMINOPHEN 325 MG/1
650 TABLET ORAL
Status: CANCELLED | OUTPATIENT
Start: 2024-03-13

## 2024-02-14 RX ORDER — EPINEPHRINE 1 MG/ML
0.3 INJECTION, SOLUTION, CONCENTRATE INTRAVENOUS
Status: CANCELLED | OUTPATIENT
Start: 2024-03-13

## 2024-02-14 RX ORDER — SODIUM CHLORIDE 0.9 % (FLUSH) 0.9 %
10 SYRINGE (ML) INJECTION
Status: CANCELLED | OUTPATIENT
Start: 2024-03-13

## 2024-02-14 RX ADMIN — SODIUM CHLORIDE: 9 INJECTION, SOLUTION INTRAVENOUS at 08:02

## 2024-02-14 RX ADMIN — VEDOLIZUMAB 300 MG: 300 INJECTION, POWDER, LYOPHILIZED, FOR SOLUTION INTRAVENOUS at 09:02

## 2024-03-11 ENCOUNTER — TELEPHONE (OUTPATIENT)
Dept: GASTROENTEROLOGY | Facility: CLINIC | Age: 84
End: 2024-03-11
Payer: OTHER GOVERNMENT

## 2024-03-11 DIAGNOSIS — R19.7 DIARRHEA, UNSPECIFIED TYPE: Primary | ICD-10-CM

## 2024-03-11 NOTE — TELEPHONE ENCOUNTER
Spoke with pt. Informed his appointment needed to be rescheduled due to Dr. Ervin not being available. Pt rescheduled appointment & stated he is having diarrhea x 1 week. Pt takes questran daily; taking imodium atleast twice a day (pt stated he takes 4-6 tablets per day) with no relief.

## 2024-03-13 ENCOUNTER — INFUSION (OUTPATIENT)
Dept: INFUSION THERAPY | Facility: HOSPITAL | Age: 84
End: 2024-03-13
Attending: INTERNAL MEDICINE
Payer: OTHER GOVERNMENT

## 2024-03-13 VITALS
TEMPERATURE: 98 F | SYSTOLIC BLOOD PRESSURE: 115 MMHG | WEIGHT: 177 LBS | RESPIRATION RATE: 16 BRPM | DIASTOLIC BLOOD PRESSURE: 59 MMHG | BODY MASS INDEX: 26.83 KG/M2 | HEIGHT: 68 IN | HEART RATE: 60 BPM

## 2024-03-13 DIAGNOSIS — K51.018 ULCERATIVE PANCOLITIS WITH OTHER COMPLICATION: Primary | ICD-10-CM

## 2024-03-13 PROCEDURE — 25000003 PHARM REV CODE 250: Mod: PN | Performed by: INTERNAL MEDICINE

## 2024-03-13 PROCEDURE — 96365 THER/PROPH/DIAG IV INF INIT: CPT | Mod: PN

## 2024-03-13 PROCEDURE — 63600175 PHARM REV CODE 636 W HCPCS: Mod: JZ,JG,PN | Performed by: INTERNAL MEDICINE

## 2024-03-13 RX ORDER — SODIUM CHLORIDE 0.9 % (FLUSH) 0.9 %
10 SYRINGE (ML) INJECTION
Status: CANCELLED | OUTPATIENT
Start: 2024-04-10

## 2024-03-13 RX ORDER — HEPARIN 100 UNIT/ML
500 SYRINGE INTRAVENOUS
Status: CANCELLED | OUTPATIENT
Start: 2024-04-10

## 2024-03-13 RX ORDER — IPRATROPIUM BROMIDE AND ALBUTEROL SULFATE 2.5; .5 MG/3ML; MG/3ML
3 SOLUTION RESPIRATORY (INHALATION)
Status: CANCELLED | OUTPATIENT
Start: 2024-04-10

## 2024-03-13 RX ORDER — EPINEPHRINE 1 MG/ML
0.3 INJECTION, SOLUTION, CONCENTRATE INTRAVENOUS
Status: CANCELLED | OUTPATIENT
Start: 2024-04-10

## 2024-03-13 RX ORDER — METHYLPREDNISOLONE SOD SUCC 125 MG
40 VIAL (EA) INJECTION
Status: CANCELLED | OUTPATIENT
Start: 2024-04-10

## 2024-03-13 RX ORDER — DIPHENHYDRAMINE HYDROCHLORIDE 50 MG/ML
25 INJECTION INTRAMUSCULAR; INTRAVENOUS
Status: CANCELLED | OUTPATIENT
Start: 2024-04-10

## 2024-03-13 RX ORDER — ACETAMINOPHEN 325 MG/1
650 TABLET ORAL
Status: CANCELLED | OUTPATIENT
Start: 2024-04-10

## 2024-03-13 RX ADMIN — VEDOLIZUMAB 300 MG: 300 INJECTION, POWDER, LYOPHILIZED, FOR SOLUTION INTRAVENOUS at 10:03

## 2024-03-13 RX ADMIN — SODIUM CHLORIDE: 9 INJECTION, SOLUTION INTRAVENOUS at 09:03

## 2024-03-13 NOTE — PLAN OF CARE
Pt arrived to clinic today for Entyvio infusion and tolerated well. No changes throughout therapy. Pt aware of follow up appointments and side effects of drugs. Discharged to home. NAD.

## 2024-03-18 ENCOUNTER — LAB VISIT (OUTPATIENT)
Dept: LAB | Facility: HOSPITAL | Age: 84
End: 2024-03-18
Attending: INTERNAL MEDICINE
Payer: OTHER GOVERNMENT

## 2024-03-18 DIAGNOSIS — R19.7 DIARRHEA, UNSPECIFIED TYPE: ICD-10-CM

## 2024-03-18 LAB
C DIFF GDH STL QL: NEGATIVE
C DIFF TOX A+B STL QL IA: NEGATIVE

## 2024-03-18 PROCEDURE — 87449 NOS EACH ORGANISM AG IA: CPT | Performed by: INTERNAL MEDICINE

## 2024-03-18 PROCEDURE — 87329 GIARDIA AG IA: CPT | Performed by: INTERNAL MEDICINE

## 2024-03-18 PROCEDURE — 87427 SHIGA-LIKE TOXIN AG IA: CPT | Performed by: INTERNAL MEDICINE

## 2024-03-18 PROCEDURE — 87449 NOS EACH ORGANISM AG IA: CPT | Mod: 91 | Performed by: INTERNAL MEDICINE

## 2024-03-18 PROCEDURE — 87046 STOOL CULTR AEROBIC BACT EA: CPT | Performed by: INTERNAL MEDICINE

## 2024-03-18 PROCEDURE — 87045 FECES CULTURE AEROBIC BACT: CPT | Performed by: INTERNAL MEDICINE

## 2024-03-18 PROCEDURE — 87209 SMEAR COMPLEX STAIN: CPT | Performed by: INTERNAL MEDICINE

## 2024-03-19 LAB
CRYPTOSP AG STL QL IA: NEGATIVE
E COLI SXT1 STL QL IA: NEGATIVE
E COLI SXT2 STL QL IA: NEGATIVE
G LAMBLIA AG STL QL IA: NEGATIVE

## 2024-03-20 LAB
BACTERIA STL CULT: NORMAL
O+P STL MICRO: NORMAL

## 2024-04-04 RX ORDER — ACETAMINOPHEN 325 MG/1
650 TABLET ORAL
Status: CANCELLED | OUTPATIENT
Start: 2024-04-10

## 2024-04-04 RX ORDER — SODIUM CHLORIDE 0.9 % (FLUSH) 0.9 %
10 SYRINGE (ML) INJECTION
Status: CANCELLED | OUTPATIENT
Start: 2024-04-10

## 2024-04-04 RX ORDER — DIPHENHYDRAMINE HYDROCHLORIDE 50 MG/ML
25 INJECTION INTRAMUSCULAR; INTRAVENOUS
Status: CANCELLED | OUTPATIENT
Start: 2024-04-10

## 2024-04-04 RX ORDER — METHYLPREDNISOLONE SOD SUCC 125 MG
40 VIAL (EA) INJECTION
Status: CANCELLED | OUTPATIENT
Start: 2024-04-10

## 2024-04-04 RX ORDER — HEPARIN 100 UNIT/ML
500 SYRINGE INTRAVENOUS
Status: CANCELLED | OUTPATIENT
Start: 2024-04-10

## 2024-04-04 RX ORDER — IPRATROPIUM BROMIDE AND ALBUTEROL SULFATE 2.5; .5 MG/3ML; MG/3ML
3 SOLUTION RESPIRATORY (INHALATION)
Status: CANCELLED | OUTPATIENT
Start: 2024-04-10

## 2024-04-04 RX ORDER — EPINEPHRINE 1 MG/ML
0.3 INJECTION, SOLUTION, CONCENTRATE INTRAVENOUS
Status: CANCELLED | OUTPATIENT
Start: 2024-04-10

## 2024-04-11 ENCOUNTER — INFUSION (OUTPATIENT)
Dept: INFUSION THERAPY | Facility: HOSPITAL | Age: 84
End: 2024-04-11
Attending: INTERNAL MEDICINE
Payer: OTHER GOVERNMENT

## 2024-04-11 VITALS
BODY MASS INDEX: 26.56 KG/M2 | WEIGHT: 175.25 LBS | RESPIRATION RATE: 16 BRPM | OXYGEN SATURATION: 98 % | SYSTOLIC BLOOD PRESSURE: 134 MMHG | HEIGHT: 68 IN | HEART RATE: 73 BPM | TEMPERATURE: 98 F | DIASTOLIC BLOOD PRESSURE: 74 MMHG

## 2024-04-11 DIAGNOSIS — K51.018 ULCERATIVE PANCOLITIS WITH OTHER COMPLICATION: Primary | ICD-10-CM

## 2024-04-11 LAB
ALBUMIN SERPL BCP-MCNC: 3.8 G/DL (ref 3.5–5.2)
ALP SERPL-CCNC: 56 U/L (ref 55–135)
ALT SERPL W/O P-5'-P-CCNC: 18 U/L (ref 10–44)
ANION GAP SERPL CALC-SCNC: 9 MMOL/L (ref 8–16)
AST SERPL-CCNC: 22 U/L (ref 10–40)
BASOPHILS # BLD AUTO: 0.03 K/UL (ref 0–0.2)
BASOPHILS NFR BLD: 0.2 % (ref 0–1.9)
BILIRUB SERPL-MCNC: 1.1 MG/DL (ref 0.1–1)
BUN SERPL-MCNC: 13 MG/DL (ref 8–23)
CALCIUM SERPL-MCNC: 9.6 MG/DL (ref 8.7–10.5)
CHLORIDE SERPL-SCNC: 103 MMOL/L (ref 95–110)
CO2 SERPL-SCNC: 27 MMOL/L (ref 23–29)
CREAT SERPL-MCNC: 1 MG/DL (ref 0.5–1.4)
DIFFERENTIAL METHOD BLD: ABNORMAL
EOSINOPHIL # BLD AUTO: 0.2 K/UL (ref 0–0.5)
EOSINOPHIL NFR BLD: 1.3 % (ref 0–8)
ERYTHROCYTE [DISTWIDTH] IN BLOOD BY AUTOMATED COUNT: 13.8 % (ref 11.5–14.5)
EST. GFR  (NO RACE VARIABLE): >60 ML/MIN/1.73 M^2
GLUCOSE SERPL-MCNC: 106 MG/DL (ref 70–110)
HCT VFR BLD AUTO: 36.4 % (ref 40–54)
HGB BLD-MCNC: 12.3 G/DL (ref 14–18)
IMM GRANULOCYTES # BLD AUTO: 0.07 K/UL (ref 0–0.04)
IMM GRANULOCYTES NFR BLD AUTO: 0.6 % (ref 0–0.5)
LYMPHOCYTES # BLD AUTO: 2.5 K/UL (ref 1–4.8)
LYMPHOCYTES NFR BLD: 20.1 % (ref 18–48)
MCH RBC QN AUTO: 29.9 PG (ref 27–31)
MCHC RBC AUTO-ENTMCNC: 33.8 G/DL (ref 32–36)
MCV RBC AUTO: 89 FL (ref 82–98)
MONOCYTES # BLD AUTO: 1 K/UL (ref 0.3–1)
MONOCYTES NFR BLD: 7.7 % (ref 4–15)
NEUTROPHILS # BLD AUTO: 8.9 K/UL (ref 1.8–7.7)
NEUTROPHILS NFR BLD: 70.1 % (ref 38–73)
NRBC BLD-RTO: 0 /100 WBC
PLATELET # BLD AUTO: 358 K/UL (ref 150–450)
PMV BLD AUTO: 10 FL (ref 9.2–12.9)
POTASSIUM SERPL-SCNC: 4.2 MMOL/L (ref 3.5–5.1)
PROT SERPL-MCNC: 7.5 G/DL (ref 6–8.4)
RBC # BLD AUTO: 4.11 M/UL (ref 4.6–6.2)
SODIUM SERPL-SCNC: 139 MMOL/L (ref 136–145)
WBC # BLD AUTO: 12.62 K/UL (ref 3.9–12.7)

## 2024-04-11 PROCEDURE — 85025 COMPLETE CBC W/AUTO DIFF WBC: CPT | Mod: PN | Performed by: INTERNAL MEDICINE

## 2024-04-11 PROCEDURE — 63600175 PHARM REV CODE 636 W HCPCS: Mod: JZ,JG,PN | Performed by: INTERNAL MEDICINE

## 2024-04-11 PROCEDURE — 80053 COMPREHEN METABOLIC PANEL: CPT | Mod: PN | Performed by: INTERNAL MEDICINE

## 2024-04-11 PROCEDURE — 25000003 PHARM REV CODE 250: Mod: PN | Performed by: INTERNAL MEDICINE

## 2024-04-11 PROCEDURE — 96365 THER/PROPH/DIAG IV INF INIT: CPT | Mod: PN

## 2024-04-11 RX ORDER — IPRATROPIUM BROMIDE AND ALBUTEROL SULFATE 2.5; .5 MG/3ML; MG/3ML
3 SOLUTION RESPIRATORY (INHALATION)
Status: CANCELLED | OUTPATIENT
Start: 2024-05-09

## 2024-04-11 RX ORDER — ACETAMINOPHEN 325 MG/1
650 TABLET ORAL
Status: CANCELLED | OUTPATIENT
Start: 2024-05-09

## 2024-04-11 RX ORDER — METHYLPREDNISOLONE SOD SUCC 125 MG
40 VIAL (EA) INJECTION
Status: CANCELLED | OUTPATIENT
Start: 2024-05-09

## 2024-04-11 RX ORDER — SODIUM CHLORIDE 0.9 % (FLUSH) 0.9 %
10 SYRINGE (ML) INJECTION
Status: CANCELLED | OUTPATIENT
Start: 2024-05-09

## 2024-04-11 RX ORDER — DIPHENHYDRAMINE HYDROCHLORIDE 50 MG/ML
25 INJECTION INTRAMUSCULAR; INTRAVENOUS
Status: CANCELLED | OUTPATIENT
Start: 2024-05-09

## 2024-04-11 RX ORDER — EPINEPHRINE 1 MG/ML
0.3 INJECTION, SOLUTION, CONCENTRATE INTRAVENOUS
Status: CANCELLED | OUTPATIENT
Start: 2024-05-09

## 2024-04-11 RX ORDER — SODIUM CHLORIDE 0.9 % (FLUSH) 0.9 %
10 SYRINGE (ML) INJECTION
Status: DISCONTINUED | OUTPATIENT
Start: 2024-04-11 | End: 2024-04-11 | Stop reason: HOSPADM

## 2024-04-11 RX ORDER — HEPARIN 100 UNIT/ML
500 SYRINGE INTRAVENOUS
Status: CANCELLED | OUTPATIENT
Start: 2024-05-09

## 2024-04-11 RX ADMIN — SODIUM CHLORIDE: 9 INJECTION, SOLUTION INTRAVENOUS at 10:04

## 2024-04-11 RX ADMIN — VEDOLIZUMAB 300 MG: 300 INJECTION, POWDER, LYOPHILIZED, FOR SOLUTION INTRAVENOUS at 10:04

## 2024-04-11 NOTE — PLAN OF CARE
Problem: Adult Inpatient Plan of Care  Goal: Patient-Specific Goal (Individualized)  Outcome: Ongoing, Progressing  Flowsheets (Taken 4/11/2024 1100)  Anxieties, Fears or Concerns: None  Individualized Care Needs: Recyeimi altamirano     Problem: Fatigue  Goal: Improved Activity Tolerance  Intervention: Promote Improved Energy  Flowsheets (Taken 4/11/2024 1100)  Fatigue Management:   activity schedule adjusted   paced activity encouraged   fatigue-related activity identified  Sleep/Rest Enhancement:   relaxation techniques promoted   regular sleep/rest pattern promoted  Activity Management:   Ambulated -L4   Ambulated in [place] - L4     Problem: Adult Inpatient Plan of Care  Goal: Plan of Care Review  Outcome: Ongoing, Progressing  Flowsheets (Taken 4/11/2024 1100)  Plan of Care Reviewed With: patient  Tolerated treatment with no known distress.  Ambulated from infusion center with steady gait.

## 2024-05-07 ENCOUNTER — OFFICE VISIT (OUTPATIENT)
Dept: GASTROENTEROLOGY | Facility: CLINIC | Age: 84
End: 2024-05-07
Payer: OTHER GOVERNMENT

## 2024-05-07 ENCOUNTER — LAB VISIT (OUTPATIENT)
Dept: LAB | Facility: HOSPITAL | Age: 84
End: 2024-05-07
Attending: INTERNAL MEDICINE
Payer: OTHER GOVERNMENT

## 2024-05-07 VITALS — HEIGHT: 68 IN | BODY MASS INDEX: 27.43 KG/M2 | RESPIRATION RATE: 17 BRPM | WEIGHT: 181 LBS

## 2024-05-07 DIAGNOSIS — K52.9 COLITIS: Primary | ICD-10-CM

## 2024-05-07 DIAGNOSIS — R19.7 DIARRHEA, UNSPECIFIED TYPE: ICD-10-CM

## 2024-05-07 DIAGNOSIS — K52.9 COLITIS: ICD-10-CM

## 2024-05-07 LAB — CRP SERPL-MCNC: 3.4 MG/L (ref 0–8.2)

## 2024-05-07 PROCEDURE — 36415 COLL VENOUS BLD VENIPUNCTURE: CPT | Mod: PO | Performed by: INTERNAL MEDICINE

## 2024-05-07 PROCEDURE — 99999 PR PBB SHADOW E&M-EST. PATIENT-LVL IV: CPT | Mod: PBBFAC,,, | Performed by: INTERNAL MEDICINE

## 2024-05-07 PROCEDURE — 99214 OFFICE O/P EST MOD 30 MIN: CPT | Mod: PBBFAC,PO | Performed by: INTERNAL MEDICINE

## 2024-05-07 PROCEDURE — 99213 OFFICE O/P EST LOW 20 MIN: CPT | Mod: S$PBB,,, | Performed by: INTERNAL MEDICINE

## 2024-05-07 PROCEDURE — 86140 C-REACTIVE PROTEIN: CPT | Performed by: INTERNAL MEDICINE

## 2024-05-08 ENCOUNTER — INFUSION (OUTPATIENT)
Dept: INFUSION THERAPY | Facility: HOSPITAL | Age: 84
End: 2024-05-08
Attending: INTERNAL MEDICINE
Payer: OTHER GOVERNMENT

## 2024-05-08 VITALS
DIASTOLIC BLOOD PRESSURE: 73 MMHG | OXYGEN SATURATION: 99 % | BODY MASS INDEX: 27.43 KG/M2 | WEIGHT: 181 LBS | RESPIRATION RATE: 18 BRPM | TEMPERATURE: 98 F | SYSTOLIC BLOOD PRESSURE: 105 MMHG | HEART RATE: 73 BPM | HEIGHT: 68 IN

## 2024-05-08 DIAGNOSIS — K51.018 ULCERATIVE PANCOLITIS WITH OTHER COMPLICATION: Primary | ICD-10-CM

## 2024-05-08 PROCEDURE — 25000003 PHARM REV CODE 250: Mod: PN | Performed by: INTERNAL MEDICINE

## 2024-05-08 PROCEDURE — 96365 THER/PROPH/DIAG IV INF INIT: CPT | Mod: PN

## 2024-05-08 PROCEDURE — 63600175 PHARM REV CODE 636 W HCPCS: Mod: JZ,JG,PN | Performed by: INTERNAL MEDICINE

## 2024-05-08 RX ORDER — METHYLPREDNISOLONE SOD SUCC 125 MG
40 VIAL (EA) INJECTION
Status: CANCELLED | OUTPATIENT
Start: 2024-06-05

## 2024-05-08 RX ORDER — IPRATROPIUM BROMIDE AND ALBUTEROL SULFATE 2.5; .5 MG/3ML; MG/3ML
3 SOLUTION RESPIRATORY (INHALATION)
Status: CANCELLED | OUTPATIENT
Start: 2024-06-05

## 2024-05-08 RX ORDER — HEPARIN 100 UNIT/ML
500 SYRINGE INTRAVENOUS
Status: CANCELLED | OUTPATIENT
Start: 2024-06-05

## 2024-05-08 RX ORDER — ACETAMINOPHEN 325 MG/1
650 TABLET ORAL
Status: CANCELLED | OUTPATIENT
Start: 2024-06-05

## 2024-05-08 RX ORDER — EPINEPHRINE 1 MG/ML
0.3 INJECTION, SOLUTION, CONCENTRATE INTRAVENOUS
Status: CANCELLED | OUTPATIENT
Start: 2024-06-05

## 2024-05-08 RX ORDER — HEPARIN 100 UNIT/ML
500 SYRINGE INTRAVENOUS
Status: DISCONTINUED | OUTPATIENT
Start: 2024-05-08 | End: 2024-05-08 | Stop reason: HOSPADM

## 2024-05-08 RX ORDER — SODIUM CHLORIDE 0.9 % (FLUSH) 0.9 %
10 SYRINGE (ML) INJECTION
Status: CANCELLED | OUTPATIENT
Start: 2024-06-05

## 2024-05-08 RX ORDER — DIPHENHYDRAMINE HYDROCHLORIDE 50 MG/ML
25 INJECTION INTRAMUSCULAR; INTRAVENOUS
Status: CANCELLED | OUTPATIENT
Start: 2024-06-05

## 2024-05-08 RX ORDER — SODIUM CHLORIDE 0.9 % (FLUSH) 0.9 %
10 SYRINGE (ML) INJECTION
Status: DISCONTINUED | OUTPATIENT
Start: 2024-05-08 | End: 2024-05-08 | Stop reason: HOSPADM

## 2024-05-08 RX ADMIN — SODIUM CHLORIDE: 9 INJECTION, SOLUTION INTRAVENOUS at 01:05

## 2024-05-08 RX ADMIN — VEDOLIZUMAB 300 MG: 300 INJECTION, POWDER, LYOPHILIZED, FOR SOLUTION INTRAVENOUS at 01:05

## 2024-05-08 NOTE — PROGRESS NOTES
Pt presents for evaluation chronic diarrhea, dating back many years. Typically 3 loose bowel movements in AM, then none rest of day. No bleeding. No constipation. No change with Entyvio. Started questran once daily, may have decreased frequency a little. Appetite and weight stable. No fever or jaundice. No signficant abd pain. Last C-scope 8/2023 reviewed. Recent stool evaluation, including c diff, negative.    REVIEW OF SYSTEMS:   Constitutional: Negative for fever, appetite change and unexpected weight change.  HENT: Negative for sore throat and trouble swallowing.  Eyes: Negative for visual disturbance.  Respiratory: Negative for chest tightness, shortness of breath and wheezing.  Cardiovascular: Negative for chest pain.  Gastrointestinal:  as per HPI    PHYSICAL EXAMINATION:                                                        GENERAL:  Comfortable, in no acute distress.      SKIN: Non-jaundiced.                             HEENT EXAM:  Nonicteric.  No adenopathy.  Oropharynx is clear.               NECK:  Supple.                                                               LUNGS:  Clear.                                                               CARDIAC:  Regular rate and rhythm.  S1, S2.  No murmur.                      ABDOMEN:  Soft, positive bowel sounds, nontender.  No hepatosplenomegaly or masses.  No rebound or guarding.                                             EXTREMITIES:  No edema.       IMP: Chronic diarrhea with hx IBD on entyvio - suspect underlying IBS.    REC: 1. Stool calprotectin             2. CRP            3. Increase questran to BID            4. Further rec's following above.

## 2024-05-09 ENCOUNTER — LAB VISIT (OUTPATIENT)
Dept: LAB | Facility: HOSPITAL | Age: 84
End: 2024-05-09
Attending: INTERNAL MEDICINE
Payer: OTHER GOVERNMENT

## 2024-05-09 DIAGNOSIS — K52.9 COLITIS: ICD-10-CM

## 2024-05-09 PROCEDURE — 83993 ASSAY FOR CALPROTECTIN FECAL: CPT | Performed by: INTERNAL MEDICINE

## 2024-05-13 LAB — CALPROTECTIN STL-MCNT: 828 MCG/G

## 2024-06-05 ENCOUNTER — INFUSION (OUTPATIENT)
Dept: INFUSION THERAPY | Facility: HOSPITAL | Age: 84
End: 2024-06-05
Attending: INTERNAL MEDICINE
Payer: OTHER GOVERNMENT

## 2024-06-05 VITALS
WEIGHT: 176.38 LBS | RESPIRATION RATE: 18 BRPM | DIASTOLIC BLOOD PRESSURE: 57 MMHG | HEIGHT: 68 IN | HEART RATE: 67 BPM | BODY MASS INDEX: 26.73 KG/M2 | TEMPERATURE: 98 F | SYSTOLIC BLOOD PRESSURE: 103 MMHG

## 2024-06-05 DIAGNOSIS — K51.018 ULCERATIVE PANCOLITIS WITH OTHER COMPLICATION: Primary | ICD-10-CM

## 2024-06-05 PROCEDURE — 96365 THER/PROPH/DIAG IV INF INIT: CPT | Mod: PN

## 2024-06-05 PROCEDURE — 63600175 PHARM REV CODE 636 W HCPCS: Mod: JZ,JG,PN | Performed by: INTERNAL MEDICINE

## 2024-06-05 PROCEDURE — 25000003 PHARM REV CODE 250: Mod: PN | Performed by: INTERNAL MEDICINE

## 2024-06-05 RX ORDER — EPINEPHRINE 1 MG/ML
0.3 INJECTION, SOLUTION, CONCENTRATE INTRAVENOUS
OUTPATIENT
Start: 2024-07-03

## 2024-06-05 RX ORDER — HEPARIN 100 UNIT/ML
500 SYRINGE INTRAVENOUS
OUTPATIENT
Start: 2024-07-03

## 2024-06-05 RX ORDER — SODIUM CHLORIDE 0.9 % (FLUSH) 0.9 %
10 SYRINGE (ML) INJECTION
Status: DISCONTINUED | OUTPATIENT
Start: 2024-06-05 | End: 2024-06-05 | Stop reason: HOSPADM

## 2024-06-05 RX ORDER — IPRATROPIUM BROMIDE AND ALBUTEROL SULFATE 2.5; .5 MG/3ML; MG/3ML
3 SOLUTION RESPIRATORY (INHALATION)
OUTPATIENT
Start: 2024-07-03

## 2024-06-05 RX ORDER — DIPHENHYDRAMINE HYDROCHLORIDE 50 MG/ML
25 INJECTION INTRAMUSCULAR; INTRAVENOUS
OUTPATIENT
Start: 2024-07-03

## 2024-06-05 RX ORDER — ACETAMINOPHEN 325 MG/1
650 TABLET ORAL
OUTPATIENT
Start: 2024-07-03

## 2024-06-05 RX ORDER — SODIUM CHLORIDE 0.9 % (FLUSH) 0.9 %
10 SYRINGE (ML) INJECTION
OUTPATIENT
Start: 2024-07-03

## 2024-06-05 RX ORDER — METHYLPREDNISOLONE SOD SUCC 125 MG
40 VIAL (EA) INJECTION
OUTPATIENT
Start: 2024-07-03

## 2024-06-05 RX ADMIN — SODIUM CHLORIDE: 9 INJECTION, SOLUTION INTRAVENOUS at 01:06

## 2024-06-05 RX ADMIN — VEDOLIZUMAB 300 MG: 300 INJECTION, POWDER, LYOPHILIZED, FOR SOLUTION INTRAVENOUS at 02:06

## 2024-06-05 NOTE — PLAN OF CARE
Problem: Adult Inpatient Plan of Care  Goal: Plan of Care Review  Outcome: Progressing  Flowsheets (Taken 6/5/2024 1345)  Plan of Care Reviewed With: patient  Goal: Patient-Specific Goal (Individualized)  Outcome: Progressing  Flowsheets (Taken 6/5/2024 1345)  Anxieties, Fears or Concerns: none  Individualized Care Needs: recliner, blanket, water, pillow     Problem: Fatigue  Goal: Improved Activity Tolerance  Outcome: Progressing  Intervention: Promote Improved Energy  Flowsheets (Taken 6/5/2024 1345)  Fatigue Management: paced activity encouraged  Sleep/Rest Enhancement:   natural light exposure provided   noise level reduced   relaxation techniques promoted  Activity Management:   Ambulated -L4   Up in chair - L3  Environmental Support: rest periods encouraged     Pt tolerated entyvio, VSS. Pt voiced no new complaints or concerns at this time. NAD noted. Pt d/c home.

## 2024-06-28 ENCOUNTER — TELEPHONE (OUTPATIENT)
Dept: INFUSION THERAPY | Facility: HOSPITAL | Age: 84
End: 2024-06-28
Payer: OTHER GOVERNMENT

## 2024-06-28 NOTE — TELEPHONE ENCOUNTER
----- Message from Cynthia Cook sent at 6/28/2024 12:46 PM CDT -----  Type: Needs Medical Advice  Who Called:  Patient   Symptoms (please be specific):    How long has patient had these symptoms:    Pharmacy name and phone #:    Best Call Back Number: [phone]  Additional Information: Patient is requesting a call back in regards to rescheduling his appt. on 7/3 to 7/1 or 7/2.

## 2024-07-02 ENCOUNTER — INFUSION (OUTPATIENT)
Dept: INFUSION THERAPY | Facility: HOSPITAL | Age: 84
End: 2024-07-02
Attending: INTERNAL MEDICINE
Payer: OTHER GOVERNMENT

## 2024-07-02 VITALS
OXYGEN SATURATION: 98 % | TEMPERATURE: 98 F | HEART RATE: 72 BPM | SYSTOLIC BLOOD PRESSURE: 116 MMHG | DIASTOLIC BLOOD PRESSURE: 68 MMHG | HEIGHT: 68 IN | WEIGHT: 177.25 LBS | BODY MASS INDEX: 26.86 KG/M2 | RESPIRATION RATE: 18 BRPM

## 2024-07-02 DIAGNOSIS — K51.018 ULCERATIVE PANCOLITIS WITH OTHER COMPLICATION: Primary | ICD-10-CM

## 2024-07-02 LAB
ALBUMIN SERPL BCP-MCNC: 3.5 G/DL (ref 3.5–5.2)
ALP SERPL-CCNC: 61 U/L (ref 55–135)
ALT SERPL W/O P-5'-P-CCNC: 14 U/L (ref 10–44)
ANION GAP SERPL CALC-SCNC: 13 MMOL/L (ref 8–16)
AST SERPL-CCNC: 20 U/L (ref 10–40)
BASOPHILS # BLD AUTO: 0.04 K/UL (ref 0–0.2)
BASOPHILS NFR BLD: 0.4 % (ref 0–1.9)
BILIRUB SERPL-MCNC: 0.7 MG/DL (ref 0.1–1)
BUN SERPL-MCNC: 12 MG/DL (ref 8–23)
CALCIUM SERPL-MCNC: 8.9 MG/DL (ref 8.7–10.5)
CHLORIDE SERPL-SCNC: 105 MMOL/L (ref 95–110)
CO2 SERPL-SCNC: 23 MMOL/L (ref 23–29)
CREAT SERPL-MCNC: 1.1 MG/DL (ref 0.5–1.4)
DIFFERENTIAL METHOD BLD: ABNORMAL
EOSINOPHIL # BLD AUTO: 0.1 K/UL (ref 0–0.5)
EOSINOPHIL NFR BLD: 1.5 % (ref 0–8)
ERYTHROCYTE [DISTWIDTH] IN BLOOD BY AUTOMATED COUNT: 13.2 % (ref 11.5–14.5)
EST. GFR  (NO RACE VARIABLE): >60 ML/MIN/1.73 M^2
GLUCOSE SERPL-MCNC: 184 MG/DL (ref 70–110)
HCT VFR BLD AUTO: 32.9 % (ref 40–54)
HGB BLD-MCNC: 11 G/DL (ref 14–18)
IMM GRANULOCYTES # BLD AUTO: 0.03 K/UL (ref 0–0.04)
IMM GRANULOCYTES NFR BLD AUTO: 0.3 % (ref 0–0.5)
LYMPHOCYTES # BLD AUTO: 2.6 K/UL (ref 1–4.8)
LYMPHOCYTES NFR BLD: 27.1 % (ref 18–48)
MCH RBC QN AUTO: 29.8 PG (ref 27–31)
MCHC RBC AUTO-ENTMCNC: 33.4 G/DL (ref 32–36)
MCV RBC AUTO: 89 FL (ref 82–98)
MONOCYTES # BLD AUTO: 0.7 K/UL (ref 0.3–1)
MONOCYTES NFR BLD: 7 % (ref 4–15)
NEUTROPHILS # BLD AUTO: 6.1 K/UL (ref 1.8–7.7)
NEUTROPHILS NFR BLD: 63.7 % (ref 38–73)
NRBC BLD-RTO: 0 /100 WBC
PLATELET # BLD AUTO: 336 K/UL (ref 150–450)
PMV BLD AUTO: 10.5 FL (ref 9.2–12.9)
POTASSIUM SERPL-SCNC: 3.8 MMOL/L (ref 3.5–5.1)
PROT SERPL-MCNC: 7 G/DL (ref 6–8.4)
RBC # BLD AUTO: 3.69 M/UL (ref 4.6–6.2)
SODIUM SERPL-SCNC: 141 MMOL/L (ref 136–145)
WBC # BLD AUTO: 9.62 K/UL (ref 3.9–12.7)

## 2024-07-02 PROCEDURE — 25000003 PHARM REV CODE 250: Mod: PN | Performed by: INTERNAL MEDICINE

## 2024-07-02 PROCEDURE — 96365 THER/PROPH/DIAG IV INF INIT: CPT | Mod: PN

## 2024-07-02 PROCEDURE — 63600175 PHARM REV CODE 636 W HCPCS: Mod: JZ,JG,PN | Performed by: INTERNAL MEDICINE

## 2024-07-02 PROCEDURE — 80053 COMPREHEN METABOLIC PANEL: CPT | Mod: PN | Performed by: INTERNAL MEDICINE

## 2024-07-02 PROCEDURE — 85025 COMPLETE CBC W/AUTO DIFF WBC: CPT | Mod: PN | Performed by: INTERNAL MEDICINE

## 2024-07-02 RX ORDER — ACETAMINOPHEN 325 MG/1
650 TABLET ORAL
OUTPATIENT
Start: 2024-07-30

## 2024-07-02 RX ORDER — DIPHENHYDRAMINE HYDROCHLORIDE 50 MG/ML
25 INJECTION INTRAMUSCULAR; INTRAVENOUS
OUTPATIENT
Start: 2024-07-30

## 2024-07-02 RX ORDER — IPRATROPIUM BROMIDE AND ALBUTEROL SULFATE 2.5; .5 MG/3ML; MG/3ML
3 SOLUTION RESPIRATORY (INHALATION)
OUTPATIENT
Start: 2024-07-30

## 2024-07-02 RX ORDER — SODIUM CHLORIDE 0.9 % (FLUSH) 0.9 %
10 SYRINGE (ML) INJECTION
OUTPATIENT
Start: 2024-07-30

## 2024-07-02 RX ORDER — HEPARIN 100 UNIT/ML
500 SYRINGE INTRAVENOUS
OUTPATIENT
Start: 2024-07-30

## 2024-07-02 RX ORDER — METHYLPREDNISOLONE SOD SUCC 125 MG
40 VIAL (EA) INJECTION
OUTPATIENT
Start: 2024-07-30

## 2024-07-02 RX ORDER — EPINEPHRINE 1 MG/ML
0.3 INJECTION, SOLUTION, CONCENTRATE INTRAVENOUS
OUTPATIENT
Start: 2024-07-30

## 2024-07-02 RX ADMIN — VEDOLIZUMAB 300 MG: 300 INJECTION, POWDER, LYOPHILIZED, FOR SOLUTION INTRAVENOUS at 02:07

## 2024-07-02 RX ADMIN — SODIUM CHLORIDE: 9 INJECTION, SOLUTION INTRAVENOUS at 01:07

## 2024-07-02 NOTE — NURSING
Pt arrived to infusion clinic for entyvio. Labs drawn and sent to lab. Pt tolerated well.Left unit NAD.VSS.

## 2024-07-02 NOTE — PLAN OF CARE
Problem: Adult Inpatient Plan of Care  Goal: Plan of Care Review  Outcome: Progressing  Goal: Patient-Specific Goal (Individualized)  Outcome: Progressing  Goal: Absence of Hospital-Acquired Illness or Injury  Outcome: Progressing  Goal: Optimal Comfort and Wellbeing  Description: Provided warm blanket, pillow behind knees, legs elevated  Outcome: Progressing  Goal: Readiness for Transition of Care  Outcome: Progressing     Problem: Fatigue  Goal: Improved Activity Tolerance  Description: Encouraged frequent rest periods   Outcome: Progressing

## 2024-07-15 ENCOUNTER — PATIENT MESSAGE (OUTPATIENT)
Dept: GASTROENTEROLOGY | Facility: CLINIC | Age: 84
End: 2024-07-15
Payer: OTHER GOVERNMENT

## 2024-07-15 DIAGNOSIS — K51.00 ULCERATIVE PANCOLITIS: ICD-10-CM

## 2024-07-15 DIAGNOSIS — K52.9 CHRONIC DIARRHEA: ICD-10-CM

## 2024-07-15 RX ORDER — CHOLESTYRAMINE 4 G/9G
4 POWDER, FOR SUSPENSION ORAL DAILY
Qty: 90 PACKET | Refills: 3 | Status: SHIPPED | OUTPATIENT
Start: 2024-07-15 | End: 2025-07-10

## 2024-07-16 ENCOUNTER — TELEPHONE (OUTPATIENT)
Dept: GASTROENTEROLOGY | Facility: CLINIC | Age: 84
End: 2024-07-16
Payer: OTHER GOVERNMENT

## 2024-07-16 NOTE — TELEPHONE ENCOUNTER
----- Message from Meliolu Charles sent at 7/16/2024 11:35 AM CDT -----  Type: Needs Medical Advice  Who Called:  pt  Best Call Back Number: [phone]  Additional Information: pt is calling in regards to needing to speak to the office about getting [facility] pre[facility]. Pt says it takes about 10 days to come in so he needs the pre authorization to be sent in by at least 07/25. Please call back and advise. Thanks!

## 2024-07-25 ENCOUNTER — TELEPHONE (OUTPATIENT)
Dept: GASTROENTEROLOGY | Facility: CLINIC | Age: 84
End: 2024-07-25
Payer: OTHER GOVERNMENT

## 2024-07-25 NOTE — TELEPHONE ENCOUNTER
----- Message from Ketty Ramsay MA sent at 7/25/2024 12:36 PM CDT -----  Good afternoon staff,     Your patient Mr Olson is coming in for his Entyvio on 7/31/2024 and I am in the process of scheduling his additional visits and noticed that the patient does not have a follow up scheduled with Dr Ervin or standing labs to schedule every 3 months. If you could place the labs I can schedule but please schedule the patient for follow up.     Thank you  TRAY Westbrook  [facility]  [phone]

## 2024-07-31 ENCOUNTER — INFUSION (OUTPATIENT)
Dept: INFUSION THERAPY | Facility: HOSPITAL | Age: 84
End: 2024-07-31
Attending: INTERNAL MEDICINE
Payer: OTHER GOVERNMENT

## 2024-07-31 VITALS
BODY MASS INDEX: 26.66 KG/M2 | DIASTOLIC BLOOD PRESSURE: 61 MMHG | HEART RATE: 74 BPM | OXYGEN SATURATION: 98 % | TEMPERATURE: 98 F | WEIGHT: 175.94 LBS | RESPIRATION RATE: 18 BRPM | SYSTOLIC BLOOD PRESSURE: 110 MMHG | HEIGHT: 68 IN

## 2024-07-31 DIAGNOSIS — K51.018 ULCERATIVE PANCOLITIS WITH OTHER COMPLICATION: Primary | ICD-10-CM

## 2024-07-31 PROCEDURE — 63600175 PHARM REV CODE 636 W HCPCS: Mod: JZ,JG,PN | Performed by: INTERNAL MEDICINE

## 2024-07-31 PROCEDURE — 96365 THER/PROPH/DIAG IV INF INIT: CPT | Mod: PN

## 2024-07-31 PROCEDURE — 25000003 PHARM REV CODE 250: Mod: PN | Performed by: INTERNAL MEDICINE

## 2024-07-31 RX ORDER — DIPHENHYDRAMINE HYDROCHLORIDE 50 MG/ML
25 INJECTION INTRAMUSCULAR; INTRAVENOUS
OUTPATIENT
Start: 2024-08-28

## 2024-07-31 RX ORDER — HEPARIN 100 UNIT/ML
500 SYRINGE INTRAVENOUS
Status: DISCONTINUED | OUTPATIENT
Start: 2024-07-31 | End: 2024-07-31 | Stop reason: HOSPADM

## 2024-07-31 RX ORDER — ACETAMINOPHEN 325 MG/1
650 TABLET ORAL
OUTPATIENT
Start: 2024-08-28

## 2024-07-31 RX ORDER — IPRATROPIUM BROMIDE AND ALBUTEROL SULFATE 2.5; .5 MG/3ML; MG/3ML
3 SOLUTION RESPIRATORY (INHALATION)
OUTPATIENT
Start: 2024-08-28

## 2024-07-31 RX ORDER — METHYLPREDNISOLONE SOD SUCC 125 MG
40 VIAL (EA) INJECTION
OUTPATIENT
Start: 2024-08-28

## 2024-07-31 RX ORDER — HEPARIN 100 UNIT/ML
500 SYRINGE INTRAVENOUS
OUTPATIENT
Start: 2024-08-28

## 2024-07-31 RX ORDER — SODIUM CHLORIDE 0.9 % (FLUSH) 0.9 %
10 SYRINGE (ML) INJECTION
Status: DISCONTINUED | OUTPATIENT
Start: 2024-07-31 | End: 2024-07-31 | Stop reason: HOSPADM

## 2024-07-31 RX ORDER — EPINEPHRINE 1 MG/ML
0.3 INJECTION, SOLUTION, CONCENTRATE INTRAVENOUS
OUTPATIENT
Start: 2024-08-28

## 2024-07-31 RX ORDER — SODIUM CHLORIDE 0.9 % (FLUSH) 0.9 %
10 SYRINGE (ML) INJECTION
OUTPATIENT
Start: 2024-08-28

## 2024-07-31 RX ADMIN — VEDOLIZUMAB 300 MG: 300 INJECTION, POWDER, LYOPHILIZED, FOR SOLUTION INTRAVENOUS at 01:07

## 2024-07-31 RX ADMIN — SODIUM CHLORIDE: 9 INJECTION, SOLUTION INTRAVENOUS at 01:07

## 2024-07-31 NOTE — PLAN OF CARE
Problem: Adult Inpatient Plan of Care  Goal: Plan of Care Review  Outcome: Progressing  Goal: Patient-Specific Goal (Individualized)  Outcome: Progressing  Flowsheets (Taken 7/31/2024 1355)  Anxieties, Fears or Concerns: PIV start  Individualized Care Needs: pillow, beverage  Goal: Absence of Hospital-Acquired Illness or Injury  Outcome: Progressing     Problem: Fluid Volume Deficit  Goal: Fluid Balance  Outcome: Progressing  Intervention: Monitor and Manage Hypovolemia  Flowsheets (Taken 7/31/2024 1754)  Fluid/Electrolyte Management:   other (see comments)   fluids provided   Pt here for Entyvio infusion, tolerated infusion well. Discharged home.

## 2024-08-21 DIAGNOSIS — K51.00 ULCERATIVE PANCOLITIS: ICD-10-CM

## 2024-08-21 DIAGNOSIS — K52.9 CHRONIC DIARRHEA: ICD-10-CM

## 2024-08-21 RX ORDER — CHOLESTYRAMINE 4 G/9G
4 POWDER, FOR SUSPENSION ORAL DAILY
Qty: 90 PACKET | Refills: 3 | Status: SHIPPED | OUTPATIENT
Start: 2024-08-21 | End: 2025-08-16

## 2024-08-21 NOTE — TELEPHONE ENCOUNTER
----- Message from Jane Portillo MA sent at 8/21/2024  3:16 PM CDT -----  Regarding: refill  Contact: pt  Type:  RX Refill Request    Who Called:  pt     Refill or New Rx:  refill     RX Name and Strength:  cholestyramine (QUESTRAN) 4 gram packet    How is the patient currently taking it? 2x pack   Is this a 30 day or 90 day RX: 30     Preferred Pharmacy with phone number:    [facility] - [facility] [address]  [address] [facility] [address]  Phone: [phone] Fax: [phone]    Local or Mail Order:  local   Ordering Provider:  Arcadio Mcdowell Call Back Number:  [phone] (home)     Additional Information:  please call to advise. Thanks!

## 2024-08-28 ENCOUNTER — INFUSION (OUTPATIENT)
Dept: INFUSION THERAPY | Facility: HOSPITAL | Age: 84
End: 2024-08-28
Attending: INTERNAL MEDICINE
Payer: MEDICARE

## 2024-08-28 VITALS
RESPIRATION RATE: 14 BRPM | DIASTOLIC BLOOD PRESSURE: 62 MMHG | OXYGEN SATURATION: 99 % | WEIGHT: 175.06 LBS | HEART RATE: 64 BPM | SYSTOLIC BLOOD PRESSURE: 110 MMHG | HEIGHT: 68 IN | TEMPERATURE: 98 F | BODY MASS INDEX: 26.53 KG/M2

## 2024-08-28 DIAGNOSIS — K51.018 ULCERATIVE PANCOLITIS WITH OTHER COMPLICATION: Primary | ICD-10-CM

## 2024-08-28 PROCEDURE — 25000003 PHARM REV CODE 250: Mod: PN | Performed by: INTERNAL MEDICINE

## 2024-08-28 PROCEDURE — 63600175 PHARM REV CODE 636 W HCPCS: Mod: JZ,JG,PN | Performed by: INTERNAL MEDICINE

## 2024-08-28 PROCEDURE — 96365 THER/PROPH/DIAG IV INF INIT: CPT | Mod: PN

## 2024-08-28 RX ORDER — EPINEPHRINE 1 MG/ML
0.3 INJECTION, SOLUTION, CONCENTRATE INTRAVENOUS
Status: DISPENSED | OUTPATIENT
Start: 2024-08-28 | End: 2024-08-28

## 2024-08-28 RX ORDER — HEPARIN 100 UNIT/ML
500 SYRINGE INTRAVENOUS
Status: DISCONTINUED | OUTPATIENT
Start: 2024-08-28 | End: 2024-08-28 | Stop reason: HOSPADM

## 2024-08-28 RX ORDER — DIPHENHYDRAMINE HYDROCHLORIDE 50 MG/ML
25 INJECTION INTRAMUSCULAR; INTRAVENOUS
Status: ACTIVE | OUTPATIENT
Start: 2024-08-28 | End: 2024-08-28

## 2024-08-28 RX ORDER — METHYLPREDNISOLONE SOD SUCC 125 MG
40 VIAL (EA) INJECTION
Status: ACTIVE | OUTPATIENT
Start: 2024-08-28 | End: 2024-08-28

## 2024-08-28 RX ORDER — ACETAMINOPHEN 325 MG/1
650 TABLET ORAL
OUTPATIENT
Start: 2024-09-25

## 2024-08-28 RX ORDER — EPINEPHRINE 1 MG/ML
0.3 INJECTION, SOLUTION, CONCENTRATE INTRAVENOUS
OUTPATIENT
Start: 2024-09-25

## 2024-08-28 RX ORDER — ACETAMINOPHEN 325 MG/1
650 TABLET ORAL
Status: ACTIVE | OUTPATIENT
Start: 2024-08-28 | End: 2024-08-28

## 2024-08-28 RX ORDER — METHYLPREDNISOLONE SOD SUCC 125 MG
40 VIAL (EA) INJECTION
OUTPATIENT
Start: 2024-09-25

## 2024-08-28 RX ORDER — SODIUM CHLORIDE 0.9 % (FLUSH) 0.9 %
10 SYRINGE (ML) INJECTION
Status: DISCONTINUED | OUTPATIENT
Start: 2024-08-28 | End: 2024-08-28 | Stop reason: HOSPADM

## 2024-08-28 RX ORDER — DIPHENHYDRAMINE HYDROCHLORIDE 50 MG/ML
25 INJECTION INTRAMUSCULAR; INTRAVENOUS
OUTPATIENT
Start: 2024-09-25

## 2024-08-28 RX ORDER — HEPARIN 100 UNIT/ML
500 SYRINGE INTRAVENOUS
OUTPATIENT
Start: 2024-09-25

## 2024-08-28 RX ORDER — SODIUM CHLORIDE 0.9 % (FLUSH) 0.9 %
10 SYRINGE (ML) INJECTION
OUTPATIENT
Start: 2024-09-25

## 2024-08-28 RX ORDER — IPRATROPIUM BROMIDE AND ALBUTEROL SULFATE 2.5; .5 MG/3ML; MG/3ML
3 SOLUTION RESPIRATORY (INHALATION)
OUTPATIENT
Start: 2024-09-25

## 2024-08-28 RX ORDER — IPRATROPIUM BROMIDE AND ALBUTEROL SULFATE 2.5; .5 MG/3ML; MG/3ML
3 SOLUTION RESPIRATORY (INHALATION)
Status: DISCONTINUED | OUTPATIENT
Start: 2024-08-28 | End: 2024-08-28

## 2024-08-28 RX ADMIN — VEDOLIZUMAB 300 MG: 300 INJECTION, POWDER, LYOPHILIZED, FOR SOLUTION INTRAVENOUS at 02:08

## 2024-08-28 RX ADMIN — SODIUM CHLORIDE: 9 INJECTION, SOLUTION INTRAVENOUS at 01:08

## 2024-08-28 NOTE — PLAN OF CARE
Problem: Adult Inpatient Plan of Care  Goal: Plan of Care Review  Outcome: Progressing  Flowsheets (Taken 8/28/2024 1350)  Plan of Care Reviewed With: patient  Goal: Patient-Specific Goal (Individualized)  Outcome: Progressing  Flowsheets (Taken 8/28/2024 1358)  Anxieties, Fears or Concerns: none today  Individualized Care Needs: pillow, blanket, conversation     Problem: Fatigue  Goal: Improved Activity Tolerance  Description: Encouraged frequent rest periods   Outcome: Progressing  Intervention: Promote Improved Energy  Flowsheets (Taken 8/28/2024 1358)  Activity Management: Up in chair - L3   Pt tolerated Entyvio  infusion well.  No adverse reaction noted.   IV flushed with NS and D/C per protocol.  Patient left clinic in no acute distress.

## 2024-09-06 ENCOUNTER — PATIENT MESSAGE (OUTPATIENT)
Dept: GASTROENTEROLOGY | Facility: CLINIC | Age: 84
End: 2024-09-06
Payer: MEDICARE

## 2024-09-06 DIAGNOSIS — K51.00 ULCERATIVE PANCOLITIS WITHOUT COMPLICATION: Primary | ICD-10-CM

## 2024-09-06 RX ORDER — CHOLESTYRAMINE 4 G/9G
4 POWDER, FOR SUSPENSION ORAL 2 TIMES DAILY
Qty: 180 PACKET | Refills: 3 | Status: SHIPPED | OUTPATIENT
Start: 2024-09-06 | End: 2025-09-06

## 2024-09-25 ENCOUNTER — INFUSION (OUTPATIENT)
Dept: INFUSION THERAPY | Facility: HOSPITAL | Age: 84
End: 2024-09-25
Attending: INTERNAL MEDICINE
Payer: MEDICARE

## 2024-09-25 VITALS
RESPIRATION RATE: 16 BRPM | DIASTOLIC BLOOD PRESSURE: 66 MMHG | TEMPERATURE: 98 F | HEIGHT: 68 IN | HEART RATE: 65 BPM | WEIGHT: 176.56 LBS | BODY MASS INDEX: 26.76 KG/M2 | SYSTOLIC BLOOD PRESSURE: 113 MMHG | OXYGEN SATURATION: 97 %

## 2024-09-25 DIAGNOSIS — K51.018 ULCERATIVE PANCOLITIS WITH OTHER COMPLICATION: Primary | ICD-10-CM

## 2024-09-25 PROCEDURE — 96365 THER/PROPH/DIAG IV INF INIT: CPT | Mod: PN

## 2024-09-25 PROCEDURE — 63600175 PHARM REV CODE 636 W HCPCS: Mod: JZ,JG,PN | Performed by: INTERNAL MEDICINE

## 2024-09-25 PROCEDURE — 25000003 PHARM REV CODE 250: Mod: PN | Performed by: INTERNAL MEDICINE

## 2024-09-25 RX ORDER — EPINEPHRINE 1 MG/ML
0.3 INJECTION, SOLUTION, CONCENTRATE INTRAVENOUS
OUTPATIENT
Start: 2024-10-23

## 2024-09-25 RX ORDER — METHYLPREDNISOLONE SOD SUCC 125 MG
40 VIAL (EA) INJECTION
OUTPATIENT
Start: 2024-10-23

## 2024-09-25 RX ORDER — HEPARIN 100 UNIT/ML
500 SYRINGE INTRAVENOUS
OUTPATIENT
Start: 2024-10-23

## 2024-09-25 RX ORDER — ACETAMINOPHEN 325 MG/1
650 TABLET ORAL
OUTPATIENT
Start: 2024-10-23

## 2024-09-25 RX ORDER — IPRATROPIUM BROMIDE AND ALBUTEROL SULFATE 2.5; .5 MG/3ML; MG/3ML
3 SOLUTION RESPIRATORY (INHALATION)
OUTPATIENT
Start: 2024-10-23

## 2024-09-25 RX ORDER — SODIUM CHLORIDE 0.9 % (FLUSH) 0.9 %
10 SYRINGE (ML) INJECTION
OUTPATIENT
Start: 2024-10-23

## 2024-09-25 RX ORDER — SODIUM CHLORIDE 0.9 % (FLUSH) 0.9 %
10 SYRINGE (ML) INJECTION
Status: DISCONTINUED | OUTPATIENT
Start: 2024-09-25 | End: 2024-09-25 | Stop reason: HOSPADM

## 2024-09-25 RX ORDER — DIPHENHYDRAMINE HYDROCHLORIDE 50 MG/ML
25 INJECTION INTRAMUSCULAR; INTRAVENOUS
OUTPATIENT
Start: 2024-10-23

## 2024-09-25 RX ADMIN — VEDOLIZUMAB 300 MG: 300 INJECTION, POWDER, LYOPHILIZED, FOR SOLUTION INTRAVENOUS at 02:09

## 2024-09-25 RX ADMIN — SODIUM CHLORIDE: 9 INJECTION, SOLUTION INTRAVENOUS at 02:09

## 2024-09-25 NOTE — PLAN OF CARE
Problem: Adult Inpatient Plan of Care  Goal: Patient-Specific Goal (Individualized)  Outcome: Progressing  Flowsheets (Taken 9/25/2024 1507)  Anxieties, Fears or Concerns: None  Individualized Care Needs: Recliner     Problem: Fatigue  Goal: Improved Activity Tolerance  Description: Encouraged frequent rest periods   Intervention: Promote Improved Energy  Flowsheets (Taken 9/25/2024 1507)  Fatigue Management:   paced activity encouraged   activity schedule adjusted  Sleep/Rest Enhancement: relaxation techniques promoted  Activity Management:   Ambulated -L4   Ambulated in [place] - L4  Environmental Support:   calm environment promoted   environmental consistency promoted     Problem: Adult Inpatient Plan of Care  Goal: Plan of Care Review  Outcome: Progressing  Flowsheets (Taken 9/25/2024 1507)  Plan of Care Reviewed With: patient  Tolerated treatment with no known distress.  Ambulated from infusion center with steady gait.

## 2024-10-07 ENCOUNTER — PATIENT MESSAGE (OUTPATIENT)
Dept: RESEARCH | Facility: HOSPITAL | Age: 84
End: 2024-10-07
Payer: MEDICARE

## 2024-10-15 ENCOUNTER — TELEPHONE (OUTPATIENT)
Dept: GASTROENTEROLOGY | Facility: CLINIC | Age: 84
End: 2024-10-15
Payer: MEDICARE

## 2024-10-15 NOTE — TELEPHONE ENCOUNTER
----- Message from Destiney sent at 10/15/2024  4:42 PM CDT -----  Type:  Patient Returning Call    Who Called:PT    Who Left Message for Patient:Office    Does the patient know what this is regarding?:no    Would the patient rather a call back or a response via [facility]? call    Best Call Back Number:[phone]       Additional Information:  Please call back to advise. Thank you!

## 2024-10-21 ENCOUNTER — OFFICE VISIT (OUTPATIENT)
Dept: GASTROENTEROLOGY | Facility: CLINIC | Age: 84
End: 2024-10-21
Payer: MEDICARE

## 2024-10-21 VITALS — HEIGHT: 68 IN | WEIGHT: 173.75 LBS | BODY MASS INDEX: 26.33 KG/M2

## 2024-10-21 DIAGNOSIS — K51.018 ULCERATIVE PANCOLITIS WITH OTHER COMPLICATION: Primary | ICD-10-CM

## 2024-10-21 DIAGNOSIS — R10.30 LOWER ABDOMINAL PAIN: ICD-10-CM

## 2024-10-21 DIAGNOSIS — R14.0 ABDOMINAL BLOATING: ICD-10-CM

## 2024-10-21 DIAGNOSIS — R10.9 ABDOMINAL CRAMPING: ICD-10-CM

## 2024-10-21 DIAGNOSIS — Z87.19 HISTORY OF GASTROESOPHAGEAL REFLUX (GERD): ICD-10-CM

## 2024-10-21 DIAGNOSIS — K52.9 CHRONIC DIARRHEA: ICD-10-CM

## 2024-10-21 PROCEDURE — 99214 OFFICE O/P EST MOD 30 MIN: CPT | Mod: PBBFAC,PO

## 2024-10-21 PROCEDURE — 99999 PR PBB SHADOW E&M-EST. PATIENT-LVL IV: CPT | Mod: PBBFAC,,,

## 2024-10-21 PROCEDURE — 99214 OFFICE O/P EST MOD 30 MIN: CPT | Mod: S$PBB,,,

## 2024-10-21 RX ORDER — CHOLESTYRAMINE 4 G/9G
4 POWDER, FOR SUSPENSION ORAL 2 TIMES DAILY
COMMUNITY

## 2024-10-21 NOTE — PROGRESS NOTES
Subjective:       Patient ID: Alfie Olson is a 84 y.o. male Body mass index is 26.41 kg/m².    Chief Complaint: Ulcerative Colitis and Diarrhea    This patient is new to me.  Established patient of Dr. Ivory, Dr. Ervin, and Helen Davis, [facility].     Patient following up in clinic with hx of ulcerative colitis. Colonoscopy 9/1/22 with findings of normal TI, mild inflammation throughout the entire colon. Path with mild chronic active colitis throughout the colon with rare microscopic granulomas and rare non-necrotizing micro granulomas which suspected to be related to potential Crohn's disease of colon. He is currently compliant on Entyvio 300 mg every 4 weeks monotherapy; next infusion 10/23/2024.  He has had chronic diarrhea with lower abdominal cramping since 2016 with temporary relief taking Imodium b.i.d. & cholestyramine b.i.d. (not as effective as imodium); denies nighttime symptoms. Denies hematochezia.    GI Problem  The primary symptoms include fatigue, abdominal pain and diarrhea. Primary symptoms do not include fever, weight loss, nausea, vomiting, melena, hematemesis, jaundice, hematochezia or dysuria.   The abdominal pain began more than 2 days ago. The abdominal pain has been gradually worsening since its onset. The abdominal pain is located in the LLQ, suprapubic region and RLQ. The abdominal pain does not radiate. The severity of the abdominal pain is 0/10 (Denies pain currently; reports intermittent abdominal cramping prior to BMs and spontaneous; lasts minutes). The abdominal pain is relieved by bowel movements and passing flatus.   The diarrhea began more than 1 week ago (Chronic started in 2016 and has worsened; denies waking at night with diarrhea). The diarrhea is watery (Rated stool 4 on [place] scale after taking Imodium, but typically rated 7 on [place] scale; currently taking Imodium b.i.d. and Questran 4 g b.i.d. with short term relief). Daily occurrences: Has between 5 and 6 BMs  daily with fecal urgency without using Imodium; when using Imodium has 4 BMs daily. Risk factors: Thought he was prescribed antibiotics 08/12/2024 for tendinitis of right knee, but after reviewing chart pt received Robaxin and indomethacin; denies suspect food, recent foreign travel, or new medications.   The illness is also significant for bloating. The illness does not include chills, dysphagia, odynophagia or constipation. Associated symptoms comments: Trouble swallowing recently food stuck in chest; hanging up; - trouble chewing; baking soda. Significant associated medical issues include inflammatory bowel disease (hx of ulcerative colitis diagnosed in 1961) and GERD (well-controlled taking Pepcid 40 mg). Associated medical issues do not include gallstones, liver disease, alcohol abuse, PUD, gastric bypass, bowel resection, irritable bowel syndrome, hemorrhoids or diverticulitis (diverticulosis). Associated medical issues comments: History fatty pancreas; denies family history of colon cancer but does report family history of UC in son.     IBD History:   IBD disease: Ulcerative colitis (suspect to be really Crohn's disease with granulomas on biopsies of colon)  Disease location: colonic  Disease behavior: Inflammatory  Current therapy:  Entyvio 300 mg every 4 weeks monotherapy  Prior therapy: Sulfasalazine, mesalamine, steroids  Surgeries: None  Complications:None  Extraintestinal manifestations: None    Review of Systems   Constitutional:  Positive for activity change, appetite change (decreased) and fatigue. Negative for chills, diaphoresis, fever, unexpected weight change and weight loss.   HENT:  Negative for sore throat and trouble swallowing.    Respiratory:  Negative for cough, choking and shortness of breath.    Cardiovascular:  Negative for chest pain.   Gastrointestinal:  Positive for abdominal pain, bloating and diarrhea. Negative for abdominal distention, anal bleeding, blood in stool, constipation,  dysphagia, hematemesis, hematochezia, jaundice, melena, nausea, rectal pain and vomiting.   Genitourinary:  Negative for dysuria.       No LMP for male patient.  Past Medical History:   Diagnosis Date    Arthritis     B12 deficiency     Cholelithiasis     Chronic back pain     Chronic diarrhea     Colon polyp     Diverticulosis     GERD (gastroesophageal reflux disease)     Glaucoma     left eye    Hyperlipemia     JUAN DIEGO (obstructive sleep apnea)     denies CPAP use    Skin cancer     Ulcerative colitis 1961    Vitamin D deficiency      Past Surgical History:   Procedure Laterality Date    BACK SURGERY      CATARACT EXTRACTION W/ INTRAOCULAR LENS IMPLANT Right     COLONOSCOPY  2014    COLONOSCOPY  03/25/2015    diverticulosis, otherwise normal findings, biopsies taken from 4 quadrants- see media section for biopsy report, repeat in 1 year    COLONOSCOPY N/A 6/1/2016    Procedure: COLONOSCOPY;  Surgeon: Ravindra Ervin MD;  Location: [facility];  Service: Endoscopy;  Laterality: N/A;    COLONOSCOPY N/A 9/1/2022    Procedure: COLONOSCOPY;  Surgeon: Lencho Ivory MD;  Location: [facility];  Service: Endoscopy;  Laterality: N/A;    COLONOSCOPY N/A 8/18/2023    Procedure: COLONOSCOPY;  Surgeon: Lencho Ivory MD;  Location: [facility];  Service: Endoscopy;  Laterality: N/A;    COLONOSCOPY N/A 7/25/2024    Procedure: COLONOSCOPY;  Surgeon: Ravindra Ervin MD;  Location: [facility];  Service: Gastroenterology;  Laterality: N/A;    HERNIA REPAIR      rt inguinal    LUMBAR FUSION      L3,4,5    MULTIPLE TOOTH EXTRACTIONS      pain pump placement  04/08/2010    dilaudid/baclofen    SKIN BIOPSY      SKIN CANCER EXCISION      UPPER GASTROINTESTINAL ENDOSCOPY  10/26/2009    hiatal hernia     Family History   Problem Relation Name Age of Onset    Ulcerative colitis Son      Colon cancer Neg Hx      Colon polyps Neg Hx       Social History     Tobacco Use    Smoking status: Never    Smokeless tobacco: Never    Substance Use Topics    Alcohol use: No    Drug use: No     Wt Readings from Last 10 Encounters:   10/21/24 78.8 kg (173 lb 11.6 oz)   09/25/24 80.1 kg (176 lb 9.4 oz)   08/28/24 79.4 kg (175 lb 0.7 oz)   07/31/24 79.8 kg (175 lb 14.8 oz)   07/25/24 77 kg (169 lb 12.1 oz)   07/02/24 80.4 kg (177 lb 4 oz)   06/05/24 80 kg (176 lb 5.9 oz)   05/08/24 82.1 kg (181 lb)   05/07/24 82.1 kg (181 lb)   04/11/24 79.5 kg (175 lb 4.3 oz)     Lab Results   Component Value Date    WBC 9.62 07/02/2024    HGB 11.0 (L) 07/02/2024    HCT 32.9 (L) 07/02/2024    MCV 89 07/02/2024     07/02/2024     CMP  Sodium   Date Value Ref Range Status   07/02/2024 141 136 - 145 mmol/L Final     Potassium   Date Value Ref Range Status   07/02/2024 3.8 3.5 - 5.1 mmol/L Final     Chloride   Date Value Ref Range Status   07/02/2024 105 95 - 110 mmol/L Final     CO2   Date Value Ref Range Status   07/02/2024 23 23 - 29 mmol/L Final     Glucose   Date Value Ref Range Status   07/02/2024 184 (H) 70 - 110 mg/dL Final     BUN   Date Value Ref Range Status   07/02/2024 12 8 - 23 mg/dL Final     Creatinine   Date Value Ref Range Status   07/02/2024 1.1 0.5 - 1.4 mg/dL Final     Calcium   Date Value Ref Range Status   07/02/2024 8.9 8.7 - 10.5 mg/dL Final     Total Protein   Date Value Ref Range Status   07/02/2024 7.0 6.0 - 8.4 g/dL Final     Albumin   Date Value Ref Range Status   07/02/2024 3.5 3.5 - 5.2 g/dL Final     Total Bilirubin   Date Value Ref Range Status   07/02/2024 0.7 0.1 - 1.0 mg/dL Final     Comment:     For infants and newborns, interpretation of results should be based  on gestational age, weight and in agreement with clinical  observations.    Premature Infant recommended reference ranges:  Up to 24 hours.............<8.0 mg/dL  Up to 48 hours............<12.0 mg/dL  3-5 days..................<15.0 mg/dL  6-29 days.................<15.0 mg/dL       Alkaline Phosphatase   Date Value Ref Range Status   07/02/2024 61 55 - 135 U/L  Final     AST   Date Value Ref Range Status   07/02/2024 20 10 - 40 U/L Final     ALT   Date Value Ref Range Status   07/02/2024 14 10 - 44 U/L Final     Anion Gap   Date Value Ref Range Status   07/02/2024 13 8 - 16 mmol/L Final     Reviewed prior medical records including radiology report of CT abdomen and pelvis 08/29/2018 & endoscopy history (see surgical history).    Most recent abdominal imaging  CT A/P 08/29/18  IMPRESSION:   1. No evidence of renal stones or hydronephrosis. The bladder is distended which could relate to bladder outlet obstruction given enlargement of the prostate gland.   2. Mild nonspecific strandy change about both kidneys may be senescent in nature or may be seen with renal insufficiency. Infection can also cause this finding.   3. Cholelithiasis.   4. Near complete fatty replacement of the pancreas.   5. Spinal stimulant or device in place.     Colonoscopy 07/25/24  - Diverticulosis in the entire examined colon.  The entire examined colon is normal. Biopsied  Patho:  COLON, RANDOM BIOPSY:   - COLONIC MUCOSA WITH MILD ACTIVITY AND POORLY FORMED NON-NECROTIZING    GRANULOMA.   - NEGATIVE FOR EVIDENCE OF CHRONICITY.     Objective:      Physical Exam  Vitals and nursing note reviewed.   Constitutional:       General: He is not in acute distress.     Appearance: Normal appearance. He is not ill-appearing.   HENT:      Mouth/Throat:      Lips: Pink. No lesions.   Cardiovascular:      Rate and Rhythm: Normal rate and regular rhythm.      Heart sounds: Normal heart sounds.   Pulmonary:      Effort: Pulmonary effort is normal. No respiratory distress.      Breath sounds: Normal breath sounds.   Abdominal:      General: Bowel sounds are normal. There is no distension or abdominal bruit. There are no signs of injury.      Palpations: Abdomen is soft. There is no shifting dullness, fluid wave, hepatomegaly, splenomegaly or mass.      Tenderness: There is no abdominal tenderness. There is no guarding  or rebound. Negative signs include Corrigan's sign, Rovsing's sign and McBurney's sign.   Skin:     General: Skin is warm and dry.      Coloration: Skin is not jaundiced or pale.   Neurological:      Mental Status: He is alert and oriented to person, place, and time.   Psychiatric:         Attention and Perception: Attention normal.         Mood and Affect: Mood normal.         Speech: Speech normal.         Behavior: Behavior normal.         Assessment:       1. Ulcerative pancolitis with other complication    2. Chronic diarrhea    3. Lower abdominal pain    4. Abdominal cramping    5. Abdominal bloating    6. History of gastroesophageal reflux (GERD)        Plan:       Ulcerative pancolitis with other complication & Chronic diarrhea  - avoid lactose, alcohol, & caffeine  - avoid known triggers  - Recommended increase fiber in diet, especially soluble fiber since this can help bulk up the stool consistency and may help to slow down how fast the stool goes through the colon and can prevent diarrhea  - drug level prior to next Entyvio infusion 10/23/2024  -     Calprotectin, Stool; Future; Expected date: 10/21/2024  -     Giardia / Cryptosporidum, EIA; Future; Expected date: 10/21/2024  -     Stool Exam-Ova,Cysts,Parasites; Future; Expected date: 10/21/2024  -     Clostridium difficile EIA; Future; Expected date: 10/21/2024  -     Stool culture; Future; Expected date: 10/21/2024  -     TSH; Future; Expected date: 10/21/2024  -     Pancreatic elastase, fecal; Future; Expected date: 10/21/2024  -     CBC Without Differential; Future; Expected date: 10/21/2024  -     Comprehensive Metabolic Panel; Future; Expected date: 10/21/2024  -     Vedolizumab and Anti-Vedolizumab Ab; Future; Expected date: 10/21/2024  -     C-reactive protein; Future; Expected date: 10/21/2024  -     HEPATITIS B SURFACE ANTIBODY; Future; Expected date: 10/21/2024  -     HEPATITIS B CORE ANTIBODY, TOTAL; Future; Expected date: 10/21/2024  -      HEPATITIS B SURFACE ANTIGEN; Future; Expected date: 10/21/2024  - Continue Entyvio 300 mg every 4 weeks  - Continue Questran 4 gm b.I.d.  - Continue OTC Imodium PRN    Lower abdominal pain & Abdominal cramping  -     CT Abdomen Pelvis With IV Contrast Routine Oral Contrast; Future; Expected date: 10/21/2024    Abdominal bloating  - recommend OTC simethicone as directed, such as Phazyme or Gas-x  - recommend low gas diet: Reduce or eliminate these foods from your diet: Broccoli, Cauliflower, [place] sprouts, Cabbage, Cooked dried beans, Carbonated beverages (sparkling water, soda, beer, champagne)  Other Causes Of Excess Gas Include:   1) EATING TOO FAST or TALKING WHILE YOU CHEW may cause you to swallow air. This increases the amount of gas in the stomach and may worsen your symptoms.  --> Chew each mouthful completely before swallowing. Take your time.  2) OVEREATING may increase the feeling of being bloated and cause more gas.  --> When you are full, stop eating.  3) CONSTIPATION can increase the amount of normal intestinal gas.  --> Avoid constipation by increasing the amount of fiber in your diet by including whole cereal grains, fresh vegetables (except those in the above list) and fresh fruits. High-fiber foods absorb water and carry it out of the body. When increasing the amount of fiber in your diet, you also need to increase the amount of water that you drink. You should drink at least eight 8-ounce glasses of water (two quarts) per day.    History of gastroesophageal reflux (GERD)  -Avoid large meals, avoid eating within 2-3 hours of bedtime (avoid late night eating & lying down soon after eating), elevate head of bed if nocturnal symptoms are present, smoking cessation (if current smoker), & weight loss (if overweight).   -Avoid known foods which trigger reflux symptoms & to minimize/avoid high-fat foods, chocolate, caffeine, citrus, alcohol, & tomato products.  -Avoid/limit use of NSAID's, since they  can cause GI upset, bleeding, and/or ulcers. If needed, take with food.  -continue famotidine 40 mg once daily    Health Maintenance: recommended patient follow-up with PCP for health maintenance testing/immunizations   -COVID: UTD; recommend boosters when available  -Prevnar 13: UTD  -Pneumovax 23: UTD  -Flu Shot: Recommend annually  -Shingrix: Recommend    -Hepatitis A/Hepatitis B: Not immune, could consider vaccination  -Annual skin exam: Recommend   -Colon cancer screening: Risks factors: >1/3 of colon involved with colitis and >8-10 years of IBD duration, Distribution of colonic disease: pan-colonic  , Year of symptom onset: >60 years ; Colonoscopy: every 1-2 years for surveillance once in endoscopic remission  -DEXA scan: Recommend  -Tobacco: Avoid  -Should avoid NSAIDs and narcotics, use only Tylenol for pain relief.     Follow up in about 4 weeks (around 11/18/2024), or if symptoms worsen or fail to improve.      If no improvement in symptoms or symptoms worsen, call/follow-up at clinic or go to ER.        Total time spent on the encounter includes face to face time and non-face to face time preparing to see the patient (eg, review of tests), Obtaining and/or reviewing separately obtained history, Documenting clinical information in the electronic or other health record, Independently interpreting results (not separately reported) and communicating results to the patient/family/caregiver, or Care coordination (not separately reported).     A dictation software program was used for this note. Please expect some simple typographical  errors in this note.

## 2024-10-22 DIAGNOSIS — K51.018 ULCERATIVE PANCOLITIS WITH OTHER COMPLICATION: Primary | ICD-10-CM

## 2024-10-23 ENCOUNTER — INFUSION (OUTPATIENT)
Dept: INFUSION THERAPY | Facility: HOSPITAL | Age: 84
End: 2024-10-23
Attending: INTERNAL MEDICINE
Payer: MEDICARE

## 2024-10-23 VITALS
WEIGHT: 173.75 LBS | OXYGEN SATURATION: 99 % | TEMPERATURE: 98 F | HEART RATE: 63 BPM | RESPIRATION RATE: 18 BRPM | DIASTOLIC BLOOD PRESSURE: 55 MMHG | SYSTOLIC BLOOD PRESSURE: 97 MMHG | HEIGHT: 68 IN | BODY MASS INDEX: 26.33 KG/M2

## 2024-10-23 DIAGNOSIS — K51.018 ULCERATIVE PANCOLITIS WITH OTHER COMPLICATION: Primary | ICD-10-CM

## 2024-10-23 PROCEDURE — 96365 THER/PROPH/DIAG IV INF INIT: CPT | Mod: PN

## 2024-10-23 PROCEDURE — 25000003 PHARM REV CODE 250: Mod: PN | Performed by: INTERNAL MEDICINE

## 2024-10-23 PROCEDURE — 63600175 PHARM REV CODE 636 W HCPCS: Mod: JZ,JG,PN | Performed by: INTERNAL MEDICINE

## 2024-10-23 RX ORDER — HEPARIN 100 UNIT/ML
500 SYRINGE INTRAVENOUS
Status: DISCONTINUED | OUTPATIENT
Start: 2024-10-23 | End: 2024-10-23 | Stop reason: HOSPADM

## 2024-10-23 RX ORDER — DIPHENHYDRAMINE HYDROCHLORIDE 50 MG/ML
25 INJECTION INTRAMUSCULAR; INTRAVENOUS
OUTPATIENT
Start: 2024-11-20

## 2024-10-23 RX ORDER — ACETAMINOPHEN 325 MG/1
650 TABLET ORAL
OUTPATIENT
Start: 2024-11-20

## 2024-10-23 RX ORDER — SODIUM CHLORIDE 0.9 % (FLUSH) 0.9 %
10 SYRINGE (ML) INJECTION
OUTPATIENT
Start: 2024-11-20

## 2024-10-23 RX ORDER — METHYLPREDNISOLONE SOD SUCC 125 MG
40 VIAL (EA) INJECTION
OUTPATIENT
Start: 2024-11-20

## 2024-10-23 RX ORDER — SODIUM CHLORIDE 0.9 % (FLUSH) 0.9 %
10 SYRINGE (ML) INJECTION
Status: DISCONTINUED | OUTPATIENT
Start: 2024-10-23 | End: 2024-10-23 | Stop reason: HOSPADM

## 2024-10-23 RX ORDER — IPRATROPIUM BROMIDE AND ALBUTEROL SULFATE 2.5; .5 MG/3ML; MG/3ML
3 SOLUTION RESPIRATORY (INHALATION)
OUTPATIENT
Start: 2024-11-20

## 2024-10-23 RX ORDER — HEPARIN 100 UNIT/ML
500 SYRINGE INTRAVENOUS
OUTPATIENT
Start: 2024-11-20

## 2024-10-23 RX ORDER — EPINEPHRINE 1 MG/ML
0.3 INJECTION, SOLUTION, CONCENTRATE INTRAVENOUS
OUTPATIENT
Start: 2024-11-20

## 2024-10-23 RX ADMIN — SODIUM CHLORIDE: 9 INJECTION, SOLUTION INTRAVENOUS at 01:10

## 2024-10-23 RX ADMIN — VEDOLIZUMAB 300 MG: 300 INJECTION, POWDER, LYOPHILIZED, FOR SOLUTION INTRAVENOUS at 01:10

## 2024-10-25 ENCOUNTER — PATIENT MESSAGE (OUTPATIENT)
Dept: GASTROENTEROLOGY | Facility: CLINIC | Age: 84
End: 2024-10-25
Payer: MEDICARE

## 2024-10-30 ENCOUNTER — LAB VISIT (OUTPATIENT)
Dept: LAB | Facility: HOSPITAL | Age: 84
End: 2024-10-30
Payer: MEDICARE

## 2024-10-30 ENCOUNTER — TELEPHONE (OUTPATIENT)
Dept: GASTROENTEROLOGY | Facility: CLINIC | Age: 84
End: 2024-10-30
Payer: MEDICARE

## 2024-10-30 DIAGNOSIS — K52.9 CHRONIC DIARRHEA: Primary | ICD-10-CM

## 2024-10-30 DIAGNOSIS — K52.9 CHRONIC DIARRHEA: ICD-10-CM

## 2024-10-30 LAB
C DIFF GDH STL QL: NEGATIVE
C DIFF TOX A+B STL QL IA: NEGATIVE

## 2024-10-30 PROCEDURE — 87329 GIARDIA AG IA: CPT

## 2024-10-30 PROCEDURE — 87427 SHIGA-LIKE TOXIN AG IA: CPT

## 2024-10-30 PROCEDURE — 82653 EL-1 FECAL QUANTITATIVE: CPT

## 2024-10-30 PROCEDURE — 87324 CLOSTRIDIUM AG IA: CPT

## 2024-10-30 PROCEDURE — 87045 FECES CULTURE AEROBIC BACT: CPT

## 2024-10-30 PROCEDURE — 87046 STOOL CULTR AEROBIC BACT EA: CPT

## 2024-10-30 PROCEDURE — 87449 NOS EACH ORGANISM AG IA: CPT | Mod: 91

## 2024-10-30 PROCEDURE — 83993 ASSAY FOR CALPROTECTIN FECAL: CPT

## 2024-10-31 LAB
CRYPTOSP AG STL QL IA: NEGATIVE
G LAMBLIA AG STL QL IA: NEGATIVE

## 2024-11-01 LAB
BACTERIA STL CULT: NORMAL
E COLI SXT1 STL QL IA: NEGATIVE
E COLI SXT2 STL QL IA: NEGATIVE

## 2024-11-02 LAB — ELASTASE 1, FECAL: <40 MCG/G

## 2024-11-04 ENCOUNTER — TELEPHONE (OUTPATIENT)
Dept: GASTROENTEROLOGY | Facility: CLINIC | Age: 84
End: 2024-11-04
Payer: MEDICARE

## 2024-11-04 DIAGNOSIS — K86.89 PANCREATIC INSUFFICIENCY: Primary | ICD-10-CM

## 2024-11-04 LAB — CALPROTECTIN STL-MCNT: 55.6 MCG/G

## 2024-11-04 RX ORDER — PANCRELIPASE 36000; 180000; 114000 [USP'U]/1; [USP'U]/1; [USP'U]/1
1 CAPSULE, DELAYED RELEASE PELLETS ORAL
Qty: 90 CAPSULE | Refills: 2 | Status: SHIPPED | OUTPATIENT
Start: 2024-11-04 | End: 2025-02-02

## 2024-11-04 NOTE — TELEPHONE ENCOUNTER
Spoke with pt. Pt wanted to let office know all testing, procedures, infusions, etc., should be billed through [employer] not medicare. Pt received bills from Medicare & was upset. Pt informed to call billing to have this corrected. Pt stated he already has but wanted to let office know as well.

## 2024-11-04 NOTE — TELEPHONE ENCOUNTER
----- Message from John sent at 11/4/2024  8:24 AM CST -----  Contact: Self  Type: Needs Medical Advice    Who Called:  Patient      Best Call Back Number: [phone]  Additional Information: Patient is requesting a call back from Yenny regarding using his [employer] insurance for billing

## 2024-11-04 NOTE — TELEPHONE ENCOUNTER
----- Message from Marlee sent at 11/4/2024  3:44 PM CST -----  Regarding: return call  Contact: patient  Type:  Patient Returning Call    Who Called:  patient  Who Left Message for Patient:  Kathy Marin NP  Does the patient know what this is regarding?:  results  Best Call Back Number:  [phone] (home)     Additional Information:  Please call patient to advise.  Thanks!

## 2024-11-06 LAB — O+P STL MICRO: NORMAL

## 2024-11-14 ENCOUNTER — HOSPITAL ENCOUNTER (OUTPATIENT)
Dept: RADIOLOGY | Facility: HOSPITAL | Age: 84
Discharge: HOME | End: 2024-11-14
Attending: ORTHOPAEDIC SURGERY
Payer: MEDICARE

## 2024-11-14 ENCOUNTER — PATIENT MESSAGE (OUTPATIENT)
Dept: GASTROENTEROLOGY | Facility: CLINIC | Age: 84
End: 2024-11-14
Payer: MEDICARE

## 2024-11-14 ENCOUNTER — OFFICE VISIT (OUTPATIENT)
Dept: ORTHOPEDICS | Facility: CLINIC | Age: 84
End: 2024-11-14
Payer: MEDICARE

## 2024-11-14 DIAGNOSIS — M25.531 RIGHT WRIST PAIN: Primary | ICD-10-CM

## 2024-11-14 DIAGNOSIS — S52.571A OTHER CLOSED INTRA-ARTICULAR FRACTURE OF DISTAL END OF RIGHT RADIUS, INITIAL ENCOUNTER: Primary | ICD-10-CM

## 2024-11-14 DIAGNOSIS — M25.531 RIGHT WRIST PAIN: ICD-10-CM

## 2024-11-14 PROBLEM — S52.501A CLOSED FRACTURE OF RIGHT DISTAL RADIUS: Status: ACTIVE | Noted: 2024-11-14

## 2024-11-14 PROCEDURE — 99213 OFFICE O/P EST LOW 20 MIN: CPT | Mod: PBBFAC,25 | Performed by: ORTHOPAEDIC SURGERY

## 2024-11-14 PROCEDURE — 99999 PR PBB SHADOW E&M-EST. PATIENT-LVL III: CPT | Mod: PBBFAC,,, | Performed by: ORTHOPAEDIC SURGERY

## 2024-11-14 PROCEDURE — 73110 X-RAY EXAM OF WRIST: CPT | Mod: 26,RT,, | Performed by: RADIOLOGY

## 2024-11-14 PROCEDURE — 73110 X-RAY EXAM OF WRIST: CPT | Mod: TC,RT

## 2024-11-14 RX ORDER — HYDROCODONE BITARTRATE AND ACETAMINOPHEN 5; 325 MG/1; MG/1
1 TABLET ORAL EVERY 6 HOURS PRN
Qty: 10 TABLET | Refills: 0 | Status: SHIPPED | OUTPATIENT
Start: 2024-11-14

## 2024-11-14 NOTE — PROGRESS NOTES
ALDEN Shea M.D.  Orthopaedic Hand and Wrist Surgery  [address]    Patient ID: Alfie Olson  YOB: 1940  MRN: [id number]    Provider Note/Medical Decision Makin. Other closed intra-articular fracture of distal end of right radius, initial encounter  Assessment & Plan:  The patient and I talked at length about the natural history and pathophysiology of right distal radius fracture, he understands that this is a chronic problem which may have acute episodic exacerbations.   Symptoms may resolve, worsen and even become permanent.  The patient understands the treatment options including observation, activity modification, therapy, NSAIDs, splints, injections and the surgical options including volar plate fixation.  We discussed the risks of the diagnosis and the treatment options including pain, infection, bleeding, damage to nerves and vessels, stiffness, scarring, incomplete relief or recurrence of symptoms, poor pain and functional outcomes.  Unique risks of this diagnosis and the treatment include risk of displacement and need for additional surgery.   The patient has elected to proceed with nonoperative treatment and we will follow up next week with x-rays.  He has been placed in a short-arm fiberglass cast today which was applied.  He understands the risk of displacement with lifting pushing or pulling.  He understands to keep the cast clean and dry we will check him next week he understands he may need a surgery if his fracture displaces.      Orders:  -     HYDROcodone-acetaminophen (NORCO) 5-325 mg per tablet; Take 1 tablet by mouth every 6 (six) hours as needed for Pain.  Dispense: 10 tablet; Refill: 0         Chief Complaint: Pain and Injury of the Right Wrist and Pain and Injury of the Right Forearm      Referred By: Self,Aaareferral    History of Present Illness: Alfie Olson is a 84 y.o. male who fell yesterday in the rain he [facility] went [facility] where he was placed in a splint and told to follow up with an orthopedist he is here today due to continued right wrist pain he denies any prior history of wrist injury.      Patient was queried and this is the extent of the patients current complaints today.    Past Medical History:     Estimated body mass index is 26.41 kg/m² as calculated from the following:    Height as of 10/23/24: 5' 8" (1.727 m).    Weight as of 10/23/24: 78.8 kg (173 lb 11.6 oz).  Past Medical History:   Diagnosis Date    Arthritis     B12 deficiency     Cholelithiasis     Chronic back pain     Chronic diarrhea     Colon polyp     Diverticulosis     GERD (gastroesophageal reflux disease)     Glaucoma     left eye    Hyperlipemia     JUAN DIEGO (obstructive sleep apnea)     denies CPAP use    Skin cancer     Ulcerative colitis 1961    Vitamin D deficiency      Past Surgical History:   Procedure Laterality Date    BACK SURGERY      CATARACT EXTRACTION W/ INTRAOCULAR LENS IMPLANT Right     COLONOSCOPY  2014    COLONOSCOPY  03/25/2015    diverticulosis, otherwise normal findings, biopsies taken from 4 quadrants- see media section for biopsy report, repeat in 1 year    COLONOSCOPY N/A 6/1/2016    Procedure: COLONOSCOPY;  Surgeon: Ravindra Ervin MD;  Location: [facility];  Service: Endoscopy;  Laterality: N/A;    COLONOSCOPY N/A 9/1/2022    Procedure: COLONOSCOPY;  Surgeon: Lencho Ivory MD;  Location: [facility];  Service: Endoscopy;  Laterality: N/A;    COLONOSCOPY N/A 8/18/2023    Procedure: COLONOSCOPY;  Surgeon: Lencho Ivory MD;  Location: [facility];  Service: Endoscopy;  Laterality: N/A;    COLONOSCOPY N/A 7/25/2024    Procedure: COLONOSCOPY;  Surgeon: Ravindra Ervin MD;  Location: [facility];  Service: Gastroenterology;  Laterality: N/A;    HERNIA REPAIR      rt inguinal    LUMBAR FUSION      L3,4,5    MULTIPLE TOOTH EXTRACTIONS      pain pump placement  04/08/2010    dilaudid/baclofen    SKIN BIOPSY      SKIN " CANCER EXCISION      UPPER GASTROINTESTINAL ENDOSCOPY  10/26/2009    hiatal hernia     Family History   Problem Relation Name Age of Onset    Ulcerative colitis Son      Colon cancer Neg Hx      Colon polyps Neg Hx       Social History     Socioeconomic History    Marital status:    Tobacco Use    Smoking status: Never    Smokeless tobacco: Never   Substance and Sexual Activity    Alcohol use: No    Drug use: No     Medication List with Changes/Refills   New Medications    HYDROCODONE-ACETAMINOPHEN (NORCO) 5-325 MG PER TABLET    Take 1 tablet by mouth every 6 (six) hours as needed for Pain.   Current Medications    ALBUTEROL (PROVENTIL) 5 MG/ML NEBULIZER SOLUTION    Take 2.5 mg by nebulization every 6 (six) hours as needed for Wheezing.    AMMONIUM LACTATE (LAC-HYDRIN) 12 % LOTION    APPLY LOTION TOPICALLY TWICE A DAY FOR DRY SKIN    ASPIRIN 81 MG CHEW    1 tablet.    BUDESONIDE-FORMOTEROL 160-4.5 MCG (SYMBICORT) 160-4.5 MCG/ACTUATION HFAA    2 puffs Inhalation Twice a day    CAMPHOR-MENTHOL 0.5-0.5% (SARNA) LOTION    APPLY LIBERAL AMOUNT TOPICALLY  AS NEEDED FOR ITCHING    CHOLECALCIFEROL, VITAMIN D3, (VITAMIN D3) 25 MCG (1,000 UNIT) CAPSULE    Take 1,000 Units by mouth.    CHOLESTYRAMINE, WITH SUGAR, 4 GRAM POWD    Take 4 g by mouth 2 (two) times a day.    CLOPIDOGREL (PLAVIX) 75 MG TABLET    75 mg.    CYANOCOBALAMIN, VITAMIN B-12, (VITAMIN B-12 ORAL)    Take by mouth.    ESCITALOPRAM OXALATE (LEXAPRO) 10 MG TABLET    Take 1 tablet by mouth once daily.    FAMOTIDINE (PEPCID) 40 MG TABLET    40 mg.    FLUTICASONE-SALMETEROL DISKUS INHALER 250-50 MCG        FOLIC ACID (FOLVITE) 1 MG TABLET    Take 1 mg by mouth once daily.    INTRATHECAL PAIN PUMP COMPOUND    1 mL by Intrathecal route once. Medtronic pump  400mcg/ml   100mcg/ml  194.51mcg dilaudid/day  48.63mcg baclofen/day  Refill q71 days    KETOCONAZOLE (NIZORAL) 2 % CREAM    APPLY SMALL AMOUNT TOPICALLY ONCE DAILY FOR FUNGAL INFECTION    LATANOPROST 0.005  % OPHTHALMIC SOLUTION    Place 1 drop into both eyes every evening.    LIPASE-PROTEASE-AMYLASE (CREON) 36,000-114,000- 180,000 UNIT CPDR    Take 1 capsule by mouth 3 (three) times daily with meals.    MONTELUKAST (SINGULAIR) 10 MG TABLET    10 mg.    MULTIVITAMIN (THERAGRAN) PER TABLET    Take 1 tablet by mouth once daily.    MUPIROCIN (BACTROBAN) 2 % OINTMENT    Apply topically 3 (three) times daily.    OMEGA 3-DHA-EPA-FISH OIL (FISH OIL) 100-160-1,000 MG CAP    1,000 mg.    TAMSULOSIN (FLOMAX) 0.4 MG CAP    tamsulosin 0.4 mg capsule     Review of patient's allergies indicates:  No Known Allergies  ROS    Physical Exam:   GENERAL: Well appearing, appropriate for stated age, no acute distress.  CARDIOVASCULAR:  Fingers have good brisk refill and good turgor.   PULMONARY: Respirations are even and non-labored.  NEURO: Awake, alert, and oriented x 3.  PSYCH: Mood & affect are appropriate.  Ortho/SPM Exam  Hand/Wrist Musculoskeletal Exam  Intact EPL FPL and FDP to all fingers  Pain with wrist flexion-extension  Mild wrist swelling  No tenderness in his elbow  No open wounds  No decreased sensation to his fingertips    Imaging:    Relevant imaging results reviewed and interpreted by me, discussed with the patient and / or family today.   X-rays reviewed of his right wrist which showed a right intra-articular distal radius fracture with dorsal angulation    Provider Note/Medical Decision Makin. Other closed intra-articular fracture of distal end of right radius, initial encounter  Assessment & Plan:  The patient and I talked at length about the natural history and pathophysiology of right distal radius fracture, he understands that this is a chronic problem which may have acute episodic exacerbations.   Symptoms may resolve, worsen and even become permanent.  The patient understands the treatment options including observation, activity modification, therapy, NSAIDs, splints, injections and the surgical options  including volar plate fixation.  We discussed the risks of the diagnosis and the treatment options including pain, infection, bleeding, damage to nerves and vessels, stiffness, scarring, incomplete relief or recurrence of symptoms, poor pain and functional outcomes.  Unique risks of this diagnosis and the treatment include risk of displacement and need for additional surgery.   The patient has elected to proceed with nonoperative treatment and we will follow up next week with x-rays.  He has been placed in a short-arm fiberglass cast today which was applied.  He understands the risk of displacement with lifting pushing or pulling.  He understands to keep the cast clean and dry we will check him next week he understands he may need a surgery if his fracture displaces.      Orders:  -     HYDROcodone-acetaminophen (NORCO) 5-325 mg per tablet; Take 1 tablet by mouth every 6 (six) hours as needed for Pain.  Dispense: 10 tablet; Refill: 0         I discussed worrisome and red flag signs and symptoms with the patient. The patient expressed understanding and agreed to alert me immediately or to go to the emergency room if they experience any of these.   Treatment plan was developed with input from the patient/family, and they expressed understanding and agreement with the plan. All questions were answered today.    There are no Patient Instructions on file for this visit.    ALDEN Shea M.D.  [facility]  Orthopedic Hand and Wrist Surgeon    [facility]  Dr. Yon Shea   [employer]   [employer]   [employer]     Disclaimer: This note was prepared using a voice recognition system and is likely to have sound alike errors within the text.

## 2024-11-14 NOTE — ASSESSMENT & PLAN NOTE
The patient and I talked at length about the natural history and pathophysiology of right distal radius fracture, he understands that this is a chronic problem which may have acute episodic exacerbations.   Symptoms may resolve, worsen and even become permanent.  The patient understands the treatment options including observation, activity modification, therapy, NSAIDs, splints, injections and the surgical options including volar plate fixation.  We discussed the risks of the diagnosis and the treatment options including pain, infection, bleeding, damage to nerves and vessels, stiffness, scarring, incomplete relief or recurrence of symptoms, poor pain and functional outcomes.  Unique risks of this diagnosis and the treatment include risk of displacement and need for additional surgery.   The patient has elected to proceed with nonoperative treatment and we will follow up next week with x-rays.  He has been placed in a short-arm fiberglass cast today which was applied.  He understands the risk of displacement with lifting pushing or pulling.  He understands to keep the cast clean and dry we will check him next week he understands he may need a surgery if his fracture displaces.

## 2024-11-15 DIAGNOSIS — K86.89 PANCREATIC INSUFFICIENCY: ICD-10-CM

## 2024-11-15 RX ORDER — PANCRELIPASE 36000; 180000; 114000 [USP'U]/1; [USP'U]/1; [USP'U]/1
1 CAPSULE, DELAYED RELEASE PELLETS ORAL
Qty: 90 CAPSULE | Refills: 2 | Status: SHIPPED | OUTPATIENT
Start: 2024-11-15 | End: 2025-02-13

## 2024-11-20 ENCOUNTER — INFUSION (OUTPATIENT)
Dept: INFUSION THERAPY | Facility: HOSPITAL | Age: 84
End: 2024-11-20
Attending: INTERNAL MEDICINE
Payer: OTHER GOVERNMENT

## 2024-11-20 VITALS
WEIGHT: 173.5 LBS | OXYGEN SATURATION: 99 % | HEIGHT: 68 IN | TEMPERATURE: 98 F | RESPIRATION RATE: 17 BRPM | SYSTOLIC BLOOD PRESSURE: 114 MMHG | HEART RATE: 74 BPM | BODY MASS INDEX: 26.3 KG/M2 | DIASTOLIC BLOOD PRESSURE: 65 MMHG

## 2024-11-20 DIAGNOSIS — K51.018 ULCERATIVE PANCOLITIS WITH OTHER COMPLICATION: Primary | ICD-10-CM

## 2024-11-20 PROCEDURE — 25000003 PHARM REV CODE 250: Mod: PN | Performed by: INTERNAL MEDICINE

## 2024-11-20 PROCEDURE — 96365 THER/PROPH/DIAG IV INF INIT: CPT | Mod: PN

## 2024-11-20 PROCEDURE — A4216 STERILE WATER/SALINE, 10 ML: HCPCS | Mod: PN | Performed by: INTERNAL MEDICINE

## 2024-11-20 PROCEDURE — 63600175 PHARM REV CODE 636 W HCPCS: Mod: JZ,JG,PN | Performed by: INTERNAL MEDICINE

## 2024-11-20 RX ORDER — HEPARIN 100 UNIT/ML
500 SYRINGE INTRAVENOUS
OUTPATIENT
Start: 2024-12-18

## 2024-11-20 RX ORDER — DIPHENHYDRAMINE HYDROCHLORIDE 50 MG/ML
25 INJECTION INTRAMUSCULAR; INTRAVENOUS
Status: DISCONTINUED | OUTPATIENT
Start: 2024-11-20 | End: 2024-11-20 | Stop reason: HOSPADM

## 2024-11-20 RX ORDER — DIPHENHYDRAMINE HYDROCHLORIDE 50 MG/ML
25 INJECTION INTRAMUSCULAR; INTRAVENOUS
OUTPATIENT
Start: 2024-12-18

## 2024-11-20 RX ORDER — SODIUM CHLORIDE 0.9 % (FLUSH) 0.9 %
10 SYRINGE (ML) INJECTION
OUTPATIENT
Start: 2024-12-18

## 2024-11-20 RX ORDER — SODIUM CHLORIDE 0.9 % (FLUSH) 0.9 %
10 SYRINGE (ML) INJECTION
Status: DISCONTINUED | OUTPATIENT
Start: 2024-11-20 | End: 2024-11-20 | Stop reason: HOSPADM

## 2024-11-20 RX ORDER — IPRATROPIUM BROMIDE AND ALBUTEROL SULFATE 2.5; .5 MG/3ML; MG/3ML
3 SOLUTION RESPIRATORY (INHALATION)
OUTPATIENT
Start: 2024-12-18

## 2024-11-20 RX ORDER — ACETAMINOPHEN 325 MG/1
650 TABLET ORAL
Status: DISCONTINUED | OUTPATIENT
Start: 2024-11-20 | End: 2024-11-20 | Stop reason: HOSPADM

## 2024-11-20 RX ORDER — ACETAMINOPHEN 325 MG/1
650 TABLET ORAL
OUTPATIENT
Start: 2024-12-18

## 2024-11-20 RX ORDER — IPRATROPIUM BROMIDE AND ALBUTEROL SULFATE 2.5; .5 MG/3ML; MG/3ML
3 SOLUTION RESPIRATORY (INHALATION)
Status: DISCONTINUED | OUTPATIENT
Start: 2024-11-20 | End: 2024-11-20

## 2024-11-20 RX ORDER — METHYLPREDNISOLONE SOD SUCC 125 MG
40 VIAL (EA) INJECTION
OUTPATIENT
Start: 2024-12-18

## 2024-11-20 RX ORDER — EPINEPHRINE 1 MG/ML
0.3 INJECTION, SOLUTION, CONCENTRATE INTRAVENOUS
OUTPATIENT
Start: 2024-12-18

## 2024-11-20 RX ORDER — EPINEPHRINE 0.3 MG/.3ML
0.3 INJECTION SUBCUTANEOUS
Status: DISCONTINUED | OUTPATIENT
Start: 2024-11-20 | End: 2024-11-20 | Stop reason: HOSPADM

## 2024-11-20 RX ORDER — HEPARIN 100 UNIT/ML
500 SYRINGE INTRAVENOUS
Status: DISCONTINUED | OUTPATIENT
Start: 2024-11-20 | End: 2024-11-20 | Stop reason: HOSPADM

## 2024-11-20 RX ADMIN — VEDOLIZUMAB 300 MG: 300 INJECTION, POWDER, LYOPHILIZED, FOR SOLUTION INTRAVENOUS at 01:11

## 2024-11-20 RX ADMIN — SODIUM CHLORIDE: 9 INJECTION, SOLUTION INTRAVENOUS at 01:11

## 2024-11-20 RX ADMIN — Medication 10 ML: at 01:11

## 2024-11-20 NOTE — PLAN OF CARE
Pt here for IV infusion of entyvio. Pt tolerated well, no reactions noted. Education reviewed r/t medication. Pt questions answered at this time. Pt ambulates off unit, denies any needs before departure. Pt aware of follow up appointments.     Problem: Fall Injury Risk  Goal: Absence of Fall and Fall-Related Injury  Outcome: Progressing  Intervention: Identify and Manage Contributors  Flowsheets (Taken 11/20/2024 1413)  Self-Care Promotion: independence encouraged  Intervention: Promote Injury-Free Environment  Flowsheets (Taken 11/20/2024 1413)  Safety Promotion/Fall Prevention: assistive device/personal item within reach     Problem: Adult Inpatient Plan of Care  Goal: Plan of Care Review  Outcome: Progressing  Flowsheets (Taken 11/20/2024 1413)  Plan of Care Reviewed With: patient  Goal: Patient-Specific Goal (Individualized)  Outcome: Progressing  Flowsheets (Taken 11/20/2024 1413)  Individualized Care Needs: recliner, tv, conversation  Anxieties, Fears or Concerns: none  Patient/Family-Specific Goals (Include Timeframe): no s/s of reaction  Goal: Optimal Comfort and Wellbeing  Outcome: Progressing  Intervention: Monitor Pain and Promote Comfort  Flowsheets (Taken 11/20/2024 1413)  Pain Management Interventions:   quiet environment facilitated   care clustered  Intervention: Provide Person-Centered Care  Flowsheets (Taken 11/20/2024 1413)  Trust Relationship/Rapport:   care explained   reassurance provided   choices provided   thoughts/feelings acknowledged   emotional support provided   empathic listening provided   questions answered   questions encouraged

## 2024-11-21 ENCOUNTER — OFFICE VISIT (OUTPATIENT)
Dept: ORTHOPEDICS | Facility: CLINIC | Age: 84
End: 2024-11-21
Payer: OTHER GOVERNMENT

## 2024-11-21 ENCOUNTER — HOSPITAL ENCOUNTER (OUTPATIENT)
Dept: RADIOLOGY | Facility: HOSPITAL | Age: 84
Discharge: HOME | End: 2024-11-21
Attending: ORTHOPAEDIC SURGERY
Payer: MEDICARE

## 2024-11-21 DIAGNOSIS — S52.551P OTHER CLOSED EXTRA-ARTICULAR FRACTURE OF DISTAL END OF RIGHT RADIUS WITH MALUNION, SUBSEQUENT ENCOUNTER: Primary | ICD-10-CM

## 2024-11-21 DIAGNOSIS — M25.531 RIGHT WRIST PAIN: ICD-10-CM

## 2024-11-21 DIAGNOSIS — S52.571A OTHER CLOSED INTRA-ARTICULAR FRACTURE OF DISTAL END OF RIGHT RADIUS, INITIAL ENCOUNTER: Primary | ICD-10-CM

## 2024-11-21 PROCEDURE — 73110 X-RAY EXAM OF WRIST: CPT | Mod: TC,RT

## 2024-11-21 PROCEDURE — 99213 OFFICE O/P EST LOW 20 MIN: CPT | Mod: PBBFAC,25 | Performed by: ORTHOPAEDIC SURGERY

## 2024-11-21 PROCEDURE — 99999 PR PBB SHADOW E&M-EST. PATIENT-LVL III: CPT | Mod: PBBFAC,,, | Performed by: ORTHOPAEDIC SURGERY

## 2024-11-21 PROCEDURE — 73110 X-RAY EXAM OF WRIST: CPT | Mod: 26,RT,, | Performed by: STUDENT IN AN ORGANIZED HEALTH CARE EDUCATION/TRAINING PROGRAM

## 2024-11-21 NOTE — ASSESSMENT & PLAN NOTE
The patient and I talked at length about the natural history and pathophysiology of right distal radius fracture which has displaced further, he understands that this is a chronic problem which may have acute episodic exacerbations.   Symptoms may resolve, worsen and even become permanent.  The patient understands the treatment options including observation, activity modification, therapy, NSAIDs, splints, injections and the surgical options including volar plate fixation.  We discussed the risks of the diagnosis and the treatment options including pain, infection, bleeding, damage to nerves and vessels, stiffness, scarring, incomplete relief or recurrence of symptoms, poor pain and functional outcomes.  Unique risks of this diagnosis and the treatment include risk of displacement and need for additional surgery.   The patient has elected to proceed with nonoperative treatment and we will follow up next week with x-rays.  He will continue with a short-arm fiberglass cast.  He understands the risk of displacement with lifting pushing or pulling.  He understands to keep the cast clean and dry we will check him next week he understands he may need a surgery if his fracture displaces further.  I showed him his x-rays from last week and this week and I have shown him that it has displaced from last week he still we would like to try to treat this nonoperatively

## 2024-11-22 ENCOUNTER — HOSPITAL ENCOUNTER (OUTPATIENT)
Dept: RADIOLOGY | Facility: CLINIC | Age: 84
Discharge: HOME | End: 2024-11-22
Payer: MEDICARE

## 2024-11-22 DIAGNOSIS — R10.30 LOWER ABDOMINAL PAIN: ICD-10-CM

## 2024-11-22 PROCEDURE — 74177 CT ABD & PELVIS W/CONTRAST: CPT | Mod: 26,,, | Performed by: RADIOLOGY

## 2024-11-22 PROCEDURE — A9698 NON-RAD CONTRAST MATERIALNOC: HCPCS | Mod: PN

## 2024-11-22 PROCEDURE — 25500020 PHARM REV CODE 255: Mod: PN

## 2024-11-22 PROCEDURE — 74177 CT ABD & PELVIS W/CONTRAST: CPT | Mod: TC,PN

## 2024-11-22 RX ADMIN — IOHEXOL 75 ML: 350 INJECTION, SOLUTION INTRAVENOUS at 03:11

## 2024-11-22 RX ADMIN — BARIUM SULFATE 900 ML: 20 SUSPENSION ORAL at 04:11

## 2024-11-25 ENCOUNTER — TELEPHONE (OUTPATIENT)
Dept: GASTROENTEROLOGY | Facility: CLINIC | Age: 84
End: 2024-11-25
Payer: MEDICARE

## 2024-11-25 ENCOUNTER — TELEPHONE (OUTPATIENT)
Facility: CLINIC | Age: 84
End: 2024-11-25
Payer: MEDICARE

## 2024-11-25 DIAGNOSIS — R93.3 ABNORMAL FINDING ON GI TRACT IMAGING: ICD-10-CM

## 2024-11-25 DIAGNOSIS — K80.20 GALLSTONES: Primary | ICD-10-CM

## 2024-11-25 DIAGNOSIS — R93.89 ABNORMAL CT OF THE CHEST: ICD-10-CM

## 2024-11-25 DIAGNOSIS — R93.89 ABNORMAL CT SCAN: Primary | ICD-10-CM

## 2024-11-25 DIAGNOSIS — K86.89 PANCREATIC INSUFFICIENCY: ICD-10-CM

## 2024-11-25 DIAGNOSIS — R93.3 ABNORMAL FINDING ON GI TRACT IMAGING: Primary | ICD-10-CM

## 2024-11-25 RX ORDER — PANCRELIPASE 36000; 180000; 114000 [USP'U]/1; [USP'U]/1; [USP'U]/1
1 CAPSULE, DELAYED RELEASE PELLETS ORAL
Qty: 90 CAPSULE | Refills: 2 | Status: SHIPPED | OUTPATIENT
Start: 2024-11-25 | End: 2025-02-23

## 2024-11-25 NOTE — NURSING
Received referral for pt with abn CT imaging.   Reached out to patient and scheduled a visit in our lung clinic for 11/26 with Dr White.  Will request LDCT imaging from [place].    Date, time, and location of appt has been confirmed.      Oncology Navigation   Intake  Cancer Type: LDCT/Incidental Lung Finding  Type of Referral: Internal  Date of Referral: 11/25/24  Initial Nurse Navigator Contact: 11/25/24  Referral to Initial Contact Timeline (days): 0  First Appointment Available: 11/26/24  Appointment Date: 11/26/24  First Available Date vs. Scheduled Date (days): 0     Treatment  Current Status: Staging work-up             Procedures: CT  CT Schedule Date: 11/22/24                 Acuity      Follow Up  No follow-ups on file.

## 2024-11-26 ENCOUNTER — DOCUMENTATION ONLY (OUTPATIENT)
Facility: CLINIC | Age: 84
End: 2024-11-26
Payer: MEDICARE

## 2024-11-26 ENCOUNTER — TUMOR BOARD CONFERENCE (OUTPATIENT)
Dept: HEMATOLOGY/ONCOLOGY | Facility: CLINIC | Age: 84
End: 2024-11-26
Payer: MEDICARE

## 2024-11-26 ENCOUNTER — OFFICE VISIT (OUTPATIENT)
Facility: CLINIC | Age: 84
End: 2024-11-26
Payer: MEDICARE

## 2024-11-26 VITALS
DIASTOLIC BLOOD PRESSURE: 74 MMHG | BODY MASS INDEX: 25.86 KG/M2 | HEART RATE: 106 BPM | SYSTOLIC BLOOD PRESSURE: 147 MMHG | TEMPERATURE: 97 F | HEIGHT: 68 IN | WEIGHT: 170.63 LBS | OXYGEN SATURATION: 96 % | RESPIRATION RATE: 16 BRPM

## 2024-11-26 DIAGNOSIS — R93.89 ABNORMAL CT SCAN: ICD-10-CM

## 2024-11-26 PROCEDURE — 99204 OFFICE O/P NEW MOD 45 MIN: CPT | Mod: S$PBB,,, | Performed by: INTERNAL MEDICINE

## 2024-11-26 PROCEDURE — 99999 PR PBB SHADOW E&M-EST. PATIENT-LVL V: CPT | Mod: PBBFAC,,, | Performed by: INTERNAL MEDICINE

## 2024-11-26 PROCEDURE — 99215 OFFICE O/P EST HI 40 MIN: CPT | Mod: PBBFAC,PN | Performed by: INTERNAL MEDICINE

## 2024-11-26 RX ORDER — ROSUVASTATIN CALCIUM 5 MG/1
5 TABLET, COATED ORAL
COMMUNITY
Start: 2024-10-24

## 2024-11-26 RX ORDER — SODIUM CHLORIDE 50 MG/ML
SOLUTION/ DROPS OPHTHALMIC
COMMUNITY
Start: 2024-10-24

## 2024-11-26 RX ORDER — ACETAMINOPHEN 500 MG
1 TABLET ORAL 4 TIMES DAILY PRN
COMMUNITY
Start: 2024-10-24

## 2024-11-26 RX ORDER — FLUOROURACIL 50 MG/G
CREAM TOPICAL
COMMUNITY
Start: 2024-11-01

## 2024-11-26 NOTE — PROGRESS NOTES
[facility]   Initial Office Visit      SUBJECTIVE:     History of Present Illness:  Patient is a 84 y.o. male presents for   Chief Complaint   Patient presents with    Abnormal Ct Scan       History of Present Illness    CHIEF COMPLAINT:  - Mr. Olson presents for evaluation of new lung findings on a recent CT of the abdomen, which was originally ordered for ulcerative colitis follow-up.    HPI:  Mr. Olson underwent a CT of the abdomen for ulcerative colitis follow-up, which incidentally revealed new findings in the lower portion of the lungs. 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 CT of the chest from 2019 shows pleural-based nodules with calcifications consistent with asbestos-related pleural plaques.    Mr. Olson has ulcerative colitis managed with Entyvio infusions at the clinic. He also reports prior diagnoses of COPD and asthma, controlled with an inhaler. His cardiac history includes one stent placed 2-3 years ago, for which he takes Plavix. [facility] and has an upcoming [facility] next month.    He recently injured his wrist after slipping on a wet wooden ramp in his shop and has an appointment scheduled with a hand specialist to discuss potential surgery.    Mr. Olson quit smoking in  and denies any personal history of cancer.    MEDICATIONS:  - Entyvio infusions for ulcerative colitis  - Inhaler for COPD and asthma  - Currently on Plavix     MEDICAL HISTORY:  - Ulcerative colitis  - Asbestos exposure  - COPD  - Asthma  - Coronary artery disease    FAMILY HISTORY:  - Sister: Liver cancer, recently     SURGICAL HISTORY:  - Coronary artery stent: 2-3 years ago    SOCIAL HISTORY:  - Smoking: Quit in , previously smoked up to 2 packs a day  - Occupation: Worked in [employer]  -  History:  who received  care at [facility]         Past Medical History:   Diagnosis Date    Arthritis     B12 deficiency     Cholelithiasis     Chronic back pain     Chronic diarrhea     Colon polyp     Diverticulosis     GERD (gastroesophageal reflux disease)     Glaucoma     left eye    Hyperlipemia     JUAN DIEGO (obstructive sleep apnea)     denies CPAP use    Skin cancer     Ulcerative colitis 1961    Vitamin D deficiency      Past Surgical History:   Procedure Laterality Date    BACK SURGERY      CATARACT EXTRACTION W/ INTRAOCULAR LENS IMPLANT Right     COLONOSCOPY  2014    COLONOSCOPY  03/25/2015    diverticulosis, otherwise normal findings, biopsies taken from 4 quadrants- see media section for biopsy report, repeat in 1 year    COLONOSCOPY N/A 6/1/2016    Procedure: COLONOSCOPY;  Surgeon: Ravindra Ervin MD;  Location: [facility];  Service: Endoscopy;  Laterality: N/A;    COLONOSCOPY N/A 9/1/2022    Procedure: COLONOSCOPY;  Surgeon: Lencho Ivory MD;  Location: [facility];  Service: Endoscopy;  Laterality: N/A;    COLONOSCOPY N/A 8/18/2023    Procedure: COLONOSCOPY;  Surgeon: Lencho Ivory MD;  Location: [facility];  Service: Endoscopy;  Laterality: N/A;    COLONOSCOPY N/A 7/25/2024    Procedure: COLONOSCOPY;  Surgeon: Ravindra Ervin MD;  Location: [facility];  Service: Gastroenterology;  Laterality: N/A;    HERNIA REPAIR      rt inguinal    LUMBAR FUSION      L3,4,5    MULTIPLE TOOTH EXTRACTIONS      pain pump placement  04/08/2010    dilaudid/baclofen    SKIN BIOPSY      SKIN CANCER EXCISION      UPPER GASTROINTESTINAL ENDOSCOPY  10/26/2009    hiatal hernia     Family History   Problem Relation Name Age of Onset    Ulcerative colitis Son      Colon cancer Neg Hx      Colon polyps Neg Hx       Social History     Tobacco Use    Smoking status: Never    Smokeless tobacco: Never   Substance Use Topics    Alcohol use: No    Drug use: No        Review of patient's allergies indicates:  No Known Allergies    Current  Outpatient Medications on File Prior to Visit   Medication Sig Dispense Refill    acetaminophen (TYLENOL) 500 MG tablet Take 1 tablet by mouth 4 (four) times daily as needed.      albuterol (PROVENTIL) 5 mg/mL nebulizer solution Take 2.5 mg by nebulization every 6 (six) hours as needed for Wheezing.      aspirin 81 MG Chew 1 tablet.      budesonide-formoterol 160-4.5 mcg (SYMBICORT) 160-4.5 mcg/actuation HFAA 2 puffs Inhalation Twice a day      camphor-menthol 0.5-0.5% (SARNA) lotion APPLY LIBERAL AMOUNT TOPICALLY  AS NEEDED FOR ITCHING      cholecalciferol, vitamin D3, (VITAMIN D3) 25 mcg (1,000 unit) capsule Take 1,000 Units by mouth.      cholestyramine, with sugar, 4 gram Powd Take 4 g by mouth 2 (two) times a day.      clopidogreL (PLAVIX) 75 mg tablet 75 mg.      CYANOCOBALAMIN, VITAMIN B-12, (VITAMIN B-12 ORAL) Take by mouth.      EScitalopram oxalate (LEXAPRO) 10 MG tablet Take 1 tablet by mouth once daily.      famotidine (PEPCID) 40 MG tablet 40 mg.      fluorouraciL (EFUDEX) 5 % cream Apply topically.      fluticasone-salmeterol diskus inhaler 250-50 mcg       folic acid (FOLVITE) 1 MG tablet Take 1 mg by mouth once daily.      intrathecal pain pump compound 1 mL by Intrathecal route once. Medtronic pump  400mcg/ml   100mcg/ml  194.51mcg dilaudid/day  48.63mcg baclofen/day  Refill q71 days      ketoconazole (NIZORAL) 2 % cream APPLY SMALL AMOUNT TOPICALLY ONCE DAILY FOR FUNGAL INFECTION      latanoprost 0.005 % ophthalmic solution Place 1 drop into both eyes every evening.      lipase-protease-amylase (CREON) 36,000-114,000- 180,000 unit CpDR Take 1 capsule by mouth 3 (three) times daily with meals. 90 capsule 2    montelukast (SINGULAIR) 10 mg tablet 10 mg.      multivitamin (THERAGRAN) per tablet Take 1 tablet by mouth once daily.      mupirocin (BACTROBAN) 2 % ointment Apply topically 3 (three) times daily.      omega 3-dha-epa-fish oil (FISH OIL) 100-160-1,000 mg Cap 1,000 mg.      rosuvastatin  "(CRESTOR) 5 MG tablet Take 5 mg by mouth.      sodium chloride 5% (MARTHA 128) 5 % ophthalmic solution Apply to eye.      tamsulosin (FLOMAX) 0.4 mg Cap tamsulosin 0.4 mg capsule      ammonium lactate (LAC-HYDRIN) 12 % lotion APPLY LOTION TOPICALLY TWICE A DAY FOR DRY SKIN (Patient not taking: Reported on 11/26/2024)      HYDROcodone-acetaminophen (NORCO) 5-325 mg per tablet Take 1 tablet by mouth every 6 (six) hours as needed for Pain. (Patient not taking: Reported on 11/26/2024) 10 tablet 0     No current facility-administered medications on file prior to visit.         OBJECTIVE:     Vital Signs (Most Recent)  Visit Vitals  BP (!) 147/74 (BP Location: Left arm, Patient Position: Sitting)   Pulse 106   Temp 97.1 °F (36.2 °C) (Temporal)   Resp 16   Ht 5' 8" (1.727 m)   Wt 77.4 kg (170 lb 10.2 oz)   SpO2 96%   BMI 25.95 kg/m²     5' 8" (1.727 m)  77.4 kg (170 lb 10.2 oz)     Physical Exam    General: No acute distress. Well-developed. Well-nourished.  Eyes: Sclerae anicteric. Normal conjunctivae.  HENT: Normocephalic. Atraumatic.  Cardiovascular: Normal S1, S2. Regular rate. Regular rhythm.  Respiratory: Normal respiratory effort. Clear to auscultation bilaterally. Bibasilar crackles. No rhonchi. No wheezing.  Musculoskeletal: Right arm in sling. No  obvious deformity otherwise.  Extremities: No lower extremity edema. No clubbing.  Neurological: Awake. Alert. No slurred speech.    TEST RESULTS:  - CT Abdomen: 11/22/24, personally reviewed and independently interpreted; Multiple nodules in the bilateral lower lobes, irregular with spiculated appearance. Highly concerning for malignancy. Nodules also incidentally noted in the pancreas. Chronic calcified pleural plaques are also noted.  CT Chest: 2019, pleural plaques noted, consistent with asbestos exposure.  CT Chest: Earlier than 2019, nodules seen in left lung.           Diagnostic Results:    Echocardiogram  No results found for this or any previous visit.    Most " Recent Nuclear Stress Test Results  No results found for this or any previous visit.    Most Recent Cardiac PET Stress Test Results  No results found for this or any previous visit.    Most Recent Cardiovascular Angiogram  No results found for this or any previous visit.    SpO2: 96 %        Test(s) Reviewed  I have personally reviewed the following in detail:  [] Chest Xray Images   [x] CT Images   [] Echocardiogram   [] Labs   [] Other:       Assessment:         ICD-10-CM ICD-9-CM   1. Abnormal CT scan  R93.89 793.99        Plan:   Assessment & Plan    Reviewed patient's history of asbestos exposure and prior CT findings showing pleural plaques  Noted new findings on recent CT abdomen showing abnormalities in the lower lungs  Planned full CT chest to evaluate entire lungs at higher resolution for potential biopsy  Considered bronchoscopy with robotic guidance for biopsy based on CT findings  Ordered PET scan to assess metabolic activity of lung abnormalities  Coordinating with cardiology regarding management of anticoagulants for potential biopsy, given patient's history of cardiac stent  Will review case at multidisciplinary tumor board to finalize management plan    PLEURAL PLAQUE AND ASBESTOS EXPOSURE:  - Explained significance of pleural plaques as markers of asbestos exposure.    LUNG ABNORMALITIES:  - Discussed need for further imaging to fully evaluate lung abnormalities.  - Outlined potential biopsy approaches and the risks, benefits, and alternatives to robotic bronchoscopy  - Dedicated CT chest ordered  - PET scan ordered.    CORONARY ARTERY DISEASE:  - Continued Plavix for cardiac stent.    FOLLOW-UP:  - Will contact with any updates to plan and arrange for biopsy after imaging results reviewed.  - Follow up to schedule further imaging and potential biopsy.           Abnormal CT scan  -     Ambulatory referral/consult to Pulmonology  -     CT Chest Without Contrast; Future; Expected date: 11/26/2024  -      NM PET CT FDG Skull Base to Mid Thigh; Future; Expected date: 11/26/2024         See labs and med orders.    Medications have been reviewed and reconciled.      Portions of this note may have been created with voice recognition software.  Grammatical, syntax and spelling errors may be inevitable.    This note was generated with the assistance of ambient listening technology. Verbal consent was obtained by the patient and accompanying visitor(s) for the recording of patient appointment to facilitate this note. I attest to having reviewed and edited the generated note for accuracy, though some syntax or spelling errors may persist. Please contact the author of this note for any clarification.

## 2024-11-26 NOTE — NURSING
Met with patient in pulmonary clinic today.  Explained my role as navigator.  Reviewed plan of care and answered questions.    Plan is for CT chest and PET scan.   My contact information was provided and pt was encouraged to call with any questions or concerns.  Pt verbalized understanding.

## 2024-11-27 ENCOUNTER — HOSPITAL ENCOUNTER (OUTPATIENT)
Dept: RADIOLOGY | Facility: HOSPITAL | Age: 84
Discharge: HOME | End: 2024-11-27
Attending: ORTHOPAEDIC SURGERY
Payer: MEDICARE

## 2024-11-27 ENCOUNTER — HOSPITAL ENCOUNTER (OUTPATIENT)
Dept: RADIOLOGY | Facility: HOSPITAL | Age: 84
Discharge: HOME | End: 2024-11-27
Attending: INTERNAL MEDICINE
Payer: MEDICARE

## 2024-11-27 ENCOUNTER — OFFICE VISIT (OUTPATIENT)
Dept: ORTHOPEDICS | Facility: CLINIC | Age: 84
End: 2024-11-27
Payer: MEDICARE

## 2024-11-27 DIAGNOSIS — S52.571A OTHER CLOSED INTRA-ARTICULAR FRACTURE OF DISTAL END OF RIGHT RADIUS, INITIAL ENCOUNTER: ICD-10-CM

## 2024-11-27 DIAGNOSIS — R93.89 ABNORMAL CT SCAN: ICD-10-CM

## 2024-11-27 LAB — GLUCOSE SERPL-MCNC: 96 MG/DL (ref 70–110)

## 2024-11-27 PROCEDURE — 71250 CT THORAX DX C-: CPT | Mod: 26,,, | Performed by: STUDENT IN AN ORGANIZED HEALTH CARE EDUCATION/TRAINING PROGRAM

## 2024-11-27 PROCEDURE — 78815 PET IMAGE W/CT SKULL-THIGH: CPT | Mod: TC,PN

## 2024-11-27 PROCEDURE — 99214 OFFICE O/P EST MOD 30 MIN: CPT | Mod: S$PBB,,, | Performed by: ORTHOPAEDIC SURGERY

## 2024-11-27 PROCEDURE — A9552 F18 FDG: HCPCS | Mod: PN | Performed by: INTERNAL MEDICINE

## 2024-11-27 PROCEDURE — 99999 PR PBB SHADOW E&M-EST. PATIENT-LVL III: CPT | Mod: PBBFAC,,, | Performed by: ORTHOPAEDIC SURGERY

## 2024-11-27 PROCEDURE — 71250 CT THORAX DX C-: CPT | Mod: TC,PO

## 2024-11-27 PROCEDURE — 99213 OFFICE O/P EST LOW 20 MIN: CPT | Mod: PBBFAC,25 | Performed by: ORTHOPAEDIC SURGERY

## 2024-11-27 PROCEDURE — 73110 X-RAY EXAM OF WRIST: CPT | Mod: 26,RT,, | Performed by: RADIOLOGY

## 2024-11-27 PROCEDURE — 73110 X-RAY EXAM OF WRIST: CPT | Mod: TC,RT

## 2024-11-27 RX ORDER — FLUDEOXYGLUCOSE F18 500 MCI/ML
12 INJECTION INTRAVENOUS
Status: COMPLETED | OUTPATIENT
Start: 2024-11-27 | End: 2024-11-27

## 2024-11-27 RX ADMIN — FLUDEOXYGLUCOSE F-18 12.5 MILLICURIE: 500 INJECTION INTRAVENOUS at 12:11

## 2024-11-27 NOTE — PROGRESS NOTES
[facility]      THORACIC MULTIDISCIPLINARY TUMOR BOARD  PATIENT REVIEW FORM  ___________________________________________________________________    CLINIC #: [id number]  DATE: 2024    TUMOR SITE:   Cancer Type: Other (lung mass)     Cancer Site: lower lobe     Tumor Laterality: Left     Metastatic Site(s): Other     Other Metastatic Site: pancreas     Cancer Staging Complete: No       Presenting Hospital / Clinic: [facility]     Virtual Tumor Board Conference: In person       PRESENTER:   Presenter: Dr. White     Specialties Present: Medical Oncology; Hematology; Radiation Oncology; Surgical Oncology; Pathology; Navigation; Research; Integrative Oncology; Radiology; Pulmonology       PATIENT SUMMARY:   presents for evaluation of new lung findings on a recent CT of the abdomen, which was originally ordered for ulcerative colitis follow-up.     HPI:  Mr. Olson underwent a CT of the abdomen for ulcerative colitis follow-up, which incidentally revealed new findings in the lower portion of the lungs. 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 CT of the chest from 2019 shows pleural-based nodules with calcifications consistent with asbestos-related pleural plaques.     Mr. Olson has ulcerative colitis managed with Entyvio infusions at the clinic. He also reports prior diagnoses of COPD and asthma, controlled with an inhaler. His cardiac history includes one stent placed 2-3 years ago, for which he takes Plavix. [facility] and has an upcoming [facility] next month.    Mr. Olson quit smoking in  and denies any personal history of cancer.    FAMILY HISTORY:  - Sister: Liver cancer, recently      SURGICAL HISTORY:  - Coronary artery stent: 2-3 years ago     SOCIAL  HISTORY:  - Smoking: Quit in 1983, previously smoked up to 2 packs a day  - Occupation: Worked in [employer]  -  History:  who received care at [facility]     Background Information  Patient Status: a new patient  Reason for Consultation: Initial Presentation  Treatment to Date: None         BOARD RECOMMENDATIONS:   Recommended Plan (General)  Recommended Plan: Imaging; Biopsy  Recommended Plan Note: PET scan, lung biopsy, and MRCP. if unable to receive contrast, then CT scan pancreatic protocol instead of MRCP.         CONSULT NEEDED:     [] Surgery    [] Hem/Onc    [] Rad/Onc    [] Dietary                 [] Social Service    [] Psychology       [] Pulmonology    Cancer Staging   No matching staging information was found for the patient.          [x] [facility] Treatment Guidelines reviewed and care planned is consistent with guidelines.         (i.e., NCCN, NCI, PD, ACO, AUA, etc.)    PRESENTATION AT CANCER CONFERENCE:   Presentation at Cancer Conference: Prospective       CLINICAL TRIAL ELIGIBILITY:   Clinical Trial Eligibility  Clinical Trial Eligibility: Not discussed         Erin MARGARITA Gardner

## 2024-11-27 NOTE — ASSESSMENT & PLAN NOTE
The patient and I talked at length about the natural history and pathophysiology of right distal radius fracture which has displaced further, he understands that this is a chronic problem which may have acute episodic exacerbations.   Symptoms may resolve, worsen and even become permanent.  The patient understands the treatment options including observation, activity modification, therapy, NSAIDs, splints, injections and the surgical options including volar plate fixation.  We discussed the risks of the diagnosis and the treatment options including pain, infection, bleeding, damage to nerves and vessels, stiffness, scarring, incomplete relief or recurrence of symptoms, poor pain and functional outcomes.  Unique risks of this diagnosis and the treatment include risk of displacement and need for additional surgery.   The patient has elected to proceed with nonoperative treatment and we will follow up next week with x-rays.  He will continue with a short-arm fiberglass cast.  He understands the risk of displacement with lifting pushing or pulling.  He understands to keep the cast clean and dry we will check him next week he understands he may need a surgery if his fracture displaces further.  Again I showed him in his son his x-rays in the progressive displacement.  He understands it was displaced even further and has near 60° of dorsal angulation.  Patient has a lot of other things going on and was just diagnosed with a left numb lung nodule which he has a several scans scheduled today he would like to continue with nonoperative treatment he understands it will heal cricket and he may benefit from a malunion repair down the road.  I will see him back in 4 weeks we will exchange his cast for a removable splint

## 2024-11-27 NOTE — PROGRESS NOTES
PET Imaging Questionnaire    Are you a Diabetic? Recent Blood Sugar level? No    Are you anemic? Bone Marrow Stimulation Meds? No    Have you had a CT Scan, if so when & where was your last one? Yes -     Have you had a PET Scan, if so when & where was your last one? No    Chemotherapy or currently on Chemotherapy? No    Radiation therapy? No    Surgical History:   Past Surgical History:   Procedure Laterality Date    BACK SURGERY      CATARACT EXTRACTION W/ INTRAOCULAR LENS IMPLANT Right     COLONOSCOPY  2014    COLONOSCOPY  03/25/2015    diverticulosis, otherwise normal findings, biopsies taken from 4 quadrants- see media section for biopsy report, repeat in 1 year    COLONOSCOPY N/A 6/1/2016    Procedure: COLONOSCOPY;  Surgeon: Ravindra Ervin MD;  Location: [facility];  Service: Endoscopy;  Laterality: N/A;    COLONOSCOPY N/A 9/1/2022    Procedure: COLONOSCOPY;  Surgeon: Lencho Ivory MD;  Location: [facility];  Service: Endoscopy;  Laterality: N/A;    COLONOSCOPY N/A 8/18/2023    Procedure: COLONOSCOPY;  Surgeon: Lencho Ivory MD;  Location: [facility];  Service: Endoscopy;  Laterality: N/A;    COLONOSCOPY N/A 7/25/2024    Procedure: COLONOSCOPY;  Surgeon: Ravindra Ervin MD;  Location: [facility];  Service: Gastroenterology;  Laterality: N/A;    HERNIA REPAIR      rt inguinal    LUMBAR FUSION      L3,4,5    MULTIPLE TOOTH EXTRACTIONS      pain pump placement  04/08/2010    dilaudid/baclofen    SKIN BIOPSY      SKIN CANCER EXCISION      UPPER GASTROINTESTINAL ENDOSCOPY  10/26/2009    hiatal hernia        Have you been fasting for at least 6 hours? Yes    Is there any chance you may be pregnant or breastfeeding? No    Assay: 14.7 MCi@:1244   Injection Site:lt ac    Residual: 2.22 mCi@: 1246   Technologist: Sherry Ricketts Injected:12.5mCi

## 2024-11-27 NOTE — PROGRESS NOTES
ALDEN Shea M.D.  Orthopaedic Hand and Wrist Surgery  [address]    Patient ID: Alfie Olson  YOB: 1940  MRN: [id number]    Date of Injury:  2024     Diagnosis:   Displaced right distal radius fracture    History of Present Illness: Alfie Olson is a 84 y.o. male here for follow up of his displaced right distal radius fracture.  He is here today with his son.  He was diagnosed with a left lung nodule of which he has several scans scheduled for today.  Patient reports she still has wrist pain as expected.  He again is not interested in any surgery    Patient was queried and this is the extent of the patients current complaints today.    Physical Exam:   Skin:  No abrasions from the cast  Sensation:  Intact in the fingertips  Motor:  Intact EPL FPL and FDP to all fingers  Swelling:  Minimal hand swelling  No extensor lag    Imaging:  X-rays reveal further displacement of both the intra-articular component as well as the dorsal angulation this was showed to the patient and the son near 60° of dorsal angulation    Provider Note/Medical Decision Makin. Other closed intra-articular fracture of distal end of right radius, initial encounter  Assessment & Plan:  The patient and I talked at length about the natural history and pathophysiology of right distal radius fracture which has displaced further, he understands that this is a chronic problem which may have acute episodic exacerbations.   Symptoms may resolve, worsen and even become permanent.  The patient understands the treatment options including observation, activity modification, therapy, NSAIDs, splints, injections and the surgical options including volar plate fixation.  We discussed the risks of the diagnosis and the treatment options including pain, infection, bleeding, damage to nerves and vessels, stiffness, scarring, incomplete relief or recurrence of symptoms, poor pain and functional  Therapy Discontinuation Recommended - No indication outcomes.  Unique risks of this diagnosis and the treatment include risk of displacement and need for additional surgery.   The patient has elected to proceed with nonoperative treatment and we will follow up next week with x-rays.  He will continue with a short-arm fiberglass cast.  He understands the risk of displacement with lifting pushing or pulling.  He understands to keep the cast clean and dry we will check him next week he understands he may need a surgery if his fracture displaces further.  Again I showed him in his son his x-rays in the progressive displacement.  He understands it was displaced even further and has near 60° of dorsal angulation.  Patient has a lot of other things going on and was just diagnosed with a left numb lung nodule which he has a several scans scheduled today he would like to continue with nonoperative treatment he understands it will heal cricket and he may benefit from a malunion repair down the road.  I will see him back in 4 weeks we will exchange his cast for a removable splint      Orders:  -     X-Ray Wrist Complete Right; Future; Expected date: 11/27/2024         I discussed worrisome and red flag signs and symptoms with the patient. The patient expressed understanding and agreed to alert me immediately or to go to the emergency room if they experience any of these.   Treatment plan was developed with input from the patient/family, and they expressed understanding and agreement with the plan. All questions were answered today.    There are no Patient Instructions on file for this visit.    ALDEN Shea M.D.  [facility] Department of Orthopedic Surgery  Orthopedic Hand and Wrist Surgeon    [facility] Hand Specialist  Dr. Yon Shea   [employer] Review   [employer]   [employer]     Disclaimer: This note was prepared using a voice recognition system and is likely to have sound alike errors within the text.

## 2024-11-29 ENCOUNTER — HOSPITAL ENCOUNTER (OUTPATIENT)
Dept: RADIOLOGY | Facility: HOSPITAL | Age: 84
Discharge: HOME | End: 2024-11-29
Payer: MEDICARE

## 2024-11-29 ENCOUNTER — TELEPHONE (OUTPATIENT)
Facility: CLINIC | Age: 84
End: 2024-11-29
Payer: MEDICARE

## 2024-11-29 DIAGNOSIS — K80.20 GALLSTONES: ICD-10-CM

## 2024-11-29 PROCEDURE — 76705 ECHO EXAM OF ABDOMEN: CPT | Mod: 26,,, | Performed by: RADIOLOGY

## 2024-11-29 PROCEDURE — 76705 ECHO EXAM OF ABDOMEN: CPT | Mod: TC,PO

## 2024-12-02 DIAGNOSIS — R93.2 ABNORMAL LIVER ULTRASOUND: Primary | ICD-10-CM

## 2024-12-04 ENCOUNTER — TELEPHONE (OUTPATIENT)
Dept: GASTROENTEROLOGY | Facility: CLINIC | Age: 84
End: 2024-12-04
Payer: MEDICARE

## 2024-12-04 NOTE — TELEPHONE ENCOUNTER
[facility] [place] [facility] as well as for biopsy- fax number [phone]

## 2024-12-05 DIAGNOSIS — K86.89 PANCREATIC INSUFFICIENCY: ICD-10-CM

## 2024-12-05 RX ORDER — PANCRELIPASE 36000; 180000; 114000 [USP'U]/1; [USP'U]/1; [USP'U]/1
1 CAPSULE, DELAYED RELEASE PELLETS ORAL
Qty: 90 CAPSULE | Refills: 2 | Status: SHIPPED | OUTPATIENT
Start: 2024-12-05 | End: 2025-03-05

## 2024-12-06 ENCOUNTER — TELEPHONE (OUTPATIENT)
Dept: GASTROENTEROLOGY | Facility: CLINIC | Age: 84
End: 2024-12-06
Payer: MEDICARE

## 2024-12-13 DIAGNOSIS — Z01.89 ENCOUNTER FOR OTHER SPECIFIED SPECIAL EXAMINATIONS: Primary | ICD-10-CM

## 2024-12-18 ENCOUNTER — INFUSION (OUTPATIENT)
Dept: INFUSION THERAPY | Facility: HOSPITAL | Age: 84
End: 2024-12-18
Attending: INTERNAL MEDICINE
Payer: MEDICARE

## 2024-12-18 VITALS
DIASTOLIC BLOOD PRESSURE: 62 MMHG | RESPIRATION RATE: 19 BRPM | SYSTOLIC BLOOD PRESSURE: 122 MMHG | TEMPERATURE: 98 F | HEART RATE: 72 BPM | WEIGHT: 170.19 LBS | HEIGHT: 68 IN | BODY MASS INDEX: 25.79 KG/M2

## 2024-12-18 DIAGNOSIS — K51.018 ULCERATIVE PANCOLITIS WITH OTHER COMPLICATION: Primary | ICD-10-CM

## 2024-12-18 PROCEDURE — 63600175 PHARM REV CODE 636 W HCPCS: Mod: JZ,JG,PN | Performed by: INTERNAL MEDICINE

## 2024-12-18 PROCEDURE — 25000003 PHARM REV CODE 250: Mod: PN | Performed by: INTERNAL MEDICINE

## 2024-12-18 PROCEDURE — 96365 THER/PROPH/DIAG IV INF INIT: CPT | Mod: PN

## 2024-12-18 RX ORDER — SODIUM CHLORIDE 0.9 % (FLUSH) 0.9 %
10 SYRINGE (ML) INJECTION
Status: DISCONTINUED | OUTPATIENT
Start: 2024-12-18 | End: 2024-12-18 | Stop reason: HOSPADM

## 2024-12-18 RX ORDER — ACETAMINOPHEN 325 MG/1
650 TABLET ORAL
Status: ACTIVE | OUTPATIENT
Start: 2024-12-18 | End: 2024-12-18

## 2024-12-18 RX ORDER — METHYLPREDNISOLONE SOD SUCC 125 MG
40 VIAL (EA) INJECTION
OUTPATIENT
Start: 2025-01-15

## 2024-12-18 RX ORDER — DIPHENHYDRAMINE HYDROCHLORIDE 50 MG/ML
25 INJECTION INTRAMUSCULAR; INTRAVENOUS
OUTPATIENT
Start: 2025-01-15

## 2024-12-18 RX ORDER — EPINEPHRINE 0.3 MG/.3ML
0.3 INJECTION SUBCUTANEOUS
Status: ACTIVE | OUTPATIENT
Start: 2024-12-18 | End: 2024-12-18

## 2024-12-18 RX ORDER — DIPHENHYDRAMINE HYDROCHLORIDE 50 MG/ML
25 INJECTION INTRAMUSCULAR; INTRAVENOUS
Status: ACTIVE | OUTPATIENT
Start: 2024-12-18 | End: 2024-12-18

## 2024-12-18 RX ORDER — SODIUM CHLORIDE 0.9 % (FLUSH) 0.9 %
10 SYRINGE (ML) INJECTION
OUTPATIENT
Start: 2025-01-15

## 2024-12-18 RX ORDER — ACETAMINOPHEN 325 MG/1
650 TABLET ORAL
OUTPATIENT
Start: 2025-01-15

## 2024-12-18 RX ORDER — HEPARIN 100 UNIT/ML
500 SYRINGE INTRAVENOUS
OUTPATIENT
Start: 2025-01-15

## 2024-12-18 RX ORDER — IPRATROPIUM BROMIDE AND ALBUTEROL SULFATE 2.5; .5 MG/3ML; MG/3ML
3 SOLUTION RESPIRATORY (INHALATION)
OUTPATIENT
Start: 2025-01-15

## 2024-12-18 RX ORDER — EPINEPHRINE 1 MG/ML
0.3 INJECTION, SOLUTION, CONCENTRATE INTRAVENOUS
OUTPATIENT
Start: 2025-01-15

## 2024-12-18 RX ORDER — HEPARIN 100 UNIT/ML
500 SYRINGE INTRAVENOUS
Status: DISCONTINUED | OUTPATIENT
Start: 2024-12-18 | End: 2024-12-18 | Stop reason: HOSPADM

## 2024-12-18 RX ORDER — IPRATROPIUM BROMIDE AND ALBUTEROL SULFATE 2.5; .5 MG/3ML; MG/3ML
3 SOLUTION RESPIRATORY (INHALATION)
Status: DISCONTINUED | OUTPATIENT
Start: 2024-12-18 | End: 2024-12-18

## 2024-12-18 RX ADMIN — VEDOLIZUMAB 300 MG: 300 INJECTION, POWDER, LYOPHILIZED, FOR SOLUTION INTRAVENOUS at 02:12

## 2024-12-18 RX ADMIN — SODIUM CHLORIDE: 9 INJECTION, SOLUTION INTRAVENOUS at 01:12

## 2024-12-18 NOTE — PLAN OF CARE
Problem: Adult Inpatient Plan of Care  Goal: Plan of Care Review  12/18/2024 1505 by Yasmine Huffman RN  Outcome: Progressing  Flowsheets (Taken 12/18/2024 1505)  Plan of Care Reviewed With: patient  12/18/2024 1504 by Yasmine Huffman RN  Outcome: Progressing  Goal: Patient-Specific Goal (Individualized)  12/18/2024 1505 by Yasmine Huffman RN  Outcome: Progressing  Flowsheets (Taken 12/18/2024 1505)  Individualized Care Needs: recliner, TV, conversation  Anxieties, Fears or Concerns: none  12/18/2024 1504 by Yasmine Huffman RN  Outcome: Progressing  Goal: Absence of Hospital-Acquired Illness or Injury  12/18/2024 1505 by Yasmine Huffman RN  Outcome: Progressing  12/18/2024 1504 by Yasmine Huffman RN  Outcome: Progressing  Goal: Optimal Comfort and Wellbeing  12/18/2024 1505 by Yasmine Huffman RN  Outcome: Progressing  12/18/2024 1504 by Yasmine Huffman RN  Outcome: Progressing  Goal: Readiness for Transition of Care  12/18/2024 1505 by Yasmine Huffman RN  Outcome: Progressing  12/18/2024 1504 by Yasmine Huffman RN  Outcome: Progressing     Problem: Fatigue  Goal: Improved Activity Tolerance  Outcome: Progressing  Intervention: Promote Improved Energy  Flowsheets (Taken 12/18/2024 1505)  Fatigue Management: paced activity encouraged  Sleep/Rest Enhancement:   natural light exposure provided   relaxation techniques promoted   noise level reduced   awakenings minimized  Activity Management:   Ambulated -L4   Ambulated to bathroom - L4   Up in chair - L3  Environmental Support: environmental consistency promoted   Pt. tolerated infusion well. No adverse reactions noted. VS stable. Future appointments discussed, NAD noted. Pt. discharged to home, no assistance needed.

## 2024-12-20 ENCOUNTER — TELEPHONE (OUTPATIENT)
Dept: HEMATOLOGY/ONCOLOGY | Facility: CLINIC | Age: 84
End: 2024-12-20
Payer: OTHER GOVERNMENT

## 2024-12-20 ENCOUNTER — DOCUMENTATION ONLY (OUTPATIENT)
Dept: HEMATOLOGY/ONCOLOGY | Facility: CLINIC | Age: 84
End: 2024-12-20
Payer: OTHER GOVERNMENT

## 2024-12-20 DIAGNOSIS — C26.9 GI CANCER: Primary | ICD-10-CM

## 2024-12-20 DIAGNOSIS — C25.9 PANCREATIC CANCER: Primary | ICD-10-CM

## 2024-12-20 NOTE — NURSING
Received pathology from lung nodule clinic, possible pancreatic. Spoke with Dr. Dunne, she would like to get a EUS. Sent EUS request to Dr. Ni's office for scheduling, reviewed records. Will continue to follow for scheduling.

## 2024-12-20 NOTE — NURSING
Spoke with patient, scheduled with Dr. Dunne 12/26, pt verbalized agreement to time/date/location. Discuss with patient that referrals were placed for EUS with Dr. Ni and Port placement with general surgery, they will be reaching out to the patient to schedule.  Patient verbalized understanding. All questions and concerns addressed at this time.  Will continue to follow.

## 2024-12-23 ENCOUNTER — DOCUMENTATION ONLY (OUTPATIENT)
Dept: HEMATOLOGY/ONCOLOGY | Facility: CLINIC | Age: 84
End: 2024-12-23
Payer: OTHER GOVERNMENT

## 2024-12-23 ENCOUNTER — TELEPHONE (OUTPATIENT)
Dept: GASTROENTEROLOGY | Facility: CLINIC | Age: 84
End: 2024-12-23
Payer: OTHER GOVERNMENT

## 2024-12-23 NOTE — TELEPHONE ENCOUNTER
----- Message from DEBBIE Cabello sent at 12/23/2024  8:33 AM CST -----  Regarding: EUS referral  Good morning,    Dr. Dunne is referring the attached patient to have a EUS at [facility].  Can you please reach out to the patient to schedule.  He was thought to have lung cancer but is now looking more like pancreatic as the primary. Please advise of the date so I can follow.       Thank you,    Destiney Mcdonnell, RN, BSN  RN Oncology Navigator  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 Will likely need referral to Med Onc only. PET done, MRI brain ordered.     VA [facility] to [facility] for auth for [facility].

## 2024-12-23 NOTE — NURSING
12/23 sent to [facility] for EUS pt did not want to schedule with Dr. Ni he wants to use his [facility] insurance which is not accepted at Dr. Ni's office. Staff message sent to darshan severino to schedule appointment asap.  Will continue to follow for scheduling.

## 2024-12-24 ENCOUNTER — OFFICE VISIT (OUTPATIENT)
Dept: SURGERY | Facility: CLINIC | Age: 84
End: 2024-12-24
Payer: MEDICARE

## 2024-12-24 VITALS
DIASTOLIC BLOOD PRESSURE: 63 MMHG | BODY MASS INDEX: 26.21 KG/M2 | WEIGHT: 172.38 LBS | HEART RATE: 86 BPM | SYSTOLIC BLOOD PRESSURE: 136 MMHG

## 2024-12-24 DIAGNOSIS — Z45.2 ENCOUNTER FOR INSERTION OF VENOUS ACCESS PORT: ICD-10-CM

## 2024-12-24 DIAGNOSIS — C25.9 PANCREATIC CANCER: Primary | ICD-10-CM

## 2024-12-24 PROCEDURE — 99999 PR PBB SHADOW E&M-EST. PATIENT-LVL III: CPT | Mod: PBBFAC,,, | Performed by: STUDENT IN AN ORGANIZED HEALTH CARE EDUCATION/TRAINING PROGRAM

## 2024-12-24 PROCEDURE — 99213 OFFICE O/P EST LOW 20 MIN: CPT | Mod: PBBFAC,PO | Performed by: STUDENT IN AN ORGANIZED HEALTH CARE EDUCATION/TRAINING PROGRAM

## 2024-12-24 PROCEDURE — 99203 OFFICE O/P NEW LOW 30 MIN: CPT | Mod: S$PBB,,, | Performed by: STUDENT IN AN ORGANIZED HEALTH CARE EDUCATION/TRAINING PROGRAM

## 2024-12-24 NOTE — PROGRESS NOTES
History & Physical    Subjective     History of Present Illness:  Patient is a 84 y.o. male presents with adenocarcinoma throughout both lung fields.  He was referred to me for a port.  Patient is on Plavix for remote history of cardiac stent.  Chief Complaint   Patient presents with    Consult       Review of patient's allergies indicates:  No Known Allergies    Current Outpatient Medications   Medication Sig Dispense Refill    acetaminophen (TYLENOL) 500 MG tablet Take 1 tablet by mouth 4 (four) times daily as needed.      albuterol (PROVENTIL) 5 mg/mL nebulizer solution Take 2.5 mg by nebulization every 6 (six) hours as needed for Wheezing.      budesonide-formoterol 160-4.5 mcg (SYMBICORT) 160-4.5 mcg/actuation HFAA Inhale 2 puffs into the lungs every 12 (twelve) hours.      cholecalciferol, vitamin D3, (VITAMIN D3) 25 mcg (1,000 unit) capsule Take 1,000 Units by mouth.      cholestyramine, with sugar, 4 gram Powd Take 4 g by mouth 2 (two) times a day.      clopidogreL (PLAVIX) 75 mg tablet 75 mg.      CYANOCOBALAMIN, VITAMIN B-12, (VITAMIN B-12 ORAL) Take by mouth.      famotidine (PEPCID) 40 MG tablet Take 40 mg by mouth nightly as needed.      fluorouraciL (EFUDEX) 5 % cream Apply topically.      fluticasone-salmeterol diskus inhaler 250-50 mcg Inhale 1 puff into the lungs Daily.      folic acid (FOLVITE) 1 MG tablet Take 1 mg by mouth once daily.      intrathecal pain pump compound 1 mL by Intrathecal route once. Medtronic pump  400mcg/ml   100mcg/ml  194.51mcg dilaudid/day  48.63mcg baclofen/day  Refill q71 days      ketoconazole (NIZORAL) 2 % cream       latanoprost 0.005 % ophthalmic solution Place 1 drop into both eyes every evening.      lipase-protease-amylase (CREON) 36,000-114,000- 180,000 unit CpDR Take 1 capsule by mouth 3 (three) times daily with meals. 90 capsule 2    multivitamin (THERAGRAN) per tablet Take 1 tablet by mouth once daily.      rosuvastatin (CRESTOR) 5 MG tablet Take 5 mg by mouth  once daily.      sodium chloride 5% (MARTHA 128) 5 % ophthalmic solution Apply to eye.      tamsulosin (FLOMAX) 0.4 mg Cap Take 0.4 mg by mouth once daily.       No current facility-administered medications for this visit.       Past Medical History:   Diagnosis Date    Anticoagulant long-term use     Arthritis     Asthma     B12 deficiency     Cholelithiasis     Chronic back pain     Chronic diarrhea     Colon polyp     COPD (chronic obstructive pulmonary disease)     Diverticulosis     Full dentures     GERD (gastroesophageal reflux disease)     Glaucoma     left eye    History of fall     Hyperlipemia     JUAN DIEGO (obstructive sleep apnea)     denies CPAP use    Skin cancer     Basal cell    Ulcerative colitis 1961    Vitamin D deficiency     Wrist fracture 11/2024     Past Surgical History:   Procedure Laterality Date    BACK SURGERY      CATARACT EXTRACTION W/ INTRAOCULAR LENS IMPLANT Right     COLONOSCOPY  2014    COLONOSCOPY  03/25/2015    diverticulosis, otherwise normal findings, biopsies taken from 4 quadrants- see media section for biopsy report, repeat in 1 year    COLONOSCOPY N/A 06/01/2016    Procedure: COLONOSCOPY;  Surgeon: Ravindra Ervin MD;  Location: [facility];  Service: Endoscopy;  Laterality: N/A;    COLONOSCOPY N/A 09/01/2022    Procedure: COLONOSCOPY;  Surgeon: Lencho Ivory MD;  Location: [facility];  Service: Endoscopy;  Laterality: N/A;    COLONOSCOPY N/A 08/18/2023    Procedure: COLONOSCOPY;  Surgeon: Lencho Ivory MD;  Location: [facility];  Service: Endoscopy;  Laterality: N/A;    COLONOSCOPY N/A 07/25/2024    Procedure: COLONOSCOPY;  Surgeon: Ravindra Ervin MD;  Location: [facility];  Service: Gastroenterology;  Laterality: N/A;    CORONARY STENT PLACEMENT  2016    HERNIA REPAIR      rt inguinal    LUMBAR FUSION      L3,4,5    MULTIPLE TOOTH EXTRACTIONS      pain pump placement  04/08/2010    dilaudid/baclofen    ROBOTIC BRONCHOSCOPY Bilateral 12/12/2024    Procedure:  ROBOTIC BRONCHOSCOPY;  Surgeon: Gregg White MD;  [facility];  Service: Pulmonary;  Laterality: Bilateral;    SKIN BIOPSY      SKIN CANCER EXCISION      UPPER GASTROINTESTINAL ENDOSCOPY  10/26/2009    hiatal hernia     Family History   Problem Relation Name Age of Onset    Ulcerative colitis Son      Colon cancer Neg Hx      Colon polyps Neg Hx       Social History     Tobacco Use    Smoking status: Never    Smokeless tobacco: Never   Substance Use Topics    Alcohol use: No    Drug use: No        Review of Systems:  Review of Systems   Constitutional: Negative.  Negative for fatigue and fever.   HENT: Negative.     Eyes: Negative.    Respiratory:  Positive for shortness of breath.    Cardiovascular: Negative.  Negative for chest pain.   Gastrointestinal: Negative.    Endocrine: Negative.    Genitourinary: Negative.    Musculoskeletal: Negative.    Skin: Negative.    Allergic/Immunologic: Negative.    Neurological: Negative.    Hematological: Negative.    Psychiatric/Behavioral: Negative.            Objective     Vital Signs (Most Recent)  Pulse: 86 (12/24/24 0802)  BP: 136/63 (12/24/24 0802)     78.2 kg (172 lb 6.4 oz)     Physical Exam:  Physical Exam  Constitutional:       General: He is not in acute distress.     Appearance: Normal appearance. He is not ill-appearing, toxic-appearing or diaphoretic.   HENT:      Head: Normocephalic.      Nose: Nose normal.   Eyes:      Conjunctiva/sclera: Conjunctivae normal.   Cardiovascular:      Rate and Rhythm: Normal rate and regular rhythm.   Pulmonary:      Effort: Pulmonary effort is normal.   Abdominal:      Palpations: Abdomen is soft.   Musculoskeletal:         General: Normal range of motion.      Cervical back: Normal range of motion.   Skin:     General: Skin is warm.   Neurological:      General: No focal deficit present.      Mental Status: He is alert.   Psychiatric:         Mood and Affect: Mood normal.           Diagnostic Results:  PET scan was  reviewed.  Numerous PET avid nodules throughout both lung fields       Assessment and Plan   84-year-old male with adenocarcinoma throughout both lung fields    PLAN:  Risks versus benefits of port placement were discussed.  Consent was obtained for the planned procedure.  Tentative right IJ approach.  Surgery was scheduled for 1/3.

## 2024-12-24 NOTE — PATIENT INSTRUCTIONS
Surgery is scheduled for 1/3/25 arrival time will be given by the the preop nurse.  You may receive two calls from the Preop Nurses one a few days before surgery the second the day before surgery with the arrival time.  The preop nurse will call you from [phone]  Nothing to eat or drink after midnight.  Someone to drive you home if you are same day surgery.    THE PREOP NURSE WILL CALL, SOMETIMES AS LATE AS 4 or 5 PM IN THE AFTERNOON THE DAY BEFORE SURGERY.    Shower the night before and the morning of your procedure with Chlorhexidine Surgical Scrub also known as Hibiclens it can be purchased at most Pharmacy's no prescription needed.    Special Instruction:     Procedure/Surgery is scheduled at  [address]. [place], [place]     Contact Brennen Multani LPN for questions are concerns. [phone]     STOP PLAVIX ON 12/29/24

## 2024-12-24 NOTE — H&P (VIEW-ONLY)
History & Physical    Subjective     History of Present Illness:  Patient is a 84 y.o. male presents with adenocarcinoma throughout both lung fields.  He was referred to me for a port.  Patient is on Plavix for remote history of cardiac stent.  Chief Complaint   Patient presents with    Consult       Review of patient's allergies indicates:  No Known Allergies    Current Outpatient Medications   Medication Sig Dispense Refill    acetaminophen (TYLENOL) 500 MG tablet Take 1 tablet by mouth 4 (four) times daily as needed.      albuterol (PROVENTIL) 5 mg/mL nebulizer solution Take 2.5 mg by nebulization every 6 (six) hours as needed for Wheezing.      budesonide-formoterol 160-4.5 mcg (SYMBICORT) 160-4.5 mcg/actuation HFAA Inhale 2 puffs into the lungs every 12 (twelve) hours.      cholecalciferol, vitamin D3, (VITAMIN D3) 25 mcg (1,000 unit) capsule Take 1,000 Units by mouth.      cholestyramine, with sugar, 4 gram Powd Take 4 g by mouth 2 (two) times a day.      clopidogreL (PLAVIX) 75 mg tablet 75 mg.      CYANOCOBALAMIN, VITAMIN B-12, (VITAMIN B-12 ORAL) Take by mouth.      famotidine (PEPCID) 40 MG tablet Take 40 mg by mouth nightly as needed.      fluorouraciL (EFUDEX) 5 % cream Apply topically.      fluticasone-salmeterol diskus inhaler 250-50 mcg Inhale 1 puff into the lungs Daily.      folic acid (FOLVITE) 1 MG tablet Take 1 mg by mouth once daily.      intrathecal pain pump compound 1 mL by Intrathecal route once. Medtronic pump  400mcg/ml   100mcg/ml  194.51mcg dilaudid/day  48.63mcg baclofen/day  Refill q71 days      ketoconazole (NIZORAL) 2 % cream       latanoprost 0.005 % ophthalmic solution Place 1 drop into both eyes every evening.      lipase-protease-amylase (CREON) 36,000-114,000- 180,000 unit CpDR Take 1 capsule by mouth 3 (three) times daily with meals. 90 capsule 2    multivitamin (THERAGRAN) per tablet Take 1 tablet by mouth once daily.      rosuvastatin (CRESTOR) 5 MG tablet Take 5 mg by mouth  once daily.      sodium chloride 5% (MARTHA 128) 5 % ophthalmic solution Apply to eye.      tamsulosin (FLOMAX) 0.4 mg Cap Take 0.4 mg by mouth once daily.       No current facility-administered medications for this visit.       Past Medical History:   Diagnosis Date    Anticoagulant long-term use     Arthritis     Asthma     B12 deficiency     Cholelithiasis     Chronic back pain     Chronic diarrhea     Colon polyp     COPD (chronic obstructive pulmonary disease)     Diverticulosis     Full dentures     GERD (gastroesophageal reflux disease)     Glaucoma     left eye    History of fall     Hyperlipemia     JUAN DIEGO (obstructive sleep apnea)     denies CPAP use    Skin cancer     Basal cell    Ulcerative colitis 1961    Vitamin D deficiency     Wrist fracture 11/2024     Past Surgical History:   Procedure Laterality Date    BACK SURGERY      CATARACT EXTRACTION W/ INTRAOCULAR LENS IMPLANT Right     COLONOSCOPY  2014    COLONOSCOPY  03/25/2015    diverticulosis, otherwise normal findings, biopsies taken from 4 quadrants- see media section for biopsy report, repeat in 1 year    COLONOSCOPY N/A 06/01/2016    Procedure: COLONOSCOPY;  Surgeon: Ravindra Ervin MD;  Location: [facility];  Service: Endoscopy;  Laterality: N/A;    COLONOSCOPY N/A 09/01/2022    Procedure: COLONOSCOPY;  Surgeon: Lencho Ivory MD;  Location: [facility];  Service: Endoscopy;  Laterality: N/A;    COLONOSCOPY N/A 08/18/2023    Procedure: COLONOSCOPY;  Surgeon: Lencho Ivory MD;  Location: [facility];  Service: Endoscopy;  Laterality: N/A;    COLONOSCOPY N/A 07/25/2024    Procedure: COLONOSCOPY;  Surgeon: Ravindra Ervin MD;  Location: [facility];  Service: Gastroenterology;  Laterality: N/A;    CORONARY STENT PLACEMENT  2016    HERNIA REPAIR      rt inguinal    LUMBAR FUSION      L3,4,5    MULTIPLE TOOTH EXTRACTIONS      pain pump placement  04/08/2010    dilaudid/baclofen    ROBOTIC BRONCHOSCOPY Bilateral 12/12/2024    Procedure:  ROBOTIC BRONCHOSCOPY;  Surgeon: Gregg White MD;  Location: [facility] OR;  Service: Pulmonary;  Laterality: Bilateral;    SKIN BIOPSY      SKIN CANCER EXCISION      UPPER GASTROINTESTINAL ENDOSCOPY  10/26/2009    hiatal hernia     Family History   Problem Relation Name Age of Onset    Ulcerative colitis Son      Colon cancer Neg Hx      Colon polyps Neg Hx       Social History     Tobacco Use    Smoking status: Never    Smokeless tobacco: Never   Substance Use Topics    Alcohol use: No    Drug use: No        Review of Systems:  Review of Systems   Constitutional: Negative.  Negative for fatigue and fever.   HENT: Negative.     Eyes: Negative.    Respiratory:  Positive for shortness of breath.    Cardiovascular: Negative.  Negative for chest pain.   Gastrointestinal: Negative.    Endocrine: Negative.    Genitourinary: Negative.    Musculoskeletal: Negative.    Skin: Negative.    Allergic/Immunologic: Negative.    Neurological: Negative.    Hematological: Negative.    Psychiatric/Behavioral: Negative.            Objective     Vital Signs (Most Recent)  Pulse: 86 (12/24/24 0802)  BP: 136/63 (12/24/24 0802)     78.2 kg (172 lb 6.4 oz)     Physical Exam:  Physical Exam  Constitutional:       General: He is not in acute distress.     Appearance: Normal appearance. He is not ill-appearing, toxic-appearing or diaphoretic.   HENT:      Head: Normocephalic.      Nose: Nose normal.   Eyes:      Conjunctiva/sclera: Conjunctivae normal.   Cardiovascular:      Rate and Rhythm: Normal rate and regular rhythm.   Pulmonary:      Effort: Pulmonary effort is normal.   Abdominal:      Palpations: Abdomen is soft.   Musculoskeletal:         General: Normal range of motion.      Cervical back: Normal range of motion.   Skin:     General: Skin is warm.   Neurological:      General: No focal deficit present.      Mental Status: He is alert.   Psychiatric:         Mood and Affect: Mood normal.           Diagnostic Results:  PET scan was  reviewed.  Numerous PET avid nodules throughout both lung fields       Assessment and Plan   84-year-old male with adenocarcinoma throughout both lung fields    PLAN:  Risks versus benefits of port placement were discussed.  Consent was obtained for the planned procedure.  Tentative right IJ approach.  Surgery was scheduled for 1/3.

## 2024-12-26 ENCOUNTER — OFFICE VISIT (OUTPATIENT)
Dept: HEMATOLOGY/ONCOLOGY | Facility: CLINIC | Age: 84
End: 2024-12-26
Payer: MEDICARE

## 2024-12-26 VITALS
SYSTOLIC BLOOD PRESSURE: 127 MMHG | TEMPERATURE: 98 F | BODY MASS INDEX: 26.36 KG/M2 | OXYGEN SATURATION: 98 % | WEIGHT: 173.94 LBS | HEIGHT: 68 IN | RESPIRATION RATE: 17 BRPM | HEART RATE: 95 BPM | DIASTOLIC BLOOD PRESSURE: 68 MMHG

## 2024-12-26 DIAGNOSIS — Z79.899 IMMUNODEFICIENCY DUE TO CHEMOTHERAPY: ICD-10-CM

## 2024-12-26 DIAGNOSIS — T45.1X5A IMMUNODEFICIENCY DUE TO CHEMOTHERAPY: ICD-10-CM

## 2024-12-26 DIAGNOSIS — C78.01 MALIGNANT NEOPLASM METASTATIC TO BOTH LUNGS: ICD-10-CM

## 2024-12-26 DIAGNOSIS — D84.821 IMMUNODEFICIENCY DUE TO CHEMOTHERAPY: ICD-10-CM

## 2024-12-26 DIAGNOSIS — C79.9 ADENOCARCINOMA, METASTATIC: Primary | ICD-10-CM

## 2024-12-26 DIAGNOSIS — C79.9 ADENOCARCINOMA, METASTATIC: ICD-10-CM

## 2024-12-26 DIAGNOSIS — C78.02 MALIGNANT NEOPLASM METASTATIC TO BOTH LUNGS: ICD-10-CM

## 2024-12-26 DIAGNOSIS — C25.1 MALIGNANT NEOPLASM OF BODY OF PANCREAS: Primary | ICD-10-CM

## 2024-12-26 PROBLEM — Z79.69 IMMUNODEFICIENCY DUE TO CHEMOTHERAPY: Status: ACTIVE | Noted: 2024-12-26

## 2024-12-26 PROCEDURE — G2211 COMPLEX E/M VISIT ADD ON: HCPCS | Mod: S$PBB,,, | Performed by: INTERNAL MEDICINE

## 2024-12-26 PROCEDURE — 99215 OFFICE O/P EST HI 40 MIN: CPT | Mod: S$PBB,,, | Performed by: INTERNAL MEDICINE

## 2024-12-26 PROCEDURE — 99215 OFFICE O/P EST HI 40 MIN: CPT | Mod: PBBFAC,PN | Performed by: INTERNAL MEDICINE

## 2024-12-26 PROCEDURE — 99999 PR PBB SHADOW E&M-EST. PATIENT-LVL V: CPT | Mod: PBBFAC,,, | Performed by: INTERNAL MEDICINE

## 2024-12-26 RX ORDER — PROCHLORPERAZINE MALEATE 5 MG
5 TABLET ORAL EVERY 6 HOURS PRN
Qty: 60 TABLET | Refills: 3 | Status: SHIPPED | OUTPATIENT
Start: 2024-12-26 | End: 2025-12-26

## 2024-12-26 RX ORDER — SODIUM CHLORIDE 9 MG/ML
INJECTION, SOLUTION INTRAVENOUS CONTINUOUS
OUTPATIENT
Start: 2024-12-26

## 2024-12-26 RX ORDER — CEFAZOLIN SODIUM 2 G/50ML
2 SOLUTION INTRAVENOUS
OUTPATIENT
Start: 2024-12-26

## 2024-12-26 NOTE — PROGRESS NOTES
Subjective     Patient ID: Alfie Olson is a 84 y.o. male.    Chief Complaint: Hematology and Oncology (Pancreatic mets to lungs/ EUS )  Mirna Olson underwent a CT of the abdomen 11/22/2024 for ulcerative colitis follow-up, which incidentally revealed new findings in the lower portion of the lungs. 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 CT of the chest from 2019 shows pleural-based nodules with calcifications consistent with asbestos-related pleural plaques.     Mr. Olson has ulcerative colitis managed with Entyvio infusions at the clinic. He also reports prior diagnoses of COPD and asthma, controlled with an inhaler. His cardiac history includes one stent placed 2-3 years ago, for which he takes Plavix. [facility]    CT chest on 11/27/2024: Interval development of numerous spiculated pulmonary nodules and masses scattered throughout the bilateral lungs.  Findings are highly concerning for malignancy/metastatic disease, however differential also includes septic emboli.  Recommend correlation with tissue sampling.  2. Redemonstration of scattered bilateral calcified and noncalcified pleural plaques.  3. Mild bilateral peripheral predominant interstitial reticulation and ground-glass density suggestive for interstitial lung disease.  4. Trace left pleural fluid.  5. Several hypodense lesions in the pancreatic body/tail which could represent cysts or cystic neoplasm.  Recommend further evaluation with contrast enhanced MRI MRCP pancreatic mass protocol.    PET scan on 11/27/2024: . Innumerable pleural and parenchymal bilateral lung nodules with FDG uptake suggestive of metastatic disease or mesothelioma.  2. Nodule in the left axilla subcutaneous tissues with low level uptake is indeterminate.  Direct visualization warranted.  3. Partially calcified mildly enlarged  mediastinal lymph nodes with low level uptake are favored to be inflammatory.    MRI/MRCP on 12/11/2024: Probable pancreatic pseudocyst or side duct ectasia in the pancreatic tail.  No suspicious pancreatic lesions.     He underwent biopsy on 12/12/2024. Pathology reveals LINGULA, BIOPSY:--POSITIVE FOR ADENOCARCINOMA.      2. LUNG, RIGHT MIDDLE LOBE NODULE, BIOPSY:--POSITIVE FOR ADENOCARCINOMA.       Comment: The morphologic and immunophenotypic features are not entirely specific    but are most suggestive of pancreaticobiliary primary. Immunohistochemistry was performed with appropriate controls on block 1A based on the histopathologic findings and the results are summarized as  follows:     CK19: Positive. : Positive           Review of Systems   Constitutional:  Negative for appetite change and unexpected weight change.   HENT:  Negative for mouth sores.    Eyes:  Negative for visual disturbance.   Respiratory:  Positive for cough and shortness of breath.    Cardiovascular:  Negative for chest pain.   Gastrointestinal:  Positive for diarrhea. Negative for abdominal pain.   Genitourinary:  Negative for frequency.   Musculoskeletal:  Negative for back pain.   Integumentary:  Positive for rash.   Neurological:  Negative for headaches.   Hematological:  Negative for adenopathy.   Psychiatric/Behavioral:  The patient is nervous/anxious.      Allergies:  Review of patient's allergies indicates:  No Known Allergies    Medications:  Current Outpatient Medications   Medication Sig Dispense Refill    acetaminophen (TYLENOL) 500 MG tablet Take 1 tablet by mouth 4 (four) times daily as needed.      albuterol (PROVENTIL) 5 mg/mL nebulizer solution Take 2.5 mg by nebulization every 6 (six) hours as needed for Wheezing.      cholecalciferol, vitamin D3, (VITAMIN D3) 25 mcg (1,000 unit) capsule Take 1,000 Units by mouth.      cholestyramine, with sugar, 4 gram Powd Take 4 g by mouth 2 (two) times a day.      clopidogreL  (PLAVIX) 75 mg tablet 75 mg.      CYANOCOBALAMIN, VITAMIN B-12, (VITAMIN B-12 ORAL) Take by mouth.      famotidine (PEPCID) 40 MG tablet Take 40 mg by mouth nightly as needed.      fluorouraciL (EFUDEX) 5 % cream Apply topically.      fluticasone-salmeterol diskus inhaler 250-50 mcg Inhale 1 puff into the lungs Daily.      folic acid (FOLVITE) 1 MG tablet Take 1 mg by mouth once daily.      intrathecal pain pump compound 1 mL by Intrathecal route once. Medtronic pump  400mcg/ml   100mcg/ml  194.51mcg dilaudid/day  48.63mcg baclofen/day  Refill q71 days      ketoconazole (NIZORAL) 2 % cream       latanoprost 0.005 % ophthalmic solution Place 1 drop into both eyes every evening.      lipase-protease-amylase (CREON) 36,000-114,000- 180,000 unit CpDR Take 1 capsule by mouth 3 (three) times daily with meals. 90 capsule 2    multivitamin (THERAGRAN) per tablet Take 1 tablet by mouth once daily.      rosuvastatin (CRESTOR) 5 MG tablet Take 5 mg by mouth once daily.      sodium chloride 5% (MARTHA 128) 5 % ophthalmic solution Apply to eye.      tamsulosin (FLOMAX) 0.4 mg Cap Take 0.4 mg by mouth once daily.      budesonide-formoterol 160-4.5 mcg (SYMBICORT) 160-4.5 mcg/actuation HFAA Inhale 2 puffs into the lungs every 12 (twelve) hours.      prochlorperazine (COMPAZINE) 5 MG tablet Take 1 tablet (5 mg total) by mouth every 6 (six) hours as needed. 60 tablet 3     No current facility-administered medications for this visit.       PMH:  Past Medical History:   Diagnosis Date    Anticoagulant long-term use     Arthritis     Asthma     B12 deficiency     Cholelithiasis     Chronic back pain     Chronic diarrhea     Colon polyp     COPD (chronic obstructive pulmonary disease)     Diverticulosis     Full dentures     GERD (gastroesophageal reflux disease)     Glaucoma     left eye    History of fall     Hyperlipemia     JUAN DIEGO (obstructive sleep apnea)     denies CPAP use    Skin cancer     Basal cell    Ulcerative colitis 1961     Vitamin D deficiency     Wrist fracture 11/2024       PSH:  Past Surgical History:   Procedure Laterality Date    BACK SURGERY      CATARACT EXTRACTION W/ INTRAOCULAR LENS IMPLANT Right     COLONOSCOPY  2014    COLONOSCOPY  03/25/2015    diverticulosis, otherwise normal findings, biopsies taken from 4 quadrants- see media section for biopsy report, repeat in 1 year    COLONOSCOPY N/A 06/01/2016    Procedure: COLONOSCOPY;  Surgeon: Ravindra Ervin MD;  Location: [facility] END[facility];  Service: Endoscopy;  Laterality: N/A;    COLONOSCOPY N/A 09/01/2022    Procedure: COLONOSCOPY;  Surgeon: Lencho Ivory MD;  Location: [facility] END[facility];  Service: Endoscopy;  Laterality: N/A;    COLONOSCOPY N/A 08/18/2023    Procedure: COLONOSCOPY;  Surgeon: Lencho Ivory MD;  Location: [facility];  Service: Endoscopy;  Laterality: N/A;    COLONOSCOPY N/A 07/25/2024    Procedure: COLONOSCOPY;  Surgeon: Ravindra Ervin MD;  Location: [facility] ENDO;  Service: Gastroenterology;  Laterality: N/A;    CORONARY STENT PLACEMENT  2016    HERNIA REPAIR      rt inguinal    LUMBAR FUSION      L3,4,5    MULTIPLE TOOTH EXTRACTIONS      pain pump placement  04/08/2010    dilaudid/baclofen    ROBOTIC BRONCHOSCOPY Bilateral 12/12/2024    Procedure: ROBOTIC BRONCHOSCOPY;  Surgeon: Gregg White MD;  Location: [facility] OR;  Service: Pulmonary;  Laterality: Bilateral;    SKIN BIOPSY      SKIN CANCER EXCISION      UPPER GASTROINTESTINAL ENDOSCOPY  10/26/2009    hiatal hernia       FamHx:  Family History   Problem Relation Name Age of Onset    Cancer Sister  83        Liver cancer    Cancer Brother  54        Pancreastic cancer    Ulcerative colitis Son      Colon cancer Neg Hx      Colon polyps Neg Hx         SocHx:  Social History     Socioeconomic History    Marital status:    Tobacco Use    Smoking status: Never    Smokeless tobacco: Never   Substance and Sexual Activity    Alcohol use: No    Drug use: No    Sexual activity: Not Currently     Social  Drivers of Health     Financial Resource Strain: Low Risk  (12/25/2024)    Overall Financial Resource Strain (CARDIA)     Difficulty of Paying Living Expenses: Not hard at all   Food Insecurity: No Food Insecurity (12/25/2024)    Hunger Vital Sign     Worried About Running Out of Food in the Last Year: Never true     Ran Out of Food in the Last Year: Never true   Physical Activity: Insufficiently Active (12/25/2024)    Exercise Vital Sign     Days of Exercise per Week: 3 days     Minutes of Exercise per Session: 20 min   Stress: Stress Concern Present (12/25/2024)    [employer] - Occupational Stress Questionnaire     Feeling of Stress : To some extent   Housing Stability: Unknown (12/25/2024)    Housing Stability Vital Sign     Unable to Pay for Housing in the Last Year: No          Objective     Physical Exam  Vitals reviewed.   Constitutional:       Appearance: He is well-developed.   HENT:      Mouth/Throat:      Pharynx: No oropharyngeal exudate.   Cardiovascular:      Rate and Rhythm: Normal rate.      Heart sounds: Normal heart sounds.   Pulmonary:      Effort: Pulmonary effort is normal.      Breath sounds: Normal breath sounds. No wheezing.   Abdominal:      General: Bowel sounds are normal.      Palpations: Abdomen is soft.      Tenderness: There is no abdominal tenderness.   Musculoskeletal:         General: No tenderness.   Lymphadenopathy:      Cervical: No cervical adenopathy.   Skin:     General: Skin is warm and dry.      Findings: No rash.   Neurological:      Mental Status: He is alert and oriented to person, place, and time.      Coordination: Coordination normal.   Psychiatric:         Thought Content: Thought content normal.         Judgment: Judgment normal.              LABS:  WBC   Date Value Ref Range Status   12/11/2024 12.06 3.90 - 12.70 K/uL Final     Hemoglobin   Date Value Ref Range Status   12/11/2024 11.2 (L) 14.0 - 18.0 g/dL Final     Hematocrit   Date  Value Ref Range Status   12/11/2024 34.8 (L) 40.0 - 54.0 % Final     Platelets   Date Value Ref Range Status   12/11/2024 361 150 - 450 K/uL Final     Gran # (ANC)   Date Value Ref Range Status   07/02/2024 6.1 1.8 - 7.7 K/uL Final     Gran %   Date Value Ref Range Status   07/02/2024 63.7 38.0 - 73.0 % Final       Chemistry        Component Value Date/Time     (L) 12/11/2024 1102    K 4.3 12/11/2024 1102    CL 98 12/11/2024 1102    CO2 30 (H) 12/11/2024 1102    BUN 11 12/11/2024 1102    CREATININE 0.97 12/11/2024 1102    GLU 86 12/11/2024 1102        Component Value Date/Time    CALCIUM 9.3 12/11/2024 1102    ALKPHOS 58 10/23/2024 1255    AST 21 10/23/2024 1255    ALT 14 10/23/2024 1255    BILITOT 0.8 10/23/2024 1255          Assessment and Plan     1. Malignant neoplasm of body of pancreas  -     Ambulatory referral/consult to Genetics; Future; Expected date: 01/02/2025    2. Adenocarcinoma, metastatic  -     Ambulatory referral/consult to Hematology / Oncology  -     prochlorperazine (COMPAZINE) 5 MG tablet; Take 1 tablet (5 mg total) by mouth every 6 (six) hours as needed.  Dispense: 60 tablet; Refill: 3  -     Ambulatory referral/consult to Chemo School; Future; Expected date: 01/02/2025    3. Malignant neoplasm metastatic to both lungs    4. Immunodeficiency due to chemotherapy        Route Chart for Scheduling    Med Onc Chart Routing      Follow up with physician . Get auth for Gemzar and Abraxane. Schedule chemomschool ASAP. Needs to see Genetics too. Once chemo approved schedule CBc,CMP and see me to start Gemzar and ABraxane. labs and me day prior to treatment   Follow up with ROMANA    Infusion scheduling note    Injection scheduling note    Labs    Imaging    Pharmacy appointment    Other referrals                  Treatment Plan Information   OP GI nab-PACLitaxel + gemcitabine Q4W Lacy Dunne MD   Associated diagnosis: Adenocarcinoma, metastatic   noted on 12/26/2024   Line of treatment: First  Line  Treatment Goal: Palliative     Upcoming Treatment Dates - OP GI nab-PACLitaxel + gemcitabine Q4W    1/13/2025       Chemotherapy       PACLitaxeL protein-bound (ABRAXANE) 125 mg/m2 = 240 mg in 48 mL infusion       gemcitabine (GEMZAR) 1,950 mg in 0.9% NaCl 269.5 mL chemo infusion       Antiemetics       ondansetron injection 8 mg  1/27/2025       Chemotherapy       PACLitaxeL protein-bound (ABRAXANE) 125 mg/m2 = 240 mg in 48 mL infusion       gemcitabine (GEMZAR) 1,950 mg in 0.9% NaCl 269.5 mL chemo infusion       Antiemetics       ondansetron injection 8 mg  2/10/2025       Chemotherapy       PACLitaxeL protein-bound (ABRAXANE) 125 mg/m2 = 240 mg in 48 mL infusion       gemcitabine (GEMZAR) 1,950 mg in 0.9% NaCl 269.5 mL chemo infusion       Antiemetics       ondansetron injection 8 mg  2/24/2025       Chemotherapy       PACLitaxeL protein-bound (ABRAXANE) 125 mg/m2 = 240 mg in 48 mL infusion       gemcitabine (GEMZAR) 1,950 mg in 0.9% NaCl 269.5 mL chemo infusion       Antiemetics       ondansetron injection 8 mg    Therapy Plan Information  ENTYVIO GI for Ulcerative colitis, noted on 10/20/2022  Medications  vedolizumab (ENTYVIO) 300 mg/250 mL NS IVPB  300 mg, Intravenous, Every 4 weeks  PRN Medications  diphenhydrAMINE injection 25 mg  25 mg, Intravenous, PRN  acetaminophen tablet 650 mg  650 mg, Oral, PRN  albuterol-ipratropium 2.5 mg-0.5 mg/3 mL nebulizer solution 3 mL  3 mL, Nebulization, PRN  methylPREDNISolone sodium succinate injection 40 mg  40 mg, Intravenous, PRN  EPINEPHrine (PF) injection 0.3 mg  0.3 mg, Subcutaneous, PRN  Flushes  0.9% NaCl 250 mL flush bag  Intravenous, Every visit  sodium chloride 0.9% flush 10 mL  10 mL, Intravenous, Every visit  heparin, porcine (PF) 100 unit/mL injection flush 500 Units  500 Units, Intravenous, Every visit  alteplase injection 2 mg  2 mg, Intra-Catheter, Every visit      No therapy plan of the specified type found.    No therapy plan of the specified type  found.         Mr. Olson is a 84-year-old male with metastatic pancreatic cancer to lungs he comes in to discuss his management.     Extensively reviewed his clinical picture so far and that this is stage IV malignancy which can be treated but can not be cured.  We discussed palliative chemotherapy with Gemzar and Abraxane to be administered every 2 weeks with growth factor support  We will also schedule him for chemo school.     When he comes in next time we will discuss chemo care companion as well    E scribed Compazine in anticipation of starting chemotherapy    Visit today included increased complexity associated with the care of the episodic problem chemotherapy addressed and managing the longitudinal care of the patient due to the serious and/or complex managed problem(s) pancreatic cancer    Above plan reviewed with the patient and his accompanying son and all questions were answered to their satisfaction

## 2024-12-27 ENCOUNTER — TELEPHONE (OUTPATIENT)
Dept: HEMATOLOGY/ONCOLOGY | Facility: CLINIC | Age: 84
End: 2024-12-27
Payer: OTHER GOVERNMENT

## 2024-12-27 NOTE — NURSING
Spoke with patient, scheduled education, patient verbalized agreement to time/date/location. Scheduled appt with genetics. Pt verbalized agreement to time/date/location of [facility]. [facility], labs and fu. All questions and concerns addressed at this time. Education folder created and placed on Vickie's desk, Will continue to follow.

## 2024-12-29 ENCOUNTER — NURSE TRIAGE (OUTPATIENT)
Dept: ADMINISTRATIVE | Facility: CLINIC | Age: 84
End: 2024-12-29
Payer: MEDICARE

## 2024-12-29 NOTE — TELEPHONE ENCOUNTER
Patient currently has lung cancer    Hemoptysis x 20-30 minutes  Clot size of pencil eraser    Chest pain and difficulty breathing  AAO    Advised pt per protocol to call 911 now. Pt reports he is able to call 911; VU.       Reason for Disposition   [1] Chest pain AND [2] difficulty breathing    Additional Information   Negative: SEVERE difficulty breathing (e.g., struggling for each breath, speaks in single words)    Protocols used: Coughing Up Blood-A-AH

## 2024-12-30 ENCOUNTER — TELEPHONE (OUTPATIENT)
Dept: HEMATOLOGY/ONCOLOGY | Facility: CLINIC | Age: 84
End: 2024-12-30
Payer: MEDICARE

## 2024-12-30 DIAGNOSIS — C78.01 MALIGNANT NEOPLASM METASTATIC TO BOTH LUNGS: Primary | ICD-10-CM

## 2024-12-30 DIAGNOSIS — C25.1 MALIGNANT NEOPLASM OF BODY OF PANCREAS: ICD-10-CM

## 2024-12-30 DIAGNOSIS — C78.02 MALIGNANT NEOPLASM METASTATIC TO BOTH LUNGS: Primary | ICD-10-CM

## 2024-12-30 NOTE — TELEPHONE ENCOUNTER
----- Message from Alberta sent at 12/30/2024  4:04 PM CST -----  Type: Needs Medical Advice  Who Called:  Patient   Symptoms (please be specific):    How long has patient had these symptoms:    Pharmacy name and phone #:    Best Call Back Number: [phone]  Additional Information: Patient is requesting a call back from Destiney.

## 2024-12-30 NOTE — TELEPHONE ENCOUNTER
Patient called to ask if chemo was authorized yet. Advised it was in process with insurance still.

## 2024-12-31 ENCOUNTER — HOSPITAL ENCOUNTER (OUTPATIENT)
Dept: RADIOLOGY | Facility: HOSPITAL | Age: 84
Discharge: HOME | End: 2024-12-31
Attending: ORTHOPAEDIC SURGERY
Payer: MEDICARE

## 2024-12-31 ENCOUNTER — OFFICE VISIT (OUTPATIENT)
Dept: ORTHOPEDICS | Facility: CLINIC | Age: 84
End: 2024-12-31
Payer: MEDICARE

## 2024-12-31 ENCOUNTER — PATIENT MESSAGE (OUTPATIENT)
Dept: HEMATOLOGY/ONCOLOGY | Facility: CLINIC | Age: 84
End: 2024-12-31
Payer: OTHER GOVERNMENT

## 2024-12-31 DIAGNOSIS — S52.571A OTHER CLOSED INTRA-ARTICULAR FRACTURE OF DISTAL END OF RIGHT RADIUS, INITIAL ENCOUNTER: ICD-10-CM

## 2024-12-31 DIAGNOSIS — S52.551P OTHER CLOSED EXTRA-ARTICULAR FRACTURE OF DISTAL END OF RIGHT RADIUS WITH MALUNION, SUBSEQUENT ENCOUNTER: Primary | ICD-10-CM

## 2024-12-31 PROCEDURE — 99213 OFFICE O/P EST LOW 20 MIN: CPT | Mod: PBBFAC,25 | Performed by: ORTHOPAEDIC SURGERY

## 2024-12-31 PROCEDURE — 99214 OFFICE O/P EST MOD 30 MIN: CPT | Mod: S$PBB,,, | Performed by: ORTHOPAEDIC SURGERY

## 2024-12-31 PROCEDURE — 73110 X-RAY EXAM OF WRIST: CPT | Mod: 26,RT,, | Performed by: RADIOLOGY

## 2024-12-31 PROCEDURE — 73110 X-RAY EXAM OF WRIST: CPT | Mod: TC,RT

## 2024-12-31 PROCEDURE — 99999 PR PBB SHADOW E&M-EST. PATIENT-LVL III: CPT | Mod: PBBFAC,,, | Performed by: ORTHOPAEDIC SURGERY

## 2024-12-31 NOTE — ASSESSMENT & PLAN NOTE
I had a long discussion with the patient as well as his son today it appears as though he has gone on to malunion his fracture is healed but he has significant of dorsal angulation.  They understand this may limit his function he was just diagnosed with lung cancer and at this point he is not interested the performing or seeking further treatment regarding his right wrist.  He will wean himself out of the splint.  He may gradually return back to normal activities using pain in his his guide I have offered him follow up in 6 weeks and he would like to just call if he needs us.  I think this is reasonable given his situation.

## 2024-12-31 NOTE — PROGRESS NOTES
ALDEN Shea M.D.  Orthopaedic Hand and Wrist Surgery  [address]    Patient ID: Alfie Olson  YOB: 1940  MRN: [id number]    Date of Injury:  2024     Diagnosis:   Displaced right distal radius fracture    History of Present Illness: Alfie Olson is a 84 y.o. male here for follow up of his displaced right distal radius fracture.  He was recently diagnosed with lung cancer.  He is here today with the son.  He reports his wrist is feeling much better    Patient was queried and this is the extent of the patients current complaints today.    Physical Exam:   Skin:  No wounds  Sensation:  Intact in the fingertips  Motor:  Intact EPL FPL and FDP to all fingers  Swelling:  Minimal hand swelling  No extensor lag  Wrist extension 30°  Wrist flexion 10°  Supination 30 degree  Pronation 70°  No DRUJ instability    Imaging:  Interval callus formation patient has gone on to malunion    Provider Note/Medical Decision Makin. Other closed extra-articular fracture of distal end of right radius with malunion, subsequent encounter  Assessment & Plan:  I had a long discussion with the patient as well as his son today it appears as though he has gone on to malunion his fracture is healed but he has significant of dorsal angulation.  They understand this may limit his function he was just diagnosed with lung cancer and at this point he is not interested the performing or seeking further treatment regarding his right wrist.  He will wean himself out of the splint.  He may gradually return back to normal activities using pain in his his guide I have offered him follow up in 6 weeks and he would like to just call if he needs us.  I think this is reasonable given his situation.           I discussed worrisome and red flag signs and symptoms with the patient. The patient expressed understanding and agreed to alert me immediately or to go to the emergency room if they  experience any of these.   Treatment plan was developed with input from the patient/family, and they expressed understanding and agreement with the plan. All questions were answered today.    There are no Patient Instructions on file for this visit.    ALDEN Shea M.D.  [facility]  Orthopedic Hand and Wrist Surgeon    [facility]  Dr. Yon Shea   [place]   Acquaintable     Disclaimer: This note was prepared using a voice recognition system and is likely to have sound alike errors within the text.

## 2025-01-02 ENCOUNTER — CLINICAL SUPPORT (OUTPATIENT)
Dept: HEMATOLOGY/ONCOLOGY | Facility: CLINIC | Age: 85
End: 2025-01-02
Payer: MEDICARE

## 2025-01-02 ENCOUNTER — TELEPHONE (OUTPATIENT)
Dept: HEMATOLOGY/ONCOLOGY | Facility: CLINIC | Age: 85
End: 2025-01-02
Payer: MEDICARE

## 2025-01-02 ENCOUNTER — TELEPHONE (OUTPATIENT)
Dept: HEMATOLOGY/ONCOLOGY | Facility: CLINIC | Age: 85
End: 2025-01-02

## 2025-01-02 ENCOUNTER — DOCUMENTATION ONLY (OUTPATIENT)
Dept: INFUSION THERAPY | Facility: HOSPITAL | Age: 85
End: 2025-01-02
Payer: MEDICARE

## 2025-01-02 VITALS
HEART RATE: 100 BPM | TEMPERATURE: 98 F | BODY MASS INDEX: 26.47 KG/M2 | SYSTOLIC BLOOD PRESSURE: 120 MMHG | OXYGEN SATURATION: 98 % | DIASTOLIC BLOOD PRESSURE: 72 MMHG | RESPIRATION RATE: 16 BRPM | WEIGHT: 174.63 LBS | HEIGHT: 68 IN

## 2025-01-02 DIAGNOSIS — C79.9 ADENOCARCINOMA, METASTATIC: Primary | ICD-10-CM

## 2025-01-02 PROCEDURE — 99215 OFFICE O/P EST HI 40 MIN: CPT | Mod: S$PBB,,,

## 2025-01-02 PROCEDURE — 99215 OFFICE O/P EST HI 40 MIN: CPT | Mod: PBBFAC,PN

## 2025-01-02 PROCEDURE — 99999 PR PBB SHADOW E&M-EST. PATIENT-LVL V: CPT | Mod: PBBFAC,,,

## 2025-01-02 RX ORDER — AMMONIUM LACTATE 12 G/100G
LOTION TOPICAL
COMMUNITY
Start: 2024-12-04

## 2025-01-02 RX ORDER — MONTELUKAST SODIUM 10 MG/1
10 TABLET ORAL
COMMUNITY
Start: 2022-12-06

## 2025-01-02 RX ORDER — LIDOCAINE AND PRILOCAINE 25; 25 MG/G; MG/G
CREAM TOPICAL
Qty: 30 G | Refills: 0 | Status: SHIPPED | OUTPATIENT
Start: 2025-01-02

## 2025-01-02 NOTE — TELEPHONE ENCOUNTER
Spoke with patient.  He is calling to state he feels the [facility] is [facility] to getting treatment. He said he couldn't even  his compazine yet---as he needed to see his PCP for approval for them to fill. Nurse will try to contact the [facility] to see if they need anything from nurse to expedite and smooth the process.

## 2025-01-02 NOTE — TELEPHONE ENCOUNTER
----- Message from Jes sent at 1/2/2025 11:41 AM CST -----  Type: Needs Medical Advice  Who Called:  Alfie Mcdowell Call Back Number: [phone]   Additional Information: requesting a call back in regards to his meds that wasnt approve. Hoping to get communication going between  and [facility]

## 2025-01-02 NOTE — PROGRESS NOTES
JACKI and Abbie Herring RD met with Mr. Olson for chemo school. He is a pleasant, soft-spoken gentleman. Mr. Olson had his oldest son, Jan, with him for support. He denies any concerns regarding transportation to and from his treatments. SW explained our role as an extra layer of support if any needs arise either emotional or practical. The patient shared that he has lived with ulcerative colitis for years and discussed nutrition with WANG. He is retired ,  and has three sons. Mr. Olson declined a tour of the infusion center; he is familiar with the facility as he receives treatment once a month for the colitis.

## 2025-01-02 NOTE — PROGRESS NOTES
Subjective:       Name: Alfie Olson  : 1940  MRN: [id number]    Chief Complaint   Patient presents with    Malignant neoplasm of body of pancreas          HPI: Alfie Olson is a 84 y.o. male presents for evaluation of Malignant neoplasm of body of pancreas        Oncology History   Malignant neoplasm of body of pancreas   2024 Initial Diagnosis    Adenocarcinoma, metastatic     2024 Cancer Staged    Staging form: Exocrine Pancreas, AJCC 8th Edition  - Clinical: Stage IV (cT1c, cN0, cM1)     2025 -  Chemotherapy    Treatment Summary   Plan Name: OP GI modified nab-PACLitaxel + gemcitabine Q4W  Treatment Goal: Palliative  Status: Active  Start Date: 2025 (Planned)  End Date: 2025 (Planned)  Provider: Lacy Dunne MD  Chemotherapy: gemcitabine (GEMZAR) 1,950 mg in 0.9% NaCl 269.5 mL chemo infusion, 1,000 mg/m2 = 1,950 mg, Intravenous, Clinic/HOD 1 time, 0 of 6 cycles          Past Medical History:   Diagnosis Date    Anticoagulant long-term use     Arthritis     Asthma     B12 deficiency     Cholelithiasis     Chronic back pain     Chronic diarrhea     Colon polyp     COPD (chronic obstructive pulmonary disease)     Diverticulosis     Full dentures     GERD (gastroesophageal reflux disease)     Glaucoma     left eye    History of fall     Hyperlipemia     JUAN DIEGO (obstructive sleep apnea)     denies CPAP use    Skin cancer     Basal cell    Ulcerative colitis     Vitamin D deficiency     Wrist fracture 2024       Past Surgical History:   Procedure Laterality Date    BACK SURGERY      CATARACT EXTRACTION W/ INTRAOCULAR LENS IMPLANT Right     COLONOSCOPY      COLONOSCOPY  2015    diverticulosis, otherwise normal findings, biopsies taken from 4 quadrants- see media section for biopsy report, repeat in 1 year    COLONOSCOPY N/A 2016    Procedure: COLONOSCOPY;  Surgeon: Ravindra Ervin MD;  Location: [facility];  Service: Endoscopy;  Laterality: N/A;     COLONOSCOPY N/A 2022    Procedure: COLONOSCOPY;  Surgeon: Lencho Ivory MD;  Location: [facility] ENDO;  Service: Endoscopy;  Laterality: N/A;    COLONOSCOPY N/A 2023    Procedure: COLONOSCOPY;  Surgeon: Lencho Ivory MD;  Location: [facility] ENDO;  Service: Endoscopy;  Laterality: N/A;    COLONOSCOPY N/A 2024    Procedure: COLONOSCOPY;  Surgeon: Ravindra Ervin MD;  Location: [facility] ENDO;  Service: Gastroenterology;  Laterality: N/A;    CORONARY STENT PLACEMENT  2016    HERNIA REPAIR      rt inguinal    LUMBAR FUSION      L3,4,5    MULTIPLE TOOTH EXTRACTIONS      pain pump placement  2010    dilaudid/baclofen    ROBOTIC BRONCHOSCOPY Bilateral 2024    Procedure: ROBOTIC BRONCHOSCOPY;  Surgeon: Gregg White MD;  Location: [facility] OR;  Service: Pulmonary;  Laterality: Bilateral;    SKIN BIOPSY      SKIN CANCER EXCISION      UPPER GASTROINTESTINAL ENDOSCOPY  10/26/2009    hiatal hernia       Family History   Problem Relation Name Age of Onset    Liver cancer Sister  83    Pancreatic cancer Brother  54    Ulcerative colitis Son      Colon cancer Neg Hx      Colon polyps Neg Hx         Social History     Socioeconomic History    Marital status:    Tobacco Use    Smoking status: Former     Current packs/day: 0.00     Types: Cigarettes     Quit date:      Years since quittin.0    Smokeless tobacco: Never   Substance and Sexual Activity    Alcohol use: No    Drug use: No    Sexual activity: Not Currently     Social Drivers of Health     Financial Resource Strain: Low Risk  (2024)    Overall Financial Resource Strain (CARDIA)     Difficulty of Paying Living Expenses: Not hard at all   Food Insecurity: No Food Insecurity (2024)    Hunger Vital Sign     Worried About Running Out of Food in the Last Year: Never true     Ran Out of Food in the Last Year: Never true   Physical Activity: Insufficiently Active (2024)    Exercise Vital Sign     Days of Exercise per  "Week: 3 days     Minutes of Exercise per Session: 20 min   Stress: Stress Concern Present (12/25/2024)    [employer] [place] [employer] - Occupational Stress Questionnaire     Feeling of Stress : To some extent   Housing Stability: Unknown (12/25/2024)    Housing Stability Vital Sign     Unable to Pay for Housing in the Last Year: No       Review of patient's allergies indicates:  No Known Allergies    Review of Systems   Constitutional:  Negative for appetite change and unexpected weight change.   HENT:  Negative for mouth sores.    Eyes:  Negative for visual disturbance.   Respiratory:  Positive for shortness of breath. Negative for cough.    Cardiovascular:  Positive for chest pain.   Gastrointestinal:  Negative for abdominal pain and diarrhea.   Genitourinary:  Negative for frequency.   Musculoskeletal:  Negative for back pain.   Integumentary:  Positive for rash.   Neurological:  Negative for headaches.   Hematological:  Negative for adenopathy.   Psychiatric/Behavioral:  The patient is nervous/anxious.             Objective:     Vitals:    01/02/25 1425   BP: 120/72   BP Location: Right arm   Patient Position: Sitting   Pulse: 100   Resp: 16   Temp: 98.2 °F (36.8 °C)   TempSrc: Temporal   SpO2: 98%   Weight: 79.2 kg (174 lb 9.7 oz)   Height: 5' 8" (1.727 m)        Physical Exam  Vitals reviewed.   HENT:      Head: Normocephalic and atraumatic.   Pulmonary:      Effort: Pulmonary effort is normal.   Skin:     General: Skin is warm and dry.   Neurological:      Mental Status: He is alert and oriented to person, place, and time.   Psychiatric:         Mood and Affect: Mood normal.                Current Outpatient Medications on File Prior to Visit   Medication Sig    acetaminophen (TYLENOL) 500 MG tablet Take 1 tablet by mouth 4 (four) times daily as needed.    albuterol (PROVENTIL) 5 mg/mL nebulizer solution Take 2.5 mg by nebulization every 6 (six) hours as needed for Wheezing.    ammonium lactate " (LAC-HYDRIN) 12 % lotion Apply topically.    amoxicillin-clavulanate 875-125mg (AUGMENTIN) 875-125 mg per tablet Take 1 tablet by mouth 2 (two) times daily.    cholecalciferol, vitamin D3, (VITAMIN D3) 25 mcg (1,000 unit) capsule Take 1,000 Units by mouth.    cholestyramine, with sugar, 4 gram Powd Take 4 g by mouth 2 (two) times a day.    clopidogreL (PLAVIX) 75 mg tablet 75 mg.    CYANOCOBALAMIN, VITAMIN B-12, (VITAMIN B-12 ORAL) Take by mouth.    doxycycline (VIBRAMYCIN) 100 MG Cap Take 1 capsule (100 mg total) by mouth 2 (two) times daily. for 7 days    famotidine (PEPCID) 40 MG tablet Take 40 mg by mouth nightly as needed.    fluorouraciL (EFUDEX) 5 % cream Apply topically.    fluticasone-salmeterol diskus inhaler 250-50 mcg Inhale 1 puff into the lungs Daily.    folic acid (FOLVITE) 1 MG tablet Take 1 mg by mouth once daily.    intrathecal pain pump compound 1 mL by Intrathecal route once. Medtronic pump  400mcg/ml   100mcg/ml  194.51mcg dilaudid/day  48.63mcg baclofen/day  Refill q71 days    ketoconazole (NIZORAL) 2 % cream     latanoprost 0.005 % ophthalmic solution Place 1 drop into both eyes every evening.    lipase-protease-amylase (CREON) 36,000-114,000- 180,000 unit CpDR Take 1 capsule by mouth 3 (three) times daily with meals.    montelukast (SINGULAIR) 10 mg tablet Take 10 mg by mouth.    multivitamin (THERAGRAN) per tablet Take 1 tablet by mouth once daily.    prochlorperazine (COMPAZINE) 5 MG tablet Take 1 tablet (5 mg total) by mouth every 6 (six) hours as needed. (Patient taking differently: Take 5 mg by mouth every 6 (six) hours as needed. NEVER STARTED)    rosuvastatin (CRESTOR) 5 MG tablet Take 5 mg by mouth once daily.    sodium chloride 5% (MARTHA 128) 5 % ophthalmic solution Apply to eye.    tamsulosin (FLOMAX) 0.4 mg Cap Take 0.4 mg by mouth once daily.    budesonide-formoterol 160-4.5 mcg (SYMBICORT) 160-4.5 mcg/actuation HFAA Inhale 2 puffs into the lungs every 12 (twelve) hours.     No  current facility-administered medications on file prior to visit.       CBC:  Lab Results   Component Value Date    WBC 13.20 (H) 12/29/2024    HGB 11.6 (L) 12/29/2024    HCT 36.1 (L) 12/29/2024    MCV 89 12/29/2024     12/29/2024         CMP:  Sodium   Date Value Ref Range Status   12/29/2024 137 136 - 145 mmol/L Final     Potassium   Date Value Ref Range Status   12/29/2024 3.9 3.5 - 5.1 mmol/L Final     Comment:     Anion Gap reference range revised on 4/28/2023     Chloride   Date Value Ref Range Status   12/29/2024 104 95 - 110 mmol/L Final     CO2   Date Value Ref Range Status   12/29/2024 27 23 - 29 mmol/L Final     Glucose   Date Value Ref Range Status   12/29/2024 94 70 - 110 mg/dL Final     Comment:     The ADA recommends the following guidelines for fasting glucose:    Normal:       less than 100 mg/dL    Prediabetes:  100 mg/dL to 125 mg/dL    Diabetes:     126 mg/dL or higher       BUN   Date Value Ref Range Status   12/29/2024 13 8 - 23 mg/dL Final     Creatinine   Date Value Ref Range Status   12/29/2024 0.85 0.50 - 1.40 mg/dL Final     Calcium   Date Value Ref Range Status   12/29/2024 9.4 8.7 - 10.5 mg/dL Final     Total Protein   Date Value Ref Range Status   12/29/2024 7.5 6.0 - 8.4 g/dL Final     Albumin   Date Value Ref Range Status   12/29/2024 3.7 3.5 - 5.2 g/dL Final     Total Bilirubin   Date Value Ref Range Status   12/29/2024 0.8 0.2 - 1.0 mg/dL Final     Alkaline Phosphatase   Date Value Ref Range Status   12/29/2024 55 40 - 150 U/L Final     AST   Date Value Ref Range Status   12/29/2024 24 10 - 40 U/L Final     ALT   Date Value Ref Range Status   12/29/2024 10 10 - 44 U/L Final     Anion Gap   Date Value Ref Range Status   12/29/2024 6 (L) 8 - 16 mmol/L Final     Comment:     Anion Gap reference range revised on 4/28/2023       X-Ray Wrist Complete Right  Narrative: EXAM:XR WRIST COMPLETE 3 VIEWS RIGHT    CLINICAL HISTORY:  Distal right radius fracture.    COMPARISON:  11/27/2024.    Right wrist x-ray, 6 views    FINDINGS:    Intra-articular distal radius fracture with shortening and dorsal angulation.  Persistent lucency of the distal radius fracture which has slightly improved indicating interval healing since 11/27/2024.  Secondary ulnar positive variance may abut the lunate.  Remaining bones intact.  Joint spaces well-preserved.  Soft tissues are normal.  Impression: Intra-articular distal radius fracture with partial interval healing since 11/27/2024.    Finalized on: 12/31/2024 5:01 PM By:  John Monroy MD  [facility]# [id number]      2024-12-31 17:03:[id number]    [facility]       ECOG SCORE    0 - Fully active-able to carry on all pre-disease performance without restriction              Assessment:       1. Adenocarcinoma, metastatic         Plan:   1. Adenocarcinoma, metastatic  -     Ambulatory referral/consult to [facility]  -     LIDOcaine-prilocaine (EMLA) cream; Apply topically as needed (apply to port 30 min prior to chemo).  Dispense: 30 g; Refill: 0         - Ambulatory referral/consult to [facility]    1. Treatment plan of nab-Paclitaxel + gemcitabine every  6 cycles discussed with patient.  2. Handouts provided on chemotherapy agents. Premeds, supportive meds explained.  3. Discussed the mechanism of action, potential side effects of this treatment as well as ways to manage them at home.   4. Dietary modifications discussed. Detail instructions to be provided by dietician.   5. Chemotherapy portfolio supplied with contact information.   6. Discussed follow-up with the physician for toxicity monitoring throughout treatment.    7. Scripts were escribed prior and explained for home use.   8. Consented the patient to the treatment plan and the patient was educated on the planned duration of the treatment and schedule of the treatment administration.  9. Awaiting start date  patient has home prescriptions.(Comapzine, emla)  10. Encouraged to call office - phone number  provided - to assist with any concerns.    Plan was discussed with the patient at length, and he verbalized understanding. Alfie was given an opportunity to ask questions that were answered to his satisfaction, and he was advised to call in the interval if any problems or questions arise.    Signed:  Vickie Avina NP   Hematology and Oncology

## 2025-01-02 NOTE — TELEPHONE ENCOUNTER
----- Message from Med Assistant Grady sent at 1/2/2025  9:48 AM CST -----  Contact: pt [phone]  I do not know who I send this to answer this pt ? About his va insurance  ----- Message -----  From: Laura Bishop  Sent: 1/2/2025   9:32 AM CST  To: [facility]  [facility]    Type: Needs Medical Advice  Who Called:  Pt     Best Call Back Number: [phone]    Additional Information: Pt calling to ask if all his appts at [facility] are going to be billed to [employer] insurance. Pls call back and advise

## 2025-01-02 NOTE — PROGRESS NOTES
Oncology Nutrition   New Patient Education  Alfie Olson   1940    Nutrition Education   This is a 84 y.o.male with a medical diagnosis of pancreatic cancer metastatic to both lungs.     Met w/ pt to discuss current nutritional status and nutrition as it relates to cancer and cancer treatment. Pt currently moderate nutrition risk. Pt's son, Jan, present at the time of new pt education. Pt reports good appetite and recently he has had nausea for about an hour after he eats. Pt has a pmh of ulcerative colitis. Pt states has diarrhea daily. He has been put on Creon and the prescription is for 1 capsule 3x's daily with meals. Pt reports he was taking probiotics and it helped bulk his stool. RD encouraged pt to continue with probiotics. Pt unable to tolerate beans, dairy products, and cruciferous vegetables. He will occasionally have a bowl of Cheerios with milk.   RD discussed role in POC. Pt currently receives Entyvio infusions for his UC.     Wt Readings from Last 10 Encounters:   01/02/25 79.8 kg (176 lb)   01/02/25 79.2 kg (174 lb 9.7 oz)   12/29/24 79.8 kg (176 lb)   12/26/24 78.9 kg (173 lb 15.1 oz)   12/24/24 78.2 kg (172 lb 6.4 oz)   12/18/24 77.2 kg (170 lb 3.1 oz)   12/12/24 77.1 kg (170 lb)   11/26/24 77.4 kg (170 lb 10.2 oz)   11/20/24 78.7 kg (173 lb 8 oz)   10/23/24 78.8 kg (173 lb 11.6 oz)      [x] PMHx reviewed  [x] Labs reviewed    Educated on food safety and common nutrition impact symptoms associated with chemotherapy treatment. Reinforced the importance of good hydration. Handouts provided.    Answered all nutrition related questions.     Patient provided with dietitian contact number and advised to call with questions or make future appointment if further intervention is needed. RD to follow throughout tx prn.    Abbie Herring, ALEXUS, LDN  01/02/2025  4:23 PM

## 2025-01-02 NOTE — NURSING
Spoke with patient he has concerns about his VA insurance being primary, provided the phone number for financial services, he also had questions about his oral medications, he will speak with Dr. Dunne's nurse about those concerns. Answered all questions and concerns to the best of my ability and provided information so that he could obtain answers to questions r/t insurance. Pt will also reach out to his POC for VA to ensure that everything is being handled as he wishes. Will continue to follow.

## 2025-01-03 ENCOUNTER — ANESTHESIA EVENT (OUTPATIENT)
Dept: SURGERY | Facility: HOSPITAL | Age: 85
End: 2025-01-03
Payer: MEDICARE

## 2025-01-03 ENCOUNTER — TELEPHONE (OUTPATIENT)
Dept: HEMATOLOGY/ONCOLOGY | Facility: CLINIC | Age: 85
End: 2025-01-03
Payer: MEDICARE

## 2025-01-03 ENCOUNTER — ANESTHESIA (OUTPATIENT)
Dept: SURGERY | Facility: HOSPITAL | Age: 85
End: 2025-01-03
Payer: MEDICARE

## 2025-01-03 ENCOUNTER — HOSPITAL ENCOUNTER (OUTPATIENT)
Facility: HOSPITAL | Age: 85
Discharge: HOME | End: 2025-01-03
Attending: STUDENT IN AN ORGANIZED HEALTH CARE EDUCATION/TRAINING PROGRAM | Admitting: STUDENT IN AN ORGANIZED HEALTH CARE EDUCATION/TRAINING PROGRAM
Payer: MEDICARE

## 2025-01-03 VITALS
WEIGHT: 174 LBS | RESPIRATION RATE: 18 BRPM | HEART RATE: 87 BPM | TEMPERATURE: 98 F | DIASTOLIC BLOOD PRESSURE: 62 MMHG | OXYGEN SATURATION: 98 % | SYSTOLIC BLOOD PRESSURE: 103 MMHG | HEIGHT: 68 IN | BODY MASS INDEX: 26.37 KG/M2

## 2025-01-03 DIAGNOSIS — C78.02 MALIGNANT NEOPLASM METASTATIC TO BOTH LUNGS: Primary | ICD-10-CM

## 2025-01-03 DIAGNOSIS — C25.9 PANCREATIC CANCER: ICD-10-CM

## 2025-01-03 DIAGNOSIS — Z45.2 ENCOUNTER FOR INSERTION OF VENOUS ACCESS PORT: ICD-10-CM

## 2025-01-03 DIAGNOSIS — C78.01 MALIGNANT NEOPLASM METASTATIC TO BOTH LUNGS: Primary | ICD-10-CM

## 2025-01-03 PROCEDURE — C1788 PORT, INDWELLING, IMP: HCPCS | Performed by: STUDENT IN AN ORGANIZED HEALTH CARE EDUCATION/TRAINING PROGRAM

## 2025-01-03 PROCEDURE — 36000706: Performed by: STUDENT IN AN ORGANIZED HEALTH CARE EDUCATION/TRAINING PROGRAM

## 2025-01-03 PROCEDURE — 36000707: Performed by: STUDENT IN AN ORGANIZED HEALTH CARE EDUCATION/TRAINING PROGRAM

## 2025-01-03 PROCEDURE — 63600175 PHARM REV CODE 636 W HCPCS: Performed by: STUDENT IN AN ORGANIZED HEALTH CARE EDUCATION/TRAINING PROGRAM

## 2025-01-03 PROCEDURE — 63600175 PHARM REV CODE 636 W HCPCS

## 2025-01-03 PROCEDURE — 71000016 HC POSTOP RECOV ADDL HR: Performed by: STUDENT IN AN ORGANIZED HEALTH CARE EDUCATION/TRAINING PROGRAM

## 2025-01-03 PROCEDURE — 71000015 HC POSTOP RECOV 1ST HR: Performed by: STUDENT IN AN ORGANIZED HEALTH CARE EDUCATION/TRAINING PROGRAM

## 2025-01-03 PROCEDURE — 37000008 HC ANESTHESIA 1ST 15 MINUTES: Performed by: STUDENT IN AN ORGANIZED HEALTH CARE EDUCATION/TRAINING PROGRAM

## 2025-01-03 PROCEDURE — 25000003 PHARM REV CODE 250

## 2025-01-03 PROCEDURE — 25000003 PHARM REV CODE 250: Performed by: STUDENT IN AN ORGANIZED HEALTH CARE EDUCATION/TRAINING PROGRAM

## 2025-01-03 PROCEDURE — 77001 FLUOROGUIDE FOR VEIN DEVICE: CPT | Mod: 26,,, | Performed by: STUDENT IN AN ORGANIZED HEALTH CARE EDUCATION/TRAINING PROGRAM

## 2025-01-03 PROCEDURE — 37000009 HC ANESTHESIA EA ADD 15 MINS: Performed by: STUDENT IN AN ORGANIZED HEALTH CARE EDUCATION/TRAINING PROGRAM

## 2025-01-03 PROCEDURE — 36561 INSERT TUNNELED CV CATH: CPT | Mod: RT,,, | Performed by: STUDENT IN AN ORGANIZED HEALTH CARE EDUCATION/TRAINING PROGRAM

## 2025-01-03 DEVICE — KIT POWERPORT SINGLE 8FR: Type: IMPLANTABLE DEVICE | Site: CHEST | Status: FUNCTIONAL

## 2025-01-03 RX ORDER — BUPIVACAINE HYDROCHLORIDE AND EPINEPHRINE 2.5; 5 MG/ML; UG/ML
INJECTION, SOLUTION EPIDURAL; INFILTRATION; INTRACAUDAL; PERINEURAL
Status: DISCONTINUED | OUTPATIENT
Start: 2025-01-03 | End: 2025-01-03 | Stop reason: HOSPADM

## 2025-01-03 RX ORDER — HEPARIN SODIUM 1000 [USP'U]/ML
INJECTION, SOLUTION INTRAVENOUS; SUBCUTANEOUS
Status: DISCONTINUED | OUTPATIENT
Start: 2025-01-03 | End: 2025-01-03 | Stop reason: HOSPADM

## 2025-01-03 RX ORDER — CEFAZOLIN SODIUM 1 G/3ML
INJECTION, POWDER, FOR SOLUTION INTRAMUSCULAR; INTRAVENOUS
Status: DISCONTINUED | OUTPATIENT
Start: 2025-01-03 | End: 2025-01-03

## 2025-01-03 RX ORDER — PROPOFOL 10 MG/ML
VIAL (ML) INTRAVENOUS
Status: DISCONTINUED | OUTPATIENT
Start: 2025-01-03 | End: 2025-01-03

## 2025-01-03 RX ORDER — PROPOFOL 10 MG/ML
VIAL (ML) INTRAVENOUS CONTINUOUS PRN
Status: DISCONTINUED | OUTPATIENT
Start: 2025-01-03 | End: 2025-01-03

## 2025-01-03 RX ORDER — ONDANSETRON HYDROCHLORIDE 2 MG/ML
INJECTION, SOLUTION INTRAVENOUS
Status: DISCONTINUED | OUTPATIENT
Start: 2025-01-03 | End: 2025-01-03

## 2025-01-03 RX ORDER — FAMOTIDINE 10 MG/ML
INJECTION INTRAVENOUS
Status: DISCONTINUED | OUTPATIENT
Start: 2025-01-03 | End: 2025-01-03

## 2025-01-03 RX ADMIN — CEFAZOLIN 2 G: 330 INJECTION, POWDER, FOR SOLUTION INTRAMUSCULAR; INTRAVENOUS at 08:01

## 2025-01-03 RX ADMIN — PROPOFOL 30 MG: 10 INJECTION, EMULSION INTRAVENOUS at 08:01

## 2025-01-03 RX ADMIN — SODIUM CHLORIDE, SODIUM LACTATE, POTASSIUM CHLORIDE, AND CALCIUM CHLORIDE: .6; .31; .03; .02 INJECTION, SOLUTION INTRAVENOUS at 08:01

## 2025-01-03 RX ADMIN — ONDANSETRON 4 MG: 2 INJECTION INTRAMUSCULAR; INTRAVENOUS at 08:01

## 2025-01-03 RX ADMIN — PROPOFOL 50 MCG/KG/MIN: 10 INJECTION, EMULSION INTRAVENOUS at 08:01

## 2025-01-03 RX ADMIN — FAMOTIDINE 20 MG: 10 INJECTION, SOLUTION INTRAVENOUS at 08:01

## 2025-01-03 NOTE — PLAN OF CARE
0930- pt is alert and oriented breathing even unlabored with no complaints of pain. Surgical site is clean and dry with no redness or swelling noted. Pt sitting up drinking fluids. 1040- pt is alert and oriented breathing even unlabored with no complaints of pain. Surgical site is clean and dry with no redness or swelling noted.detailed discharge instructions given to the pt and his son.

## 2025-01-03 NOTE — ANESTHESIA POSTPROCEDURE EVALUATION
Anesthesia Post Evaluation    Patient: Alfie Olson    Procedure(s) Performed: Procedure(s) (LRB):  [id number]-CATH (Right)    Final Anesthesia Type: general      Patient location: ASU.  Patient participation: Yes- Able to Participate  Level of consciousness: awake and alert  Post-procedure vital signs: reviewed and stable  Pain management: adequate  Airway patency: patent    PONV status at discharge: No PONV  Anesthetic complications: no      Cardiovascular status: blood pressure returned to baseline and stable  Respiratory status: unassisted and room air  Hydration status: euvolemic  Follow-up not needed.  Comments: The chest x-ray was obtained after the surgery in the ICU. The port is well placed with its tip overlying the superior vena cava and no apparent pneumothorax.                  Vitals Value Taken Time   /58 01/03/25 0930   Temp 36.4 °C (97.6 °F) 01/03/25 0930   Pulse 100 01/03/25 0930   Resp 17 01/03/25 0930   SpO2 95 % 01/03/25 0930         No case tracking events are documented in the log.      Pain/Loly Score: Loly Score: 10 (1/3/2025  9:30 AM)

## 2025-01-03 NOTE — DISCHARGE SUMMARY
[facility]  Brief Operative Note    Surgery Date: 1/3/2025     Surgeons and Role:     * Adiel Alejo MD - Primary    Assisting Surgeon: None    Pre-op Diagnosis:  Pancreatic cancer [C25.9]  Encounter for insertion of venous access port [Z45.2]    Post-op Diagnosis:  Post-Op Diagnosis Codes:     * Pancreatic cancer [C25.9]     * Encounter for insertion of venous access port [Z45.2]    Procedure(s) (LRB):  [id number] (Right)    Anesthesia: General    Operative Findings:  Tunneled right IJ port    Estimated Blood Loss:  Minimal         Specimens:   Specimen (24h ago, onward)      None              Discharge Note    OUTCOME: Patient tolerated treatment/procedure well without complication and is now ready for discharge.    DISPOSITION: Home or Self Care    FINAL DIAGNOSIS:  Malignant neoplasm metastatic to both lungs    FOLLOWUP: In clinic    DISCHARGE INSTRUCTIONS:    Discharge Procedure Orders   Diet Adult Regular     Notify your health care provider if you experience any of the following:  redness, tenderness, or signs of infection (pain, swelling, redness, odor or green/yellow discharge around incision site)     Notify your health care provider if you experience any of the following:  severe uncontrolled pain     Notify your health care provider if you experience any of the following:  temperature >100.4     No dressing needed   Order Comments: Okay to shower.  No swimming or soaking for 2-3 weeks     Activity as tolerated

## 2025-01-03 NOTE — TRANSFER OF CARE
"Anesthesia Transfer of Care Note    Patient: Alfie Olson    Procedure(s) Performed: Procedure(s) (LRB):  [id number] (Right)    Patient location: Other: same day    Anesthesia Type: MAC    Transport from OR: Transported from OR on room air with adequate spontaneous ventilation    Post pain: adequate analgesia    Post assessment: no apparent anesthetic complications and tolerated procedure well    Post vital signs: stable    Level of consciousness: awake, alert and oriented    Nausea/Vomiting: no nausea/vomiting    Complications: none    Transfer of care protocol was followed      Last vitals: Visit Vitals  /64   Pulse 75   Temp 36.7 °C (98.1 °F) (Oral)   Resp 17   Ht 5' 8" (1.727 m)   Wt 78.9 kg (174 lb)   SpO2 97%   BMI 26.46 kg/m²     "

## 2025-01-03 NOTE — TELEPHONE ENCOUNTER
Appt changed for pt.   ----- Message from Nurse Alvarez sent at 1/3/2025  2:32 PM CST -----    ----- Message -----  From: Sierra Wood  Sent: 1/3/2025   2:31 PM CST  To: [facility]    Type: Needs Medical Advice  Who Called:  patient   Symptoms (please be specific):    How long has patient had these symptoms:    Pharmacy name and phone #:    Best Call Back Number: [phone]  Additional Information: patient is requesting a cll back to change his  NP appt on 1/6

## 2025-01-03 NOTE — OP NOTE
[facility]  Surgery Department  Operative Note    SUMMARY     Date of Procedure: 1/3/2025     Procedure: Procedure(s) (LRB):  [id number] (Right)     Surgeons and Role:     * Adiel Alejo MD - Primary    Assisting Surgeon: None    Pre-Operative Diagnosis:  Lung cancer  Encounter for insertion of venous access port [Z45.2]    Post-Operative Diagnosis: Post-Op Diagnosis Codes:   Lung cancer     * Encounter for insertion of venous access port [Z45.2]    Anesthesia: General    Operative Findings (including complications, if any):  This is an 84-year-old male with metastatic lung cancer to both lungs.  He did consent to port placement for chemotherapy.  He was taken to the OR, placed supine, sedated by Anesthesia, the neck and chest were prepped and draped in the usual fashion, and a time-out was completed.  Using ultrasound, the right internal jugular vein was cannulated with a hollow bore needle.  A wire was advanced and confirmed to be in appropriate location using ultrasound and fluoroscopy.  A location on the right chest wall was chosen for the port site.  A 3 cm incision was made sharply.  A port pocket was developed using electrocautery.  The port was secured within the pocket using Vicryl sutures.  The catheter was then tunneled from the chest wall to the wire exit site in the neck.  Using Seldinger technique and under fluoroscopic guidance, the wire was exchanged for a split sheath dilator without apparent complication.  The catheter was advanced down the sheath and the sheath was then removed.  Repeat fluoroscopic imaging confirmed appropriate location of the catheter without kinking.  The port was accessed, aspirated, and then flushed with heparinized saline with ease.  The port pocket was closed in layers with Vicryl and Monocryl.  The wire exit site in the neck was closed with a buried Vicryl suture.  Dermabond was applied as a dressing.  Patient tolerated the entire procedure  without apparent complication, was woken from anesthesia, brought to recovery in stable condition.  All counts were correct.    Description of Technical Procedures:  Tunneled right IJ port    Significant Surgical Tasks Conducted by the Assistant(s), if Applicable: n/a    Estimated Blood Loss (EBL): min           Implants:   Implant Name Type Inv. Item Serial No.  Lot No. LRB No. Used Action   KIT POWERPORT SINGLE 8FR - [id number]  KIT POWERPORT SINGLE 8FR  [employer] [id number] 1 Implanted       Specimens:   Specimen (24h ago, onward)      None                    Condition: Good    Disposition: PACU - hemodynamically stable.    Attestation: Op Note Attestation: I was physically present and scrubbed for the entire procedure.

## 2025-01-03 NOTE — ANESTHESIA PREPROCEDURE EVALUATION
01/03/2025  Alfie Olson is a 84 y.o., male.    Patient Active Problem List   Diagnosis    Hx of colonic polyps    Ulcerative colitis    Closed fracture of right distal radius    Malignant neoplasm of body of pancreas    Malignant neoplasm metastatic to both lungs    Immunodeficiency due to chemotherapy       Past Surgical History:   Procedure Laterality Date    BACK SURGERY      CATARACT EXTRACTION W/ INTRAOCULAR LENS IMPLANT Right     COLONOSCOPY  2014    COLONOSCOPY  03/25/2015    diverticulosis, otherwise normal findings, biopsies taken from 4 quadrants- see media section for biopsy report, repeat in 1 year    COLONOSCOPY N/A 06/01/2016    Procedure: COLONOSCOPY;  Surgeon: Ravindra Ervin MD;  Location: [facility] ENDO;  Service: Endoscopy;  Laterality: N/A;    COLONOSCOPY N/A 09/01/2022    Procedure: COLONOSCOPY;  Surgeon: Lencho Ivory MD;  Location: [facility];  Service: Endoscopy;  Laterality: N/A;    COLONOSCOPY N/A 08/18/2023    Procedure: COLONOSCOPY;  Surgeon: Lencho Ivory MD;  Location: [facility] ENDO;  Service: Endoscopy;  Laterality: N/A;    COLONOSCOPY N/A 07/25/2024    Procedure: COLONOSCOPY;  Surgeon: Ravindra Ervin MD;  Location: [facility];  Service: Gastroenterology;  Laterality: N/A;    CORONARY STENT PLACEMENT  2016    HERNIA REPAIR      rt inguinal    LUMBAR FUSION      L3,4,5    MULTIPLE TOOTH EXTRACTIONS      pain pump placement  04/08/2010    dilaudid/baclofen    ROBOTIC BRONCHOSCOPY Bilateral 12/12/2024    Procedure: ROBOTIC BRONCHOSCOPY;  Surgeon: Gregg White MD;  Location: [facility];  Service: Pulmonary;  Laterality: Bilateral;    SKIN BIOPSY      SKIN CANCER EXCISION      UPPER GASTROINTESTINAL ENDOSCOPY  10/26/2009    hiatal hernia        Tobacco Use:  The patient  reports that he quit smoking about 42 years ago. His smoking use included cigarettes. He has never  used smokeless tobacco.     Results for orders placed or performed during the hospital encounter of 12/29/24   EKG 12-lead    Collection Time: 12/29/24 11:13 AM   Result Value Ref Range    QRS Duration 88 ms    [facility] QTC Calculation 426 ms    Narrative    Test Reason : R04.2,    Vent. Rate :  78 BPM     Atrial Rate :  78 BPM     P-R Int : 162 ms          QRS Dur :  88 ms      QT Int : 374 ms       P-R-T Axes :  40  63  19 degrees    QTcB Int : 426 ms    Normal sinus rhythm  Anterior infarct (cited on or before 11-Dec-2024)  Abnormal ECG  When compared with ECG of 11-Dec-2024 11:12,  No significant change was found  Confirmed by Alberto Montoya ([id number]) on 12/30/2024 9:35:34 AM    Referred By:            Confirmed By: Alebrto Montoya             Lab Results   Component Value Date    WBC 13.20 (H) 12/29/2024    HGB 11.6 (L) 12/29/2024    HCT 36.1 (L) 12/29/2024    MCV 89 12/29/2024     12/29/2024     BMP  Lab Results   Component Value Date     12/29/2024    K 3.9 12/29/2024     12/29/2024    CO2 27 12/29/2024    BUN 13 12/29/2024    CREATININE 0.85 12/29/2024    CALCIUM 9.4 12/29/2024    ANIONGAP 6 (L) 12/29/2024    GLU 94 12/29/2024    GLU 86 12/11/2024     (H) 10/23/2024       No results found for this or any previous visit.            Pre-op Assessment    I have reviewed the Patient Summary Reports.     I have reviewed the Nursing Notes. I have reviewed the NPO Status.   I have reviewed the Medications.     Review of Systems  Anesthesia Hx:  No problems with previous Anesthesia             Denies Family Hx of Anesthesia complications.    Denies Personal Hx of Anesthesia complications.                    Social:  Non-Smoker       Hematology/Oncology:       -- Anemia:               Hematology Comments: Leukocytosis    Current/Recent Cancer. (Lung and Pancreatic cancer)                EENT/Dental:  EENT/Dental Normal           Cardiovascular:  Cardiovascular Normal                                               Pulmonary:   COPD Asthma    Sleep Apnea Does not use CPAP          Education provided regarding risk of obstructive sleep apnea            Renal/:  Renal/ Normal                 Hepatic/GI:   PUD,  GERD   Ulcerative colitis             Musculoskeletal:  Musculoskeletal Normal                Neurological:  Neurology Normal                                      Endocrine:  Endocrine Normal            Psych:  Psychiatric Normal                    Physical Exam  General: Well nourished    Airway:  Mallampati: II   Mouth Opening: Normal  TM Distance: Normal  Tongue: Normal  Neck ROM: Normal ROM    Dental:  Dentures    Chest/Lungs:  Clear to auscultation, Normal Respiratory Rate    Heart:  Rate: Normal  Rhythm: Regular Rhythm        Anesthesia Plan  Type of Anesthesia, risks & benefits discussed:    Anesthesia Type: Gen Natural Airway  Intra-op Monitoring Plan: Standard ASA Monitors  Post Op Pain Control Plan: IV/PO Opioids PRN and multimodal analgesia  Induction:  IV  Informed Consent: Informed consent signed with the Patient and all parties understand the risks and agree with anesthesia plan.  All questions answered.   ASA Score: 3  Anesthesia Plan Notes: General with natural airway.    Propofol  POM    Ready For Surgery From Anesthesia Perspective.     .

## 2025-01-06 NOTE — PROGRESS NOTES
Cancer Genetics  Hereditary and High-Risk Clinic  Department of Hematology and Oncology  [facility]    Date of Service:  25  Visit Provider:  Eli Castañeda MS, [facility]    Patient ID  Name: Alfie Olson    : 1940    MRN: [id number]      Referring Provider  Lacy Dunne MD  [address] [place] [address]      Face-to-face time with patient:  Approximately 56 minutes.    Approximately 93 minutes in total were spent on this encounter, which includes face-to-face time and non-face-to-face time preparing to see the patient (e.g., review of records and tests), obtaining and/or reviewing separately obtained history, documenting clinical information in the electronic or other health record, independently interpreting results (not separately reported) and communicating results to the patient/family/caregiver, or care coordination (not separately reported).      IMPRESSION      Alfie Olson is a pleasant 84 y.o. male patient seen in genetic counseling given his personal and family history of pancreatic cancer. Alfie Olson was accompanied today by his son. Mr. Olson shared that worrying about insurance coverage has been the most stressful part of his diagnosis. We discussed different causes of cancer. Mr. Olson shared that his brother with pancreatic cancer did not live a healthy lifestyle. Additionally, Mr. Olson shared that he has worked in the [employer] industry for many years. We discussed that lifestyle and exposures could contribute to an individual's cancer, however given that both he and his brother have had pancreatic cancer, it is suspicious for an underlying hereditary predisposition. There is only cancer among Mr. Olson and two of his siblings, with many unaffected relatives as well. However, there is limited information regarding his paternal family history. He meets NCCN criteria for genetic testing based on both his and his brother's  "diagnosis of pancreatic cancer. He elected to proceed with testing today through the [facility] DNA+RNA panel. A blood sample was collected today 1/7/2025.     FOCUSED PERSONAL HISTORY     Chief Complaint: Genetic Evaluation (Personal and family history of pancreatic cancer)    History of Present Illness (HPI):  Alfie Olson ("Yakov"), 84 y.o., assigned male sex at birth, is established with the [facility] but new to me.  He was referred by Medical Oncology for hereditary cancer risk assessment given his recent diagnosis of pancreatic cancer.     Cancer History  Basal Cell Carcinoma  Pancreatic Cancer  CT chest on 11/27/2024: Interval development of numerous spiculated pulmonary nodules and masses scattered throughout the bilateral lungs.  Findings are highly concerning for malignancy/metastatic disease, however differential also includes septic emboli.  Recommend correlation with tissue sampling.  2. Redemonstration of scattered bilateral calcified and noncalcified pleural plaques.  3. Mild bilateral peripheral predominant interstitial reticulation and ground-glass density suggestive for interstitial lung disease.  4. Trace left pleural fluid.  5. Several hypodense lesions in the pancreatic body/tail which could represent cysts or cystic neoplasm.  Recommend further evaluation with contrast enhanced MRI MRCP pancreatic mass protocol.     PET scan on 11/27/2024: . Innumerable pleural and parenchymal bilateral lung nodules with FDG uptake suggestive of metastatic disease or mesothelioma.  2. Nodule in the left axilla subcutaneous tissues with low level uptake is indeterminate.  Direct visualization warranted.  3. Partially calcified mildly enlarged mediastinal lymph nodes with low level uptake are favored to be inflammatory.     MRI/MRCP on 12/11/2024: Probable pancreatic pseudocyst or side duct ectasia in the pancreatic tail.  No suspicious pancreatic lesions.     He " underwent biopsy on 12/12/2024. Pathology reveals LINGULA, BIOPSY:--POSITIVE FOR ADENOCARCINOMA.      2. LUNG, RIGHT MIDDLE LOBE NODULE, BIOPSY:--POSITIVE FOR ADENOCARCINOMA.     Oncology History   Malignant neoplasm of body of pancreas   12/26/2024 Initial Diagnosis    Adenocarcinoma, metastatic     12/26/2024 Cancer Staged    Staging form: Exocrine Pancreas, AJCC 8th Edition  - Clinical: Stage IV (cT1c, cN0, cM1)     1/13/2025 -  Chemotherapy    Treatment Summary   Plan Name: OP GI modified nab-PACLitaxel + gemcitabine Q4W  Treatment Goal: Palliative  Status: Active  Start Date: 1/13/2025 (Planned)  End Date: 6/16/2025 (Planned)  Provider: Lacy Dunne MD  Chemotherapy: gemcitabine (GEMZAR) 1,950 mg in 0.9% NaCl 269.5 mL chemo infusion, 1,000 mg/m2 = 1,950 mg, Intravenous, Clinic/HOD 1 time, 0 of 6 cycles        Focused Medical History  Previous germline cancer genetic testing:  No  Colonoscopy: Yes  Most recent colonoscopy: 7/25/2024  Colon polyp:  Yes - history of colon polyps   Pancreatitis:  No    Tobacco Use  Tobacco Use: Medium Risk (1/3/2025)    Patient History     Smoking Tobacco Use: Former     Smokeless Tobacco Use: Never     Passive Exposure: Not on file       Review of Systems   Patient's Stress Score today was 1-2/10 (with 10 being the worst).  Patient attributes this to worries about things getting approved by his insurance.      FAMILY HISTORY     Cancer Pedigree    Brother with pancreatic cancer at 54  Sister with liver cancer at 83    Maternal:  No known history of cancer    Paternal:  No known history of cancer, limited information    A family history of birth defects, intellectual disability, SIDS, sudden early death, multiple miscarriages and consanguinity were denied. Please refer to above pedigree for further details. A larger copy has been scanned in the Media tab.     DISCUSSION     Causes of Cancer:    Cancer occurs when cells grow out of control. Some genes help protect against cancer  by controlling the growth of cells. However, mutations (problems) in these genes can prevent them from working properly, increasing the risk of developing cancer.  Sporadic Cancer: Most people who get cancer have sporadic cancer. Sporadic cancer is caused by mutations that occur during the lifetime. These mutations may be caused by aging, environmental exposures (ex. UV radiation, carcinogens), lifestyle (ex. smoking, drinking alcohol, sunbathing, poor diet), other medical conditions (ex. hepatitis, HPV, ulcerative colitis), or other factors.   Hereditary Cancer: A small percentage (5-10%) of people who develop cancer were born with a mutation already in one of the cancer protection genes.  Familial Cancer: Cancer can also cluster in families that do not have an identifiable mutation. This may be due to shared environmental or lifestyle factors or genetic risk factors that have not been identified or cannot be detected using current technology or panels.     Approximately 5-10% of cancers are hereditary. Things that make us suspicious of a hereditary cause of cancer include individuals with rare tumors and cancers, the type of cancers seen in the family, number of people with cancer, ages of people with cancer, and certain cancer pathologies.The majority of hereditary pancreatic cancer is caused by mutations in ANASTASIYA, BRCA1, BRCA2, CDKN2A, LS genes [MLH1, MSH2, MSH6, EPCAM], PALB2, STK11, and TP53). Yakov meets NCCN criteria for genetic testing based on both his and his brother's diagnosis of pancreatic cancer.Therefore, he was offered phenotype-driven and broad panel testing. Yakov opted for the [facility] DNA+RNA  panel through 36 of the following  genes associated with hereditary colorectal,      cancers:    APC, ANASTASIYA, AXIN2, BAP1, BARD1, BMPR1A, BRCA1, BRCA2, BRIP1, CDH1, CDK4, CDKN2A, CHEK2, EPCAM, FH, FLCN, GREM1, HOXB13, MBD4, MET, MLH1, MSH2, MSH3, MSH6, MUTYH, NF1, NTHL1, PALB2, PMS2, POLD1, POLE, PTEN, RAD51C,  RAD51D, SMAD4, STK11, TP53, TSC1, TSC2, VHL     We reviewed that mutations in the highly penetrant genes put an individual at a significantly increased risk of pancreatic cancer and other cancers.  There are established screening and surgery guidelines for these syndromes. Mutations in the moderately penetrant genes increase the risk of pancreatic and other cancers, but less is understood regarding their role in cancer risk. There may not be standard screening or management guidelines for individuals who have mutations in these genes.     Furthermore, we discussed the psychosocial implications of a positive result, including anxiety, fear and guilt if a mutation is passed on to a child. Yakov did not express concern.    Possible Results:    Positive (pathogenic or likely pathogenic variant): A genetic variant was found that is suspected or known to impact the function of the gene. The impact of a positive result on an individual's risk of cancer varies based on the gene, specific variant, individual's sex assigned at birth, personal cancer history, other health history (such as surgical history), and family history. A positive result can impact screening and risk management recommendations. However, there are not always available guidelines for management based on a specific gene variant. Family history and personal risk factors should always be considered. Sometimes, a positive result can also have treatment or reproductive implications.   Negative: No clinically significant variants were reported in the tested genes. A negative result does not indicate that an individual cannot develop cancer or even that the individual is at average risk. An individual may still be at an increased risk for cancer based on personal risk factors or family history. Additionally, there could be a hereditary cancer predisposition that was not included in a chosen panel or is not detected with current technology.   Variant of Uncertain  Significance (VUS): A variant was found. However, the lab does not have enough information to determine if the variant is benign (harmless) or pathogenic (impacts the function of the gene). The laboratory may update (reclassify) the variant over time as more information becomes available. When reclassified, most variants of uncertain significance are reclassified to benign/likely benign. Typically, it is not recommended to  based on the presence of a VUS. The chance of finding a VUS varies based on the test performed. Generally, the chance of finding a VUS increases with the number of genes tested and decreases with the amount of testing of that gene (genes that are tested more frequently or for a longer period of time have a lower VUS rate).    Genetic Mutation Inheritance:  When an individual has a gene mutation, their first-degree relatives (parents, children, and siblings) each have a 50% chance of carrying the same mutation. Other, more distant blood relatives can also be at risk of carrying the same mutation. At-risk relatives of an individual with a mutation should consider genetic testing to help determine their risk for cancer.     Genetic Discrimination: The Genetic Information Nondiscrimination Act of 2008 (AUTUMN) is a [place] federal law that provides some protections against the use of an individual's genetic information by their health insurer and by their employer. Title I of AUTUMN prohibits most health insurers (except for insurance obtained through a job with the  or the Federal Employees Health Benefits Plan) from utilizing an individual's genetic information to make decisions regarding insurance eligibility or premium charges. Title II of AUTUMN prohibits covered entities from requesting or requiring the genetic information of employees and applicants and from using said information to make employment decisions. This does not apply to employers with fewer than 15 employees or to  "the .  AUTUMN also does not protect individuals from genetic discrimination by any other type of policy or entity, including but not limited to life insurance, disability insurance, long-term care insurance,  benefits, and  Health Services benefits.    There is also a possibility for the patient to incur out-of-pocket costs related to this testing. Yakov appeared to have a good understanding of the information as she asked appropriate questions.  Yakov received comprehensive counseling regarding panel testing and has elected to proceed with this testing. A sample was scheduled to be submitted on 1/7/2025 to [facility].  Yakov's results should be available in approximately 3 weeks.  In the meantime, he is welcome to contact me if he has any questions, concerns, or updates to his family history.       ASSESSMENT / PLAN      Alfie Olson ("Yakov"), 84 y.o., presented today for hereditary cancer risk assessment and genetic counseling given his personal and family history of pancreatic cancer:  His brother with pancreatic cancer is suspicious, however we discussed other lifestyle/exposure factors that may have been relevant  He meets NCCN criteria based on both his diagnosis and his brother's diagnosis of pancreatic cancer  He elected to proceed with the [facility]Next DNA+RNA panel  A sample was collected today, I will call him in ~3 weeks when results are ready        ICD-10-CM ICD-9-CM   1. Malignant neoplasm of body of pancreas  C25.1 157.1   2. Family history of pancreatic cancer  Z80.0 V16.0   3. Family history of liver cancer  Z80.0 V16.0     1. Malignant neoplasm of body of pancreas  - Ambulatory referral/consult to Genetics  - Genetic Misc Sendout Test, Blood; Future    2. Family history of pancreatic cancer  - Genetic Misc Sendout Test, Blood; Future    3. Family history of liver cancer  - Genetic Misc Sendout Test, Blood; Future       Genetic Test Information  Testing lab: [facility]   Test " panel: CancerNext DNA+RNA     ICD-10 code(s): C25.1, Z80.0   Verbal informed consent: Obtained   Written informed consent: Obtained   Specimen type: Blood  (Patient denies blood disorders that would necessitate a skin fibroblast specimen)   Specimen collection by: [facility]   Specimen collection date: 01/07/2025   Results expected by: Approximately 2-3 weeks after the genetic testing lab receives the specimen   Results disclosure plan: Post-test visit if positive or complex result; otherwise, results will be communicated through phone call       Follow-up:  No follow-ups on file.    Questions were encouraged and answered to the patient's satisfaction, and he verbalized understanding of the information and agreement with the plan.         Approximately 56 minutes were spent face-to-face with the patient.  Approximately 93 minutes in total were spent on this encounter, which includes face-to-face time and non-face-to-face time preparing to see the patient (e.g., review of tests), obtaining and/or reviewing separately obtained history, documenting clinical information in the electronic or other health record, independently interpreting results (not separately reported) and communicating results to the patient/family/caregiver, or care coordination (not separately reported).     This assessment is based on the history and reports provided, as well as the current scientific knowledge regarding cancer genetics.         Eli Castañeda MS, [facility] [place]etic Counselor, Hereditary and High-Risk Clinic  Department of Hematology and Oncology  [facility]        CC:  Dr. Lacy Dunne

## 2025-01-07 ENCOUNTER — LAB VISIT (OUTPATIENT)
Dept: LAB | Facility: HOSPITAL | Age: 85
End: 2025-01-07
Attending: STUDENT IN AN ORGANIZED HEALTH CARE EDUCATION/TRAINING PROGRAM
Payer: MEDICARE

## 2025-01-07 ENCOUNTER — OFFICE VISIT (OUTPATIENT)
Dept: HEMATOLOGY/ONCOLOGY | Facility: CLINIC | Age: 85
End: 2025-01-07
Payer: MEDICARE

## 2025-01-07 DIAGNOSIS — Z80.0 FAMILY HISTORY OF LIVER CANCER: ICD-10-CM

## 2025-01-07 DIAGNOSIS — C25.1 MALIGNANT NEOPLASM OF BODY OF PANCREAS: Primary | ICD-10-CM

## 2025-01-07 DIAGNOSIS — Z80.0 FAMILY HISTORY OF PANCREATIC CANCER: ICD-10-CM

## 2025-01-07 DIAGNOSIS — C25.1 MALIGNANT NEOPLASM OF BODY OF PANCREAS: ICD-10-CM

## 2025-01-07 PROCEDURE — 99999 PR PBB SHADOW E&M-EST. PATIENT-LVL I: CPT | Mod: PBBFAC,,, | Performed by: BEHAVIOR TECHNICIAN

## 2025-01-07 PROCEDURE — 99499 UNLISTED E&M SERVICE: CPT | Mod: S$PBB,,, | Performed by: BEHAVIOR TECHNICIAN

## 2025-01-07 PROCEDURE — 99211 OFF/OP EST MAY X REQ PHY/QHP: CPT | Mod: PBBFAC,PN | Performed by: BEHAVIOR TECHNICIAN

## 2025-01-07 PROCEDURE — 96041 GENETIC COUNSELING SVC EA 30: CPT | Mod: ,,, | Performed by: BEHAVIOR TECHNICIAN

## 2025-01-07 PROCEDURE — 36415 COLL VENOUS BLD VENIPUNCTURE: CPT | Mod: PN

## 2025-01-10 ENCOUNTER — TELEPHONE (OUTPATIENT)
Dept: HEMATOLOGY/ONCOLOGY | Facility: CLINIC | Age: 85
End: 2025-01-10
Payer: OTHER GOVERNMENT

## 2025-01-10 ENCOUNTER — OFFICE VISIT (OUTPATIENT)
Dept: HEPATOLOGY | Facility: CLINIC | Age: 85
End: 2025-01-10
Payer: MEDICARE

## 2025-01-10 ENCOUNTER — PROCEDURE VISIT (OUTPATIENT)
Dept: HEPATOLOGY | Facility: CLINIC | Age: 85
End: 2025-01-10
Payer: MEDICARE

## 2025-01-10 VITALS
SYSTOLIC BLOOD PRESSURE: 123 MMHG | WEIGHT: 174.38 LBS | BODY MASS INDEX: 26.43 KG/M2 | HEIGHT: 68 IN | RESPIRATION RATE: 18 BRPM | OXYGEN SATURATION: 97 % | HEART RATE: 86 BPM | DIASTOLIC BLOOD PRESSURE: 61 MMHG

## 2025-01-10 DIAGNOSIS — R93.2 ABNORMAL LIVER ULTRASOUND: ICD-10-CM

## 2025-01-10 DIAGNOSIS — Z86.39 HISTORY OF OBESITY: ICD-10-CM

## 2025-01-10 DIAGNOSIS — C25.1 MALIGNANT NEOPLASM OF BODY OF PANCREAS: Primary | ICD-10-CM

## 2025-01-10 DIAGNOSIS — K51.018 ULCERATIVE PANCOLITIS WITH OTHER COMPLICATION: ICD-10-CM

## 2025-01-10 PROCEDURE — 99999 PR PBB SHADOW E&M-EST. PATIENT-LVL V: CPT | Mod: PBBFAC,,, | Performed by: INTERNAL MEDICINE

## 2025-01-10 PROCEDURE — 99215 OFFICE O/P EST HI 40 MIN: CPT | Mod: PBBFAC | Performed by: INTERNAL MEDICINE

## 2025-01-10 NOTE — PROGRESS NOTES
Hepatology Consult Note    Referring provider: Arelis Marin  PCP: Griselda Leal MD    Chief complaint: abnormal ultrasound of the abdomen    HPI:  Alfie Olson is a 84 y.o. 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 Accompanied by son, Jan.     The patient denies prior diagnosis of liver disease, fatty liver, cirrhosis.     Regarding risk factors for liver disease, the patient denies prior history of EtOH abuse (last had alcohol >40 years ago), illicit drug use, blood transfusions, prior tattoos. MASLD: prior obesity (heaviest weight ~220lb back in 2000), HLD, JUAN DIEGO. Denies history of DM, HTN. States sister was diagnosed with cancer in the liver, but does not know what type of cancer she had. Denies family history of cirrhosis.     The patient denies prior episodes of jaundice. The patient denies prior evaluation by hepatologist, liver biopsy, paracentesis.    Denies prior upper abdominal surgeries.    Past Medical History:   Diagnosis Date    Anticoagulant long-term use     Arthritis     Asthma     B12 deficiency     Cholelithiasis     Chronic back pain     Chronic diarrhea     Colon polyp     COPD (chronic obstructive pulmonary disease)     Diverticulosis     Full dentures     GERD (gastroesophageal reflux disease)     Glaucoma     left eye    History of fall     Hyperlipemia     JUAN DIEGO (obstructive sleep apnea)     denies CPAP use    Skin cancer     Basal cell    Ulcerative colitis 1961    Vitamin D deficiency     Wrist fracture 11/2024       Past Surgical History:   Procedure Laterality Date    BACK SURGERY      CATARACT EXTRACTION W/ INTRAOCULAR LENS IMPLANT Right     COLONOSCOPY  2014    COLONOSCOPY  03/25/2015    diverticulosis, otherwise normal findings, biopsies taken from 4 quadrants- see media section for biopsy report, repeat in 1 year     COLONOSCOPY N/A 2016    Procedure: COLONOSCOPY;  Surgeon: Ravindra Ervin MD;  Location: [facility] ENDO;  Service: Endoscopy;  Laterality: N/A;    COLONOSCOPY N/A 2022    Procedure: COLONOSCOPY;  Surgeon: Lencho Ivory MD;  Location: [facility] ENDO;  Service: Endoscopy;  Laterality: N/A;    COLONOSCOPY N/A 2023    Procedure: COLONOSCOPY;  Surgeon: Lencho Ivory MD;  Location: [facility] ENDO;  Service: Endoscopy;  Laterality: N/A;    COLONOSCOPY N/A 2024    Procedure: COLONOSCOPY;  Surgeon: Ravindra Ervin MD;  Location: [facility];  Service: Gastroenterology;  Laterality: N/A;    CORONARY STENT PLACEMENT      HERNIA REPAIR      rt inguinal    INSERTION OF TUNNELED CENTRAL VENOUS CATHETER (CVC) WITH SUBCUTANEOUS PORT Right 1/3/2025    Procedure: [id number]-[id number];  Surgeon: Adiel Alejo MD;  Location: [facility] OR;  Service: General;  Laterality: Right;    LUMBAR FUSION      L3,4,5    MULTIPLE TOOTH EXTRACTIONS      pain pump placement  2010    dilaudid/baclofen    ROBOTIC BRONCHOSCOPY Bilateral 2024    Procedure: ROBOTIC BRONCHOSCOPY;  Surgeon: Gregg White MD;  Location: [facility] OR;  Service: Pulmonary;  Laterality: Bilateral;    SKIN BIOPSY      SKIN CANCER EXCISION      UPPER GASTROINTESTINAL ENDOSCOPY  10/26/2009    hiatal hernia       Family History   Problem Relation Name Age of Onset    Liver cancer Sister  83    Pancreatic cancer Brother  54    Stroke Mother          X2    Heart attack Father          X3    Alzheimer's disease Maternal Grandfather      Ulcerative colitis Son  20 - 29    Multiple sclerosis Brother  37    Heart attack Brother  54    Colon cancer Neg Hx      Colon polyps Neg Hx         Social History     Tobacco Use    Smoking status: Former     Current packs/day: 0.00     Types: Cigarettes     Quit date:      Years since quittin.0    Smokeless tobacco: Never   Substance Use Topics    Alcohol use: No    Drug use: No       Current  Outpatient Medications   Medication Sig Dispense Refill    acetaminophen (TYLENOL) 500 MG tablet Take 1 tablet by mouth 4 (four) times daily as needed.      albuterol (PROVENTIL) 5 mg/mL nebulizer solution Take 2.5 mg by nebulization every 6 (six) hours as needed for Wheezing.      ammonium lactate (LAC-HYDRIN) 12 % lotion Apply topically.      amoxicillin-clavulanate 875-125mg (AUGMENTIN) 875-125 mg per tablet Take 1 tablet by mouth 2 (two) times daily. 14 tablet 0    cholecalciferol, vitamin D3, (VITAMIN D3) 25 mcg (1,000 unit) capsule Take 1,000 Units by mouth.      cholestyramine, with sugar, 4 gram Powd Take 4 g by mouth 2 (two) times a day.      clopidogreL (PLAVIX) 75 mg tablet 75 mg.      CYANOCOBALAMIN, VITAMIN B-12, (VITAMIN B-12 ORAL) Take by mouth.      famotidine (PEPCID) 40 MG tablet Take 40 mg by mouth nightly as needed.      fluorouraciL (EFUDEX) 5 % cream Apply topically.      fluticasone-salmeterol diskus inhaler 250-50 mcg Inhale 1 puff into the lungs Daily.      folic acid (FOLVITE) 1 MG tablet Take 1 mg by mouth once daily.      intrathecal pain pump compound 1 mL by Intrathecal route once. Medtronic pump  400mcg/ml   100mcg/ml  194.51mcg dilaudid/day  48.63mcg baclofen/day  Refill q71 days      ketoconazole (NIZORAL) 2 % cream       latanoprost 0.005 % ophthalmic solution Place 1 drop into both eyes every evening.      LIDOcaine-prilocaine (EMLA) cream Apply topically as needed (apply to port 30 min prior to chemo). 30 g 0    lipase-protease-amylase (CREON) 36,000-114,000- 180,000 unit CpDR Take 1 capsule by mouth 3 (three) times daily with meals. 90 capsule 2    montelukast (SINGULAIR) 10 mg tablet Take 10 mg by mouth.      multivitamin (THERAGRAN) per tablet Take 1 tablet by mouth once daily.      prochlorperazine (COMPAZINE) 5 MG tablet Take 1 tablet (5 mg total) by mouth every 6 (six) hours as needed. (Patient taking differently: Take 5 mg by mouth every 6 (six) hours as needed. NEVER  "STARTED) 60 tablet 3    rosuvastatin (CRESTOR) 5 MG tablet Take 5 mg by mouth once daily.      sodium chloride 5% (MARTHA 128) 5 % ophthalmic solution Apply to eye.      tamsulosin (FLOMAX) 0.4 mg Cap Take 0.4 mg by mouth once daily.      budesonide-formoterol 160-4.5 mcg (SYMBICORT) 160-4.5 mcg/actuation HFAA Inhale 2 puffs into the lungs every 12 (twelve) hours.       No current facility-administered medications for this visit.       Review of patient's allergies indicates:  No Known Allergies         Vitals:    01/10/25 0800   BP: 123/61   Pulse: 86   Resp: 18   SpO2: 97%   Weight: 79.1 kg (174 lb 6.1 oz)   Height: 5' 8" (1.727 m)     Body mass index is 26.51 kg/m².    Physical Exam  Constitutional:       General: He is not in acute distress.  Eyes:      General: No scleral icterus.  Cardiovascular:      Rate and Rhythm: Normal rate.   Pulmonary:      Effort: Pulmonary effort is normal.   Abdominal:      General: Abdomen is protuberant. There is no distension.      Palpations: Abdomen is soft. There is no fluid wave, hepatomegaly or splenomegaly.      Tenderness: There is abdominal tenderness in the right upper quadrant.   Musculoskeletal:      Right lower leg: No edema.      Left lower leg: No edema.   Skin:     General: Skin is warm.      Comments: No spider angiomas. No palmar erythema. No leukonychia.    Neurological:      General: No focal deficit present.      Mental Status: He is alert and oriented to person, place, and time.   Psychiatric:         Behavior: Behavior is cooperative.         Thought Content: Thought content normal.         LABS: I personally reviewed pertinent laboratory findings.    Lab Results   Component Value Date    WBC 13.20 (H) 12/29/2024    HGB 11.6 (L) 12/29/2024    HCT 36.1 (L) 12/29/2024    MCV 89 12/29/2024     12/29/2024       Lab Results   Component Value Date     12/29/2024    K 3.9 12/29/2024     12/29/2024    CO2 27 12/29/2024    BUN 13 12/29/2024    " "CREATININE 0.85 12/29/2024    CALCIUM 9.4 12/29/2024    ANIONGAP 6 (L) 12/29/2024       Lab Results   Component Value Date    ALT 10 12/29/2024    AST 24 12/29/2024    ALKPHOS 55 12/29/2024    BILITOT 0.8 12/29/2024       Lab Results   Component Value Date    HEPBCAB Non-reactive 10/23/2024    HEPCAB Negative 08/25/2022       No results found for: "HERMELINDA", "MITOAB", "SMOOTHMUSCAB", "IGG", "CERULOPLSM"     Computed MELD 3.0 unavailable. One or more values for this score either were not found within the given timeframe or did not fit some other criterion.  Computed MELD-Na unavailable. One or more values for this score either were not found within the given timeframe or did not fit some other criterion.     FIB-4 Calculation: 1.56 at 12/29/2024 11:15 AM  Calculated from:  SGOT/AST: 24 U/L at 12/29/2024 11:15 AM  SGPT/ALT: 10 U/L at 12/29/2024 11:15 AM  Platelets: 400 K/uL at 12/29/2024 11:15 AM  Age: 84 years     FIB-4 below 1.30 is considered as low-risk for advanced fibrosis  FIB-4 over 2.67 is considered as high-risk for advanced fibrosis  FIB-4 values between 1.30 and 2.67 are considered as intermediate-risk of advanced fibrosis for ages 36-64.     For ages > 64 the cut-off for low-risk goes to < 2.  This is a screening tool and clinical judgement should be used in the interpretation of these results.    IMAGING: I personally reviewed imaging studies.    Assessment:  Alfie Olson is a 84 y.o. 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 Per chart review, liver enzymes and bilirubin have been within normal limits in our system dating back to 2022. In Care Everywhere, liver enzymes and bilirubin have been normal dating back to 2017. Platelet count is within normal limits. On MRI abd w/wo contrast and MRCP (12/11/24), there are no acute findings in liver or " spleen. On US abdomen (11/29/24), there is mentioned of coarsened hepatic echotexture, spleen is normal, no ascites. On outside CT A/P w/o contrast (2018), imaged portions of the liver and spleen are unremarkable.     Unclear significance of coarsened hepatic echotexture seen on US in Nov 2024. At this point, there is no convincing data to support underlying liver disease. Plan to obtain FibroScan today.     1. Abnormal liver ultrasound    2. History of obesity    3. Ulcerative pancolitis with other complication      Recommendations:  - FibroScan today   - Maintain an ideal weight   - Mediterranean diet     Return to clinic in 3 months.    I have sent communication to the referring physician and/or primary care provider.    Nina Boss MD  Staff Physician  Hepatology and Liver Transplant  [facility]  [facility] [place]

## 2025-01-10 NOTE — NURSING
Spoke with patient, treatment authorized, scheduled labs/fu with Dr. Dunne and Infusions. Patient verbalized agreement to times/dates/location. Followed up with Dr. Dunne and Bang will continue and is being followed by GI. All questions and concerns addressed at this time. Will continue to follow.

## 2025-01-10 NOTE — PROCEDURES
FibroScan Transplant Hepatology    Date/Time: 1/10/2025 8:45 AM    Performed by: Kimberly Meyers MA  Authorized by: Nina Boss MD    Diagnosis:  NAFLD    Probe:  M    Universal Protocol: Patient's identity, procedure and site were verified, confirmatory pause was performed.  Discussed procedure including risks and potential complications.  Questions answered.  Patient verbalizes understanding and wishes to proceed with VCTE.     Procedure: After providing explanations of the procedure, patient was placed in the supine position with right arm in maximum abduction to allow optimal exposure of right lateral abdomen.  Patient was briefly assessed, Testing was performed in the mid-axillary location, 50Hz Shear Wave pulses were applied and the resulting Shear Wave and Propagation Speed detected with a 3.5 MHz ultrasonic signal, using the FibroScan probe, Skin to liver capsule distance and liver parenchyma were accessed during the entire examination with the FibroScan probe, Patient was instructed to breathe normally and to abstain from sudden movements during the procedure, allowing for random measurements of liver stiffness. At least 10 Shear Waves were produced, Individual measurements of each Shear Wave were calculated.  Patient tolerated the procedure well with no complications.  Meets discharge criteria as was dismissed.  Rates pain 0 out of 10.  Patient will follow up with ordering provider to review results.    Findings  Median liver stiffness score:  3.4  CAP Reading: dB/m:  239    IQR/med %:  10  Interpretation  Fibrosis interpretation is based on medial liver stiffness - Kilopascal (kPa).    Fibrosis Stage:  F 0-1  Steatosis interpretation is based on controlled attenuation parameter - (dB/m).    Steatosis Grade:  <S1

## 2025-01-14 ENCOUNTER — LAB VISIT (OUTPATIENT)
Dept: LAB | Facility: HOSPITAL | Age: 85
End: 2025-01-14
Attending: INTERNAL MEDICINE
Payer: MEDICARE

## 2025-01-14 ENCOUNTER — OFFICE VISIT (OUTPATIENT)
Dept: HEMATOLOGY/ONCOLOGY | Facility: CLINIC | Age: 85
End: 2025-01-14
Payer: OTHER GOVERNMENT

## 2025-01-14 VITALS
DIASTOLIC BLOOD PRESSURE: 65 MMHG | BODY MASS INDEX: 26.6 KG/M2 | TEMPERATURE: 98 F | HEART RATE: 79 BPM | HEIGHT: 68 IN | WEIGHT: 175.5 LBS | RESPIRATION RATE: 16 BRPM | OXYGEN SATURATION: 95 % | SYSTOLIC BLOOD PRESSURE: 118 MMHG

## 2025-01-14 DIAGNOSIS — T45.1X5A IMMUNODEFICIENCY DUE TO CHEMOTHERAPY: ICD-10-CM

## 2025-01-14 DIAGNOSIS — C79.9 ADENOCARCINOMA, METASTATIC: ICD-10-CM

## 2025-01-14 DIAGNOSIS — C78.01 MALIGNANT NEOPLASM METASTATIC TO BOTH LUNGS: ICD-10-CM

## 2025-01-14 DIAGNOSIS — C25.1 MALIGNANT NEOPLASM OF BODY OF PANCREAS: ICD-10-CM

## 2025-01-14 DIAGNOSIS — C25.1 MALIGNANT NEOPLASM OF BODY OF PANCREAS: Primary | ICD-10-CM

## 2025-01-14 DIAGNOSIS — D84.821 IMMUNODEFICIENCY DUE TO CHEMOTHERAPY: ICD-10-CM

## 2025-01-14 DIAGNOSIS — Z79.899 IMMUNODEFICIENCY DUE TO CHEMOTHERAPY: ICD-10-CM

## 2025-01-14 DIAGNOSIS — K51.00 ULCERATIVE PANCOLITIS WITHOUT COMPLICATION: ICD-10-CM

## 2025-01-14 DIAGNOSIS — C78.02 MALIGNANT NEOPLASM METASTATIC TO BOTH LUNGS: ICD-10-CM

## 2025-01-14 LAB
ALBUMIN SERPL BCP-MCNC: 3.3 G/DL (ref 3.5–5.2)
ALP SERPL-CCNC: 58 U/L (ref 40–150)
ALT SERPL W/O P-5'-P-CCNC: 18 U/L (ref 10–44)
ANION GAP SERPL CALC-SCNC: 11 MMOL/L (ref 8–16)
AST SERPL-CCNC: 22 U/L (ref 10–40)
BILIRUB SERPL-MCNC: 0.5 MG/DL (ref 0.1–1)
BUN SERPL-MCNC: 14 MG/DL (ref 8–23)
CALCIUM SERPL-MCNC: 8.9 MG/DL (ref 8.7–10.5)
CHLORIDE SERPL-SCNC: 103 MMOL/L (ref 95–110)
CO2 SERPL-SCNC: 25 MMOL/L (ref 23–29)
CREAT SERPL-MCNC: 0.9 MG/DL (ref 0.5–1.4)
ERYTHROCYTE [DISTWIDTH] IN BLOOD BY AUTOMATED COUNT: 13.7 % (ref 11.5–14.5)
EST. GFR  (NO RACE VARIABLE): >60 ML/MIN/1.73 M^2
GLUCOSE SERPL-MCNC: 136 MG/DL (ref 70–110)
HCT VFR BLD AUTO: 34.9 % (ref 40–54)
HGB BLD-MCNC: 11.3 G/DL (ref 14–18)
IMM GRANULOCYTES # BLD AUTO: 0.04 K/UL (ref 0–0.04)
MCH RBC QN AUTO: 29 PG (ref 27–31)
MCHC RBC AUTO-ENTMCNC: 32.4 G/DL (ref 32–36)
MCV RBC AUTO: 90 FL (ref 82–98)
NEUTROPHILS # BLD AUTO: 7.8 K/UL (ref 1.8–7.7)
PLATELET # BLD AUTO: 323 K/UL (ref 150–450)
PMV BLD AUTO: 9.6 FL (ref 9.2–12.9)
POTASSIUM SERPL-SCNC: 4 MMOL/L (ref 3.5–5.1)
PROT SERPL-MCNC: 6.9 G/DL (ref 6–8.4)
RBC # BLD AUTO: 3.89 M/UL (ref 4.6–6.2)
SODIUM SERPL-SCNC: 139 MMOL/L (ref 136–145)
WBC # BLD AUTO: 12.47 K/UL (ref 3.9–12.7)

## 2025-01-14 PROCEDURE — 36415 COLL VENOUS BLD VENIPUNCTURE: CPT | Mod: PN | Performed by: INTERNAL MEDICINE

## 2025-01-14 PROCEDURE — 85027 COMPLETE CBC AUTOMATED: CPT | Mod: PN | Performed by: INTERNAL MEDICINE

## 2025-01-14 PROCEDURE — G2211 COMPLEX E/M VISIT ADD ON: HCPCS | Mod: S$PBB,,, | Performed by: INTERNAL MEDICINE

## 2025-01-14 PROCEDURE — 99215 OFFICE O/P EST HI 40 MIN: CPT | Mod: S$PBB,,, | Performed by: INTERNAL MEDICINE

## 2025-01-14 PROCEDURE — 80053 COMPREHEN METABOLIC PANEL: CPT | Mod: PN | Performed by: INTERNAL MEDICINE

## 2025-01-14 PROCEDURE — 99215 OFFICE O/P EST HI 40 MIN: CPT | Mod: PBBFAC,PN | Performed by: INTERNAL MEDICINE

## 2025-01-14 PROCEDURE — 99999 PR PBB SHADOW E&M-EST. PATIENT-LVL V: CPT | Mod: PBBFAC,,, | Performed by: INTERNAL MEDICINE

## 2025-01-14 RX ORDER — PROCHLORPERAZINE MALEATE 5 MG
5 TABLET ORAL EVERY 6 HOURS PRN
Qty: 20 TABLET | Refills: 3 | Status: SHIPPED | OUTPATIENT
Start: 2025-01-14 | End: 2026-01-14

## 2025-01-14 RX ORDER — ONDANSETRON HYDROCHLORIDE 2 MG/ML
8 INJECTION, SOLUTION INTRAVENOUS
Status: CANCELLED | OUTPATIENT
Start: 2025-01-15

## 2025-01-14 RX ORDER — PROCHLORPERAZINE EDISYLATE 5 MG/ML
5 INJECTION INTRAMUSCULAR; INTRAVENOUS ONCE AS NEEDED
Status: CANCELLED | OUTPATIENT
Start: 2025-01-15

## 2025-01-14 RX ORDER — SODIUM CHLORIDE 0.9 % (FLUSH) 0.9 %
10 SYRINGE (ML) INJECTION
Status: CANCELLED | OUTPATIENT
Start: 2025-01-15

## 2025-01-14 RX ORDER — DIPHENHYDRAMINE HYDROCHLORIDE 50 MG/ML
50 INJECTION INTRAMUSCULAR; INTRAVENOUS ONCE AS NEEDED
Status: CANCELLED | OUTPATIENT
Start: 2025-01-15

## 2025-01-14 RX ORDER — HEPARIN 100 UNIT/ML
500 SYRINGE INTRAVENOUS
Status: CANCELLED | OUTPATIENT
Start: 2025-01-15

## 2025-01-14 RX ORDER — EPINEPHRINE 0.3 MG/.3ML
0.3 INJECTION SUBCUTANEOUS ONCE AS NEEDED
Status: CANCELLED | OUTPATIENT
Start: 2025-01-15

## 2025-01-14 NOTE — PROGRESS NOTES
Subjective     Patient ID: Alfie Olson is a 84 y.o. male.    Chief Complaint: Follow-up (Malignant neoplasm of body of pancreas)  Mr. Olson underwent a CT of the abdomen 11/22/2024 for ulcerative colitis follow-up, which incidentally revealed new findings in the lower portion of the lungs. 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 CT of the chest from 2019 shows pleural-based nodules with calcifications consistent with asbestos-related pleural plaques.     Mr. Olson has ulcerative colitis managed with Entyvio infusions at the clinic. He also reports prior diagnoses of COPD and asthma, controlled with an inhaler. His cardiac history includes one stent placed 2-3 years ago, for which he takes Plavix. [facility]     CT chest on 11/27/2024: Interval development of numerous spiculated pulmonary nodules and masses scattered throughout the bilateral lungs.  Findings are highly concerning for malignancy/metastatic disease, however differential also includes septic emboli.  Recommend correlation with tissue sampling.  2. Redemonstration of scattered bilateral calcified and noncalcified pleural plaques.  3. Mild bilateral peripheral predominant interstitial reticulation and ground-glass density suggestive for interstitial lung disease.  4. Trace left pleural fluid.  5. Several hypodense lesions in the pancreatic body/tail which could represent cysts or cystic neoplasm.  Recommend further evaluation with contrast enhanced MRI MRCP pancreatic mass protocol.     PET scan on 11/27/2024: . Innumerable pleural and parenchymal bilateral lung nodules with FDG uptake suggestive of metastatic disease or mesothelioma.  2. Nodule in the left axilla subcutaneous tissues with low level uptake is indeterminate.  Direct visualization warranted.  3. Partially calcified mildly enlarged  "mediastinal lymph nodes with low level uptake are favored to be inflammatory.     MRI/MRCP on 12/11/2024: Probable pancreatic pseudocyst or side duct ectasia in the pancreatic tail.  No suspicious pancreatic lesions.     He underwent biopsy on 12/12/2024. Pathology reveals LINGULA, BIOPSY:--POSITIVE FOR ADENOCARCINOMA.      2. LUNG, RIGHT MIDDLE LOBE NODULE, BIOPSY:--POSITIVE FOR ADENOCARCINOMA.       Comment: The morphologic and immunophenotypic features are not entirely specific    but are most suggestive of pancreaticobiliary primary. Immunohistochemistry was performed with appropriate controls on block 1A based on the histopathologic findings and the results are summarized as  follows:     CK19: Positive. : Positive     HPIHe comes in to start palliative chemo with Gemzar and Abraxane   He went to the ED on 12/29 with hemoptysis. CTA chest on 12/29/24 revealed "A pulmonary embolus is not demonstrated. Multiple densities bilaterally with a differential of pneumonia and metastatic disease. Small left-sided pleural effusion"  He completed oral antibiotics and his hemoptysis has resolved     Review of Systems   Constitutional:  Negative for appetite change, fatigue and unexpected weight change.   HENT:  Negative for mouth sores.    Eyes:  Negative for visual disturbance.   Respiratory:  Negative for cough and shortness of breath.    Cardiovascular:  Negative for chest pain.   Gastrointestinal:  Negative for abdominal pain and diarrhea.   Genitourinary:  Negative for frequency.   Musculoskeletal:  Negative for back pain.   Integumentary:  Negative for rash.   Neurological:  Negative for headaches.   Hematological:  Negative for adenopathy.   Psychiatric/Behavioral:  The patient is not nervous/anxious.    All other systems reviewed and are negative.         Objective     Physical Exam         LABS:  WBC   Date Value Ref Range Status   01/14/2025 12.47 3.90 - 12.70 K/uL Final     Hemoglobin   Date Value Ref Range " Status   01/14/2025 11.3 (L) 14.0 - 18.0 g/dL Final     Hematocrit   Date Value Ref Range Status   01/14/2025 34.9 (L) 40.0 - 54.0 % Final     Platelets   Date Value Ref Range Status   01/14/2025 323 150 - 450 K/uL Final     Gran # (ANC)   Date Value Ref Range Status   01/14/2025 7.8 (H) 1.8 - 7.7 K/uL Final     Comment:     The ANC is based on a white cell differential from an   automated cell counter. It has not been microscopically   reviewed for the presence of abnormal cells. Clinical   correlation is required.         Chemistry        Component Value Date/Time     01/14/2025 1001    K 4.0 01/14/2025 1001     01/14/2025 1001    CO2 25 01/14/2025 1001    BUN 14 01/14/2025 1001    CREATININE 0.9 01/14/2025 1001     (H) 01/14/2025 1001        Component Value Date/Time    CALCIUM 8.9 01/14/2025 1001    ALKPHOS 58 01/14/2025 1001    AST 22 01/14/2025 1001    ALT 18 01/14/2025 1001    BILITOT 0.5 01/14/2025 1001          Assessment and Plan     1. Malignant neoplasm of body of pancreas  -     CBC w/ DIFF; Standing  -     CMP; Standing    2. Adenocarcinoma, metastatic  -     prochlorperazine (COMPAZINE) 5 MG tablet; Take 1 tablet (5 mg total) by mouth every 6 (six) hours as needed.  Dispense: 20 tablet; Refill: 3    3. Malignant neoplasm metastatic to both lungs    4. Immunodeficiency due to chemotherapy    5. Ulcerative pancolitis without complication    Other orders  -     pegfilgrastim (NEULASTA (ON BODY INJECTOR)) injection 6 mg  -     ondansetron injection 8 mg  -     PACLitaxeL protein-bound (ABRAXANE) 125 mg/m2 = 240 mg in 48 mL infusion  -     gemcitabine (GEMZAR) 1,950 mg in 0.9% NaCl 269.5 mL chemo infusion  -     prochlorperazine injection 5 mg  -     EPINEPHrine (EPIPEN) 0.3 mg/0.3 mL pen injection 0.3 mg  -     diphenhydrAMINE injection 50 mg  -     hydrocortisone sodium succinate injection 100 mg  -     0.9% NaCl 250 mL flush bag  -     sodium chloride 0.9% flush 10 mL  -     heparin,  porcine (PF) 100 unit/mL injection flush 500 Units  -     alteplase injection 2 mg        Route Chart for Scheduling    Med Onc Chart Routing      Follow up with physician . In 4 weeks scheeule CBC, CMP and see ROMANA and for Gemzar,abraxane and neulasta OBI. Labs and visit on a Tuesday and chemo on Wednesday   Follow up with ROMANA . In 2 weeks scheeule CBC, C MP and see ROMANA and for Gemzar,. abraxane and neulasta OBI. Labs and visit on a Tuesday and chemo on Wednesday   Infusion scheduling note    Injection scheduling note    Labs    Imaging    Pharmacy appointment    Other referrals                  Treatment Plan Information   OP GI modified nab-PACLitaxel + gemcitabine Q4W Lacy Dunne MD   Associated diagnosis: Malignant neoplasm of body of pancreas Stage IV cT1c, cN0, cM1 noted on 12/26/2024   Line of treatment: First Line  Treatment Goal: Palliative     Upcoming Treatment Dates - OP GI modified nab-PACLitaxel + gemcitabine Q4W    1/13/2025       Chemotherapy       PACLitaxeL protein-bound (ABRAXANE) 125 mg/m2 = 240 mg in 48 mL infusion       gemcitabine (GEMZAR) 1,950 mg in 0.9% NaCl 269.5 mL chemo infusion       Antiemetics       ondansetron injection 8 mg  1/27/2025       Chemotherapy       PACLitaxeL protein-bound (ABRAXANE) 125 mg/m2 = 240 mg in 48 mL infusion       gemcitabine (GEMZAR) 1,950 mg in 0.9% NaCl 269.5 mL chemo infusion       Antiemetics       ondansetron injection 8 mg  2/10/2025       Chemotherapy       PACLitaxeL protein-bound (ABRAXANE) 125 mg/m2 = 240 mg in 48 mL infusion       gemcitabine (GEMZAR) 1,950 mg in 0.9% NaCl 269.5 mL chemo infusion       Antiemetics       ondansetron injection 8 mg  2/24/2025       Chemotherapy       PACLitaxeL protein-bound (ABRAXANE) 125 mg/m2 = 240 mg in 48 mL infusion       gemcitabine (GEMZAR) 1,950 mg in 0.9% NaCl 269.5 mL chemo infusion       Antiemetics       ondansetron injection 8 mg    Therapy Plan Information  ENTYVIO GI for Ulcerative colitis, noted  on 10/20/2022  Medications  vedolizumab (ENTYVIO) 300 mg/250 mL NS IVPB  300 mg, Intravenous, Every 4 weeks  PRN Medications  diphenhydrAMINE injection 25 mg  25 mg, Intravenous, PRN  acetaminophen tablet 650 mg  650 mg, Oral, PRN  albuterol-ipratropium 2.5 mg-0.5 mg/3 mL nebulizer solution 3 mL  3 mL, Nebulization, PRN  methylPREDNISolone sodium succinate injection 40 mg  40 mg, Intravenous, PRN  EPINEPHrine (PF) injection 0.3 mg  0.3 mg, Subcutaneous, PRN  Flushes  0.9% NaCl 250 mL flush bag  Intravenous, Every visit  sodium chloride 0.9% flush 10 mL  10 mL, Intravenous, Every visit  heparin, porcine (PF) 100 unit/mL injection flush 500 Units  500 Units, Intravenous, Every visit  alteplase injection 2 mg  2 mg, Intra-Catheter, Every visit      No therapy plan of the specified type found.    No therapy plan of the specified type found.       Bonifacio Olson is doing very well clinically and proceed with palliative chemotherapy with Gemzar and Abraxane.  He will return in 2 weeks for next cycle    Ulcerative colitis stable he will continue with Entyvio    Visit today included increased complexity associated with the care of the episodic problem chemotherapy addressed and managing the longitudinal care of the patient due to the serious and/or complex managed problem(s) pancreatic cancer    Above care plan was discussed with patient and accompanying son  and all questions were addressed to their satisfaction

## 2025-01-15 ENCOUNTER — DOCUMENTATION ONLY (OUTPATIENT)
Dept: INFUSION THERAPY | Facility: HOSPITAL | Age: 85
End: 2025-01-15
Payer: OTHER GOVERNMENT

## 2025-01-15 ENCOUNTER — DOCUMENTATION ONLY (OUTPATIENT)
Dept: HEMATOLOGY/ONCOLOGY | Facility: CLINIC | Age: 85
End: 2025-01-15
Payer: OTHER GOVERNMENT

## 2025-01-15 ENCOUNTER — INFUSION (OUTPATIENT)
Dept: INFUSION THERAPY | Facility: HOSPITAL | Age: 85
End: 2025-01-15
Attending: INTERNAL MEDICINE
Payer: MEDICARE

## 2025-01-15 VITALS
HEART RATE: 76 BPM | RESPIRATION RATE: 16 BRPM | BODY MASS INDEX: 26.6 KG/M2 | DIASTOLIC BLOOD PRESSURE: 62 MMHG | SYSTOLIC BLOOD PRESSURE: 124 MMHG | TEMPERATURE: 98 F | HEIGHT: 68 IN | OXYGEN SATURATION: 97 % | WEIGHT: 175.5 LBS

## 2025-01-15 DIAGNOSIS — C25.1 MALIGNANT NEOPLASM OF BODY OF PANCREAS: Primary | ICD-10-CM

## 2025-01-15 DIAGNOSIS — K51.018 ULCERATIVE PANCOLITIS WITH OTHER COMPLICATION: ICD-10-CM

## 2025-01-15 PROCEDURE — 63600175 PHARM REV CODE 636 W HCPCS: Mod: JZ,TB,PN | Performed by: INTERNAL MEDICINE

## 2025-01-15 PROCEDURE — 96413 CHEMO IV INFUSION 1 HR: CPT | Mod: PN

## 2025-01-15 PROCEDURE — 96367 TX/PROPH/DG ADDL SEQ IV INF: CPT | Mod: PN

## 2025-01-15 PROCEDURE — A4216 STERILE WATER/SALINE, 10 ML: HCPCS | Mod: PN | Performed by: INTERNAL MEDICINE

## 2025-01-15 PROCEDURE — 96375 TX/PRO/DX INJ NEW DRUG ADDON: CPT | Mod: PN

## 2025-01-15 PROCEDURE — 96417 CHEMO IV INFUS EACH ADDL SEQ: CPT | Mod: PN

## 2025-01-15 PROCEDURE — 25000003 PHARM REV CODE 250: Mod: PN | Performed by: INTERNAL MEDICINE

## 2025-01-15 RX ORDER — SODIUM CHLORIDE 0.9 % (FLUSH) 0.9 %
10 SYRINGE (ML) INJECTION
Status: DISCONTINUED | OUTPATIENT
Start: 2025-01-15 | End: 2025-01-15 | Stop reason: HOSPADM

## 2025-01-15 RX ORDER — HEPARIN 100 UNIT/ML
500 SYRINGE INTRAVENOUS
Status: DISCONTINUED | OUTPATIENT
Start: 2025-01-15 | End: 2025-01-15 | Stop reason: HOSPADM

## 2025-01-15 RX ORDER — IPRATROPIUM BROMIDE AND ALBUTEROL SULFATE 2.5; .5 MG/3ML; MG/3ML
3 SOLUTION RESPIRATORY (INHALATION)
OUTPATIENT
Start: 2025-02-12

## 2025-01-15 RX ORDER — SODIUM CHLORIDE 0.9 % (FLUSH) 0.9 %
10 SYRINGE (ML) INJECTION
OUTPATIENT
Start: 2025-02-12

## 2025-01-15 RX ORDER — PROCHLORPERAZINE EDISYLATE 5 MG/ML
5 INJECTION INTRAMUSCULAR; INTRAVENOUS ONCE AS NEEDED
Status: DISCONTINUED | OUTPATIENT
Start: 2025-01-15 | End: 2025-01-15 | Stop reason: HOSPADM

## 2025-01-15 RX ORDER — DIPHENHYDRAMINE HYDROCHLORIDE 50 MG/ML
25 INJECTION INTRAMUSCULAR; INTRAVENOUS
OUTPATIENT
Start: 2025-02-12

## 2025-01-15 RX ORDER — METHYLPREDNISOLONE SOD SUCC 125 MG
40 VIAL (EA) INJECTION
OUTPATIENT
Start: 2025-02-12

## 2025-01-15 RX ORDER — ACETAMINOPHEN 325 MG/1
650 TABLET ORAL
OUTPATIENT
Start: 2025-02-12

## 2025-01-15 RX ORDER — EPINEPHRINE 1 MG/ML
0.3 INJECTION, SOLUTION, CONCENTRATE INTRAVENOUS
OUTPATIENT
Start: 2025-02-12

## 2025-01-15 RX ORDER — HEPARIN 100 UNIT/ML
500 SYRINGE INTRAVENOUS
OUTPATIENT
Start: 2025-02-12

## 2025-01-15 RX ORDER — EPINEPHRINE 0.3 MG/.3ML
0.3 INJECTION SUBCUTANEOUS ONCE AS NEEDED
Status: DISCONTINUED | OUTPATIENT
Start: 2025-01-15 | End: 2025-01-15 | Stop reason: HOSPADM

## 2025-01-15 RX ORDER — DIPHENHYDRAMINE HYDROCHLORIDE 50 MG/ML
50 INJECTION INTRAMUSCULAR; INTRAVENOUS ONCE AS NEEDED
Status: DISCONTINUED | OUTPATIENT
Start: 2025-01-15 | End: 2025-01-15 | Stop reason: HOSPADM

## 2025-01-15 RX ORDER — ONDANSETRON HYDROCHLORIDE 2 MG/ML
8 INJECTION, SOLUTION INTRAVENOUS
Status: COMPLETED | OUTPATIENT
Start: 2025-01-15 | End: 2025-01-15

## 2025-01-15 RX ADMIN — PACLITAXEL 240 MG: 100 INJECTION, POWDER, LYOPHILIZED, FOR SUSPENSION INTRAVENOUS at 01:01

## 2025-01-15 RX ADMIN — Medication 10 ML: at 03:01

## 2025-01-15 RX ADMIN — ONDANSETRON 8 MG: 2 INJECTION INTRAMUSCULAR; INTRAVENOUS at 01:01

## 2025-01-15 RX ADMIN — VEDOLIZUMAB 300 MG: 300 INJECTION, POWDER, LYOPHILIZED, FOR SOLUTION INTRAVENOUS at 03:01

## 2025-01-15 RX ADMIN — GEMCITABINE 2000 MG: 38 INJECTION, SOLUTION INTRAVENOUS at 02:01

## 2025-01-15 RX ADMIN — SODIUM CHLORIDE: 9 INJECTION, SOLUTION INTRAVENOUS at 01:01

## 2025-01-15 RX ADMIN — PEGFILGRASTIM 6 MG: KIT SUBCUTANEOUS at 03:01

## 2025-01-15 NOTE — PROGRESS NOTES
LCSW met with patient at the chairside during infusion. The patient was off to a good start today. He had his son with him today for support. Patient denied any emotional or practical needs at this time.  encouraged the patient to reach out if any needs arise.

## 2025-01-15 NOTE — NURSING
Chart review. Pt seen virtually by Dr. Dunne yesterday and cleared for treatment. Patient arrived for treatment today and is currently in infusion. Will continue to follow.

## 2025-01-15 NOTE — PLAN OF CARE
Problem: Adult Inpatient Plan of Care  Goal: Plan of Care Review  Outcome: Progressing  Flowsheets (Taken 1/15/2025 1546)  Plan of Care Reviewed With:   patient   child  Goal: Patient-Specific Goal (Individualized)  Outcome: Progressing  Flowsheets (Taken 1/15/2025 1546)  Individualized Care Needs: recliner, warm blanket, dim lights, ice water  Anxieties, Fears or Concerns: none     Problem: Fatigue  Goal: Improved Activity Tolerance  Outcome: Progressing  Intervention: Promote Improved Energy  Flowsheets (Taken 1/15/2025 1546)  Fatigue Management: frequent rest breaks encouraged  Sleep/Rest Enhancement: awakenings minimized  Activity Management:   Ambulated -L4   Ambulated to bathroom - L4   Ambulated in chandler - L4  Environmental Support: calm environment promoted   Pt tolerated Abraxane, Gemzar, and Entyvio infusion well.   No adverse reaction noted.  PT watched OBI video and placed to abdomen.  PAC flushed with NS and de-accessed per protocol.   Pt left clinic in no acute distress.

## 2025-01-17 ENCOUNTER — NURSE TRIAGE (OUTPATIENT)
Dept: ADMINISTRATIVE | Facility: CLINIC | Age: 85
End: 2025-01-17
Payer: OTHER GOVERNMENT

## 2025-01-18 PROBLEM — K80.20 GALLSTONES: Status: ACTIVE | Noted: 2025-01-18

## 2025-01-18 PROBLEM — R50.9 FEVER: Status: ACTIVE | Noted: 2025-01-18

## 2025-01-18 PROBLEM — Z71.89 ACP (ADVANCE CARE PLANNING): Status: ACTIVE | Noted: 2025-01-18

## 2025-01-18 NOTE — TELEPHONE ENCOUNTER
Pt w/ recent chemo reports temperature of 100.6. Reports feeling like he may have a bug as his throat is also sore. Advised, per protocol and verbalizes understanding. Unsure if he will follow disposition. I have informed him I would route a message to provider for follow up in this regard.    Reason for Disposition   [1] Neutropenia known or suspected (e.g., recent cancer chemotherapy, stem cell transplant) AND [2] fever > 100.4 F (38.0 C)    Additional Information   Negative: Shock suspected (e.g., cold/pale/clammy skin, too weak to stand, low BP, rapid pulse)   Negative: Difficult to awaken or acting confused (e.g., disoriented, slurred speech)   Negative: Bluish (or gray) lips or face now   Negative: New-onset rash with many purple (or blood-colored) spots or dots   Negative: Sounds like a life-threatening emergency to the triager    Protocols used: Cancer - Fever-A-

## 2025-01-19 PROBLEM — A41.9 SEPSIS WITHOUT ACUTE ORGAN DYSFUNCTION: Status: ACTIVE | Noted: 2025-01-19

## 2025-01-21 PROBLEM — A41.9 SEPSIS WITHOUT ACUTE ORGAN DYSFUNCTION: Status: RESOLVED | Noted: 2025-01-19 | Resolved: 2025-01-21

## 2025-01-24 ENCOUNTER — TELEPHONE (OUTPATIENT)
Dept: HEMATOLOGY/ONCOLOGY | Facility: CLINIC | Age: 85
End: 2025-01-24
Payer: OTHER GOVERNMENT

## 2025-01-24 NOTE — TELEPHONE ENCOUNTER
Impression    Alfie Olson's panel genetic testing was negative for actionable mutations in 39 genes associated with hereditary breast, gastrointestinal, gynecologic, pancreatic, prostate and skin cancers. Results were disclosed over the phone on 1/24/2025.    Discussion    Genetic Test Results    Alfie Olson had a sample submitted on 1/7/2025 to [facility] for CancerNext DNA+RNA panel testing. This panel includes sequencing and/or deletion/duplication analysis of the following 39 genes:    APC, ANASTASIYA, AXIN2, BAP1, BARD1, BMPR1A, BRCA1, BRCA2, BRIP1, CDH1, CDK4, CDKN2A, CHEK2, EPCAM, FH, FLCN, GREM1, HOXB13, MBD4, MET, MLH1, MSH2, MSH3, MSH6, MUTYH, NF1, NTHL1, PALB2, PMS2, POLD1, POLE, PTEN, RAD51C, RAD51D, SMAD4, STK11, TP53, TSC1, TSC2, VHL     The results were negative for actionable mutations in any of these genes. This is considered a negative result, but it does not completely rule out a hereditary form of cancer in her family. Some individuals/families with cancer may have a mutation in a gene that is not included in this panel, and some may have mutations in one of these genes that is not detectable with our current technology. It could also be that Mr. Olson's personal and family history of cancer is not due to a hereditary cause.     Given his negative results, the likelihood of a hereditary form of cancer has been drastically reduced for Mr. Olson and his family. He has undergone comprehensive hereditary cancer genetic testing, and no further genetic testing is recommended at this time.      Alfie Olson received comprehensive genetic counseling regarding his negative genetic test results. Benefits and limitations were discussed, and he was provided with an electronic copy of her results report. He is encouraged to contact cancer genetics if there are any updates to his personal or family history, or if he has any questions or concerns.    This assessment is based on the history and reports  provided, as well as the current scientific knowledge regarding cancer genetics.

## 2025-01-28 ENCOUNTER — LAB VISIT (OUTPATIENT)
Dept: LAB | Facility: HOSPITAL | Age: 85
End: 2025-01-28
Payer: MEDICARE

## 2025-01-28 ENCOUNTER — OFFICE VISIT (OUTPATIENT)
Dept: HEMATOLOGY/ONCOLOGY | Facility: CLINIC | Age: 85
End: 2025-01-28
Payer: MEDICARE

## 2025-01-28 VITALS
RESPIRATION RATE: 19 BRPM | HEART RATE: 92 BPM | DIASTOLIC BLOOD PRESSURE: 66 MMHG | HEIGHT: 68 IN | SYSTOLIC BLOOD PRESSURE: 125 MMHG | OXYGEN SATURATION: 96 % | BODY MASS INDEX: 26.76 KG/M2 | TEMPERATURE: 99 F | WEIGHT: 176.56 LBS

## 2025-01-28 DIAGNOSIS — C25.1 MALIGNANT NEOPLASM OF BODY OF PANCREAS: ICD-10-CM

## 2025-01-28 DIAGNOSIS — Z79.899 IMMUNODEFICIENCY DUE TO CHEMOTHERAPY: ICD-10-CM

## 2025-01-28 DIAGNOSIS — T45.1X5A IMMUNODEFICIENCY DUE TO CHEMOTHERAPY: ICD-10-CM

## 2025-01-28 DIAGNOSIS — D84.821 IMMUNODEFICIENCY DUE TO CHEMOTHERAPY: ICD-10-CM

## 2025-01-28 DIAGNOSIS — C78.02 MALIGNANT NEOPLASM METASTATIC TO BOTH LUNGS: ICD-10-CM

## 2025-01-28 DIAGNOSIS — R30.0 DYSURIA: Primary | ICD-10-CM

## 2025-01-28 DIAGNOSIS — C78.01 MALIGNANT NEOPLASM METASTATIC TO BOTH LUNGS: ICD-10-CM

## 2025-01-28 DIAGNOSIS — C79.9 ADENOCARCINOMA, METASTATIC: ICD-10-CM

## 2025-01-28 LAB
ALBUMIN SERPL BCP-MCNC: 3.1 G/DL (ref 3.5–5.2)
ALP SERPL-CCNC: 101 U/L (ref 40–150)
ALT SERPL W/O P-5'-P-CCNC: 39 U/L (ref 10–44)
ANION GAP SERPL CALC-SCNC: 10 MMOL/L (ref 8–16)
AST SERPL-CCNC: 39 U/L (ref 10–40)
BASOPHILS # BLD AUTO: 0.02 K/UL (ref 0–0.2)
BASOPHILS NFR BLD: 0.1 % (ref 0–1.9)
BILIRUB SERPL-MCNC: 0.4 MG/DL (ref 0.1–1)
BUN SERPL-MCNC: 12 MG/DL (ref 8–23)
CALCIUM SERPL-MCNC: 8.9 MG/DL (ref 8.7–10.5)
CHLORIDE SERPL-SCNC: 101 MMOL/L (ref 95–110)
CO2 SERPL-SCNC: 27 MMOL/L (ref 23–29)
CREAT SERPL-MCNC: 0.9 MG/DL (ref 0.5–1.4)
DIFFERENTIAL METHOD BLD: ABNORMAL
EOSINOPHIL # BLD AUTO: 0.1 K/UL (ref 0–0.5)
EOSINOPHIL NFR BLD: 0.7 % (ref 0–8)
ERYTHROCYTE [DISTWIDTH] IN BLOOD BY AUTOMATED COUNT: 14.4 % (ref 11.5–14.5)
EST. GFR  (NO RACE VARIABLE): >60 ML/MIN/1.73 M^2
GLUCOSE SERPL-MCNC: 121 MG/DL (ref 70–110)
HCT VFR BLD AUTO: 32.4 % (ref 40–54)
HGB BLD-MCNC: 10.6 G/DL (ref 14–18)
IMM GRANULOCYTES # BLD AUTO: 0.13 K/UL (ref 0–0.04)
IMM GRANULOCYTES NFR BLD AUTO: 1 % (ref 0–0.5)
LYMPHOCYTES # BLD AUTO: 2.9 K/UL (ref 1–4.8)
LYMPHOCYTES NFR BLD: 21.6 % (ref 18–48)
MCH RBC QN AUTO: 29.5 PG (ref 27–31)
MCHC RBC AUTO-ENTMCNC: 32.7 G/DL (ref 32–36)
MCV RBC AUTO: 90 FL (ref 82–98)
MONOCYTES # BLD AUTO: 1.1 K/UL (ref 0.3–1)
MONOCYTES NFR BLD: 7.9 % (ref 4–15)
NEUTROPHILS # BLD AUTO: 9.2 K/UL (ref 1.8–7.7)
NEUTROPHILS NFR BLD: 68.7 % (ref 38–73)
NRBC BLD-RTO: 0 /100 WBC
PLATELET # BLD AUTO: 367 K/UL (ref 150–450)
PLATELET BLD QL SMEAR: ABNORMAL
PMV BLD AUTO: 9.7 FL (ref 9.2–12.9)
POTASSIUM SERPL-SCNC: 4.7 MMOL/L (ref 3.5–5.1)
PROT SERPL-MCNC: 6.9 G/DL (ref 6–8.4)
RBC # BLD AUTO: 3.59 M/UL (ref 4.6–6.2)
SODIUM SERPL-SCNC: 138 MMOL/L (ref 136–145)
WBC # BLD AUTO: 13.46 K/UL (ref 3.9–12.7)

## 2025-01-28 PROCEDURE — G2211 COMPLEX E/M VISIT ADD ON: HCPCS | Mod: S$PBB,,,

## 2025-01-28 PROCEDURE — 99214 OFFICE O/P EST MOD 30 MIN: CPT | Mod: PBBFAC,PN

## 2025-01-28 PROCEDURE — 99215 OFFICE O/P EST HI 40 MIN: CPT | Mod: S$PBB,,,

## 2025-01-28 PROCEDURE — 85025 COMPLETE CBC W/AUTO DIFF WBC: CPT | Mod: PN | Performed by: INTERNAL MEDICINE

## 2025-01-28 PROCEDURE — 99999 PR PBB SHADOW E&M-EST. PATIENT-LVL IV: CPT | Mod: PBBFAC,,,

## 2025-01-28 PROCEDURE — 80053 COMPREHEN METABOLIC PANEL: CPT | Mod: PN | Performed by: INTERNAL MEDICINE

## 2025-01-28 PROCEDURE — 36415 COLL VENOUS BLD VENIPUNCTURE: CPT | Mod: PN | Performed by: INTERNAL MEDICINE

## 2025-01-28 RX ORDER — PROCHLORPERAZINE EDISYLATE 5 MG/ML
5 INJECTION INTRAMUSCULAR; INTRAVENOUS ONCE AS NEEDED
Status: CANCELLED | OUTPATIENT
Start: 2025-01-29

## 2025-01-28 RX ORDER — SODIUM CHLORIDE 0.9 % (FLUSH) 0.9 %
10 SYRINGE (ML) INJECTION
Status: CANCELLED | OUTPATIENT
Start: 2025-01-29

## 2025-01-28 RX ORDER — ONDANSETRON HYDROCHLORIDE 2 MG/ML
8 INJECTION, SOLUTION INTRAVENOUS
Status: CANCELLED | OUTPATIENT
Start: 2025-01-29

## 2025-01-28 RX ORDER — HEPARIN 100 UNIT/ML
500 SYRINGE INTRAVENOUS
Status: CANCELLED | OUTPATIENT
Start: 2025-01-29

## 2025-01-28 RX ORDER — EPINEPHRINE 0.3 MG/.3ML
0.3 INJECTION SUBCUTANEOUS ONCE AS NEEDED
Status: CANCELLED | OUTPATIENT
Start: 2025-01-29

## 2025-01-28 RX ORDER — DIPHENHYDRAMINE HYDROCHLORIDE 50 MG/ML
50 INJECTION INTRAMUSCULAR; INTRAVENOUS ONCE AS NEEDED
Status: CANCELLED | OUTPATIENT
Start: 2025-01-29

## 2025-01-28 NOTE — PROGRESS NOTES
PATIENT: Alfie Olson  MRN: [id number]  DATE: 1/28/2025      Diagnosis:   1. Dysuria    2. Adenocarcinoma, metastatic    3. Malignant neoplasm of body of pancreas    4. Malignant neoplasm metastatic to both lungs    5. Immunodeficiency due to chemotherapy        Chief Complaint: Follow-up (FU and Labs/), Dysuria (Dysuria started after discharge from hospital), and Edema (X 4 days left foot)          Subjective:    Interval History: Mr. Olson is a 84 y.o. male who returns for follow up. He reports feeling improved since discharge from [facility] on 1/22/25, after being admitted for fever. Inpatient workup failed to reveal any sources of infection, although Cholelithiasis with biliary sludge without sonographic evidence for cholecystitis was noted on scan. He was treated with IV antibiotics. He has been afebrile since, but reports some burning and hesitancy with urination over the past several days. He also reports some slight swelling to L ankle and foot which is relieved with elevation. He denies any pain to lower extremity, although he does have a callous at the base of his L second toe which is tender. This is followed by podiatry.     Denies fever, chills, sob, cp, palpitations, numbness, tingling, n/v, diarrhea, constipation, abdominal pain, new bumps, lumps, bleeding, bruising.     Oncologic History:   Oncology History   Malignant neoplasm of body of pancreas   12/26/2024 Initial Diagnosis    Adenocarcinoma, metastatic     12/26/2024 Cancer Staged    Staging form: Exocrine Pancreas, AJCC 8th Edition  - Clinical: Stage IV (cT1c, cN0, cM1)     1/15/2025 -  Chemotherapy    Treatment Summary   Plan Name: OP GI modified nab-PACLitaxel + gemcitabine Q4W  Treatment Goal: Palliative  Status: Active  Start Date: 1/15/2025  End Date: 6/18/2025 (Planned)  Provider: Lacy Dunne MD  Chemotherapy: gemcitabine 2,000 mg in 0.9% NaCl 285 mL chemo infusion, 1,000 mg/m2 = 1,950 mg, Intravenous, Clinic/HOD 1 time, 1 of 6  cycles  Administration: 2,000 mg (1/15/2025)         Past Medical History:   Past Medical History:   Diagnosis Date    Anticoagulant long-term use     Arthritis     Asthma     B12 deficiency     Cholelithiasis     Chronic back pain     Chronic diarrhea     Colon polyp     COPD (chronic obstructive pulmonary disease)     Diverticulosis     Full dentures     GERD (gastroesophageal reflux disease)     Glaucoma     left eye    History of fall     Hyperlipemia     JUAN DIEGO (obstructive sleep apnea)     denies CPAP use    Skin cancer     Basal cell    Ulcerative colitis 1961    Vitamin D deficiency     Wrist fracture 11/2024       Past Surgical HIstory:   Past Surgical History:   Procedure Laterality Date    BACK SURGERY      CATARACT EXTRACTION W/ INTRAOCULAR LENS IMPLANT Right     COLONOSCOPY  2014    COLONOSCOPY  03/25/2015    diverticulosis, otherwise normal findings, biopsies taken from 4 quadrants- see media section for biopsy report, repeat in 1 year    COLONOSCOPY N/A 06/01/2016    Procedure: COLONOSCOPY;  Surgeon: Ravindra Ervin MD;  Location: [facility];  Service: Endoscopy;  Laterality: N/A;    COLONOSCOPY N/A 09/01/2022    Procedure: COLONOSCOPY;  Surgeon: Lencho Ivory MD;  Location: [facility];  Service: Endoscopy;  Laterality: N/A;    COLONOSCOPY N/A 08/18/2023    Procedure: COLONOSCOPY;  Surgeon: Lencho Ivory MD;  Location: [facility];  Service: Endoscopy;  Laterality: N/A;    COLONOSCOPY N/A 07/25/2024    Procedure: COLONOSCOPY;  Surgeon: Ravindra Ervin MD;  Location: [facility];  Service: Gastroenterology;  Laterality: N/A;    CORONARY STENT PLACEMENT  2016    HERNIA REPAIR      rt inguinal    INSERTION OF TUNNELED CENTRAL VENOUS CATHETER (CVC) WITH SUBCUTANEOUS PORT Right 1/3/2025    Procedure: [id number];  Surgeon: Adiel Alejo MD;  Location: [facility] OR;  Service: General;  Laterality: Right;    LUMBAR FUSION      L3,4,5    MULTIPLE TOOTH EXTRACTIONS      pain pump placement   04/08/2010    dilaudid/baclofen    ROBOTIC BRONCHOSCOPY Bilateral 12/12/2024    Procedure: ROBOTIC BRONCHOSCOPY;  Surgeon: Gregg White MD;  Location: [facility] OR;  Service: Pulmonary;  Laterality: Bilateral;    SKIN BIOPSY      SKIN CANCER EXCISION      UPPER GASTROINTESTINAL ENDOSCOPY  10/26/2009    hiatal hernia       Family History:   Family History   Problem Relation Name Age of Onset    Liver cancer Sister  83    Pancreatic cancer Brother  54    Stroke Mother          X2    Heart attack Father          X3    Alzheimer's disease Maternal Grandfather      Ulcerative colitis Son  20 - 29    Multiple sclerosis Brother  37    Heart attack Brother  54    Colon cancer Neg Hx      Colon polyps Neg Hx         Social History:  reports that he quit smoking about 42 years ago. His smoking use included cigarettes. He has never used smokeless tobacco. He reports that he does not drink alcohol and does not use drugs.    Allergies:  Review of patient's allergies indicates:  No Known Allergies    Medications:  Current Outpatient Medications   Medication Sig Dispense Refill    acetaminophen (TYLENOL) 500 MG tablet Take 1 tablet by mouth 4 (four) times daily as needed.      albuterol (PROVENTIL) 5 mg/mL nebulizer solution Take 2.5 mg by nebulization every 6 (six) hours as needed for Wheezing.      ammonium lactate (LAC-HYDRIN) 12 % lotion Apply topically.      clopidogreL (PLAVIX) 75 mg tablet 75 mg.      fluticasone-salmeterol diskus inhaler 250-50 mcg Inhale 1 puff into the lungs Daily.      latanoprost 0.005 % ophthalmic solution Place 1 drop into both eyes every evening.      LIDOcaine-prilocaine (EMLA) cream Apply topically as needed (apply to port 30 min prior to chemo). 30 g 0    lipase-protease-amylase (CREON) 36,000-114,000- 180,000 unit CpDR Take 1 capsule by mouth 3 (three) times daily with meals. 90 capsule 2    montelukast (SINGULAIR) 10 mg tablet Take 10 mg by mouth.      prochlorperazine (COMPAZINE) 5 MG tablet  "Take 1 tablet (5 mg total) by mouth every 6 (six) hours as needed. 20 tablet 3    rosuvastatin (CRESTOR) 5 MG tablet Take 5 mg by mouth once daily.      tamsulosin (FLOMAX) 0.4 mg Cap Take 0.4 mg by mouth once daily.       No current facility-administered medications for this visit.       Review of Systems   Constitutional:  Negative for chills and fever.   Respiratory:  Negative for shortness of breath.    Cardiovascular:  Positive for leg swelling. Negative for chest pain and palpitations.   Gastrointestinal:  Negative for abdominal pain, constipation, diarrhea and nausea.   Genitourinary:  Positive for dysuria.   Musculoskeletal: Negative.    Skin: Negative.    Neurological: Negative.    Hematological: Negative.    Psychiatric/Behavioral: Negative.         ECOG Performance Status:   ECOG SCORE             Objective:      Vitals:   Vitals:    01/28/25 1042   BP: 125/66   BP Location: Left arm   Patient Position: Sitting   Pulse: 92   Resp: 19   Temp: 98.9 °F (37.2 °C)   TempSrc: Temporal   SpO2: 96%   Weight: 80.1 kg (176 lb 9.4 oz)   Height: 5' 8" (1.727 m)     BMI: Body mass index is 26.85 kg/m².    Physical Exam  HENT:      Head: Normocephalic.      Nose: Nose normal.      Mouth/Throat:      Mouth: Mucous membranes are moist.      Pharynx: Oropharynx is clear.   Eyes:      Pupils: Pupils are equal, round, and reactive to light.   Cardiovascular:      Rate and Rhythm: Normal rate and regular rhythm.      Heart sounds: Normal heart sounds.      Comments: Minimal edema to L ankle noted  Pulmonary:      Effort: Pulmonary effort is normal.      Breath sounds: Normal breath sounds.   Abdominal:      General: Bowel sounds are normal.   Musculoskeletal:         General: Normal range of motion.      Cervical back: Normal range of motion.      Left lower leg: Edema present.        Feet:    Feet:      Left foot:      Skin integrity: Callus present.   Skin:     General: Skin is warm and dry.   Neurological:      Mental " Status: He is alert and oriented to person, place, and time.   Psychiatric:         Mood and Affect: Mood normal.         Behavior: Behavior normal.         Laboratory Data:  Recent Results (from the past 24 hours)   CBC w/ DIFF    Collection Time: 01/28/25 10:26 AM   Result Value Ref Range    WBC 13.46 (H) 3.90 - 12.70 K/uL    RBC 3.59 (L) 4.60 - 6.20 M/uL    Hemoglobin 10.6 (L) 14.0 - 18.0 g/dL    Hematocrit 32.4 (L) 40.0 - 54.0 %    MCV 90 82 - 98 fL    MCH 29.5 27.0 - 31.0 pg    MCHC 32.7 32.0 - 36.0 g/dL    RDW 14.4 11.5 - 14.5 %    Platelets 367 150 - 450 K/uL    MPV 9.7 9.2 - 12.9 fL    Immature Granulocytes 1.0 (H) 0.0 - 0.5 %    Gran # (ANC) 9.2 (H) 1.8 - 7.7 K/uL    Immature Grans (Abs) 0.13 (H) 0.00 - 0.04 K/uL    Lymph # 2.9 1.0 - 4.8 K/uL    Mono # 1.1 (H) 0.3 - 1.0 K/uL    Eos # 0.1 0.0 - 0.5 K/uL    Baso # 0.02 0.00 - 0.20 K/uL    nRBC 0 0 /100 WBC    Gran % 68.7 38.0 - 73.0 %    Lymph % 21.6 18.0 - 48.0 %    Mono % 7.9 4.0 - 15.0 %    Eosinophil % 0.7 0.0 - 8.0 %    Basophil % 0.1 0.0 - 1.9 %    Platelet Estimate Appears normal     Differential Method Automated    CMP    Collection Time: 01/28/25 10:26 AM   Result Value Ref Range    Sodium 138 136 - 145 mmol/L    Potassium 4.7 3.5 - 5.1 mmol/L    Chloride 101 95 - 110 mmol/L    CO2 27 23 - 29 mmol/L    Glucose 121 (H) 70 - 110 mg/dL    BUN 12 8 - 23 mg/dL    Creatinine 0.9 0.5 - 1.4 mg/dL    Calcium 8.9 8.7 - 10.5 mg/dL    Total Protein 6.9 6.0 - 8.4 g/dL    Albumin 3.1 (L) 3.5 - 5.2 g/dL    Total Bilirubin 0.4 0.1 - 1.0 mg/dL    Alkaline Phosphatase 101 40 - 150 U/L    AST 39 10 - 40 U/L    ALT 39 10 - 44 U/L    eGFR >60.0 >60 mL/min/1.73 m^2    Anion Gap 10 8 - 16 mmol/L   Urinalysis, Reflex to Urine Culture Urine, Clean Catch    Collection Time: 01/28/25 11:34 AM    Specimen: Urine   Result Value Ref Range    Specimen UA Urine, Clean Catch     Color, UA Yellow Yellow, Straw, Mariana    Appearance, UA Clear Clear    pH, UA 7.0 5.0 - 8.0    Specific  Gravity, UA 1.010 1.005 - 1.030    Protein, UA Negative Negative    Glucose, UA Negative Negative    Ketones, UA Negative Negative    Bilirubin (UA) Negative Negative    Occult Blood UA Negative Negative    Nitrite, UA Negative Negative    Leukocytes, UA Negative Negative          Imaging: US ABDOMEN LIMITED     CLINICAL HISTORY:  Distended gallbladder r/o cholelithiasis.;     TECHNIQUE:  Multiple sonographic images the right upper quadrant obtained by department sonographer.     COMPARISON:  11/29/2024     FINDINGS:  Liver demonstrates increased echogenicity with a smooth capsule.  No evidence for focal hepatic lesion.  No intrahepatic dilatation.  The portal vein is patent with hepatopetal flow.  The gallbladder is present without evidence for pericholecystic fluid, gallbladder wall thickening, or sonographic Corrigan sign.  Cholelithiasis is identified with biliary sludge.  Common duct measures 7 mm.     Right kidney measures 11.3 cm in length.  No hydronephrosis or calculus.  Parenchyma demonstrates increased echogenicity without evidence for cortical thinning.     The visualized pancreas, abdominal aorta, IVC within normal limits.     Impression:     1. Findings consistent hepatic steatosis and medical renal disease.  2. Cholelithiasis with biliary sludge without sonographic evidence for cholecystitis.        Electronically signed by:Cecil Artis MD  Date:                                            01/19/2025  ________________________________________________________  CT ABDOMEN PELVIS WITH IV CONTRAST     CLINICAL HISTORY:  Abdominal abscess/infection suspected; pancreatic cancer     TECHNIQUE:  Axial imaging of the abdomen and pelvis was performed with axial, sagittal and coronal reconstructions.  IV contrast was administered for this study.  Dynamic and delayed images were obtained.  Automated exposure control was utilized to limit radiation exposure to the patient.     DLP for the study is 1641 mgycm.      COMPARISON:  CT of the abdomen pelvis 11/22/2024 and CT angiogram of the chest dated 12/29/24     FINDINGS:  Base of Chest:     Innumerable pulmonary lesions are unchanged.  Trace left pleural fluid is again noted.     Liver, pancreas, spleen, gallbladder:     There is no acute abnormality of the liver, pancreas or spleen.  The gallbladder has a transverse diameter of 17 mm.  There is cholelithiasis and faint coli cystic fat stranding.     Kidneys:     Benign renal cysts are noted.  There is no obstructing urinary tract calculus, hydronephrosis or hydroureter. The adrenal glands are not enlarged.     Bowel:     The lack of oral contrast limits evaluation of the bowel.  There is no acute abnormality of the stomach. There is no small bowel dilatation.  There is no acute abnormality of the colon.  There is a large amount of fluid within the right colon.  The rectum is within normal limits.  The appendix is not clearly visualized.     Other:     There is no free fluid, free air or pathologic lymph node enlargement. The IVC is within normal limits.  There is no aneurysmal dilatation of the abdominal aorta.  Atherosclerotic calcifications are present.     Pelvis:     The urinary bladder is within normal limits. There is no significant free fluid in the pelvis.  Radiotherapy implants are seen in the prostate.     Body wall:     There is no acute fracture or other acute body wall finding.    Prior spine surgery is noted.     Impression:     1. Gallbladder hydrops with faint pericholecystic fat stranding and cholelithiasis.  Please correlate with exam to exclude acute cholecystitis.  Sonography could be performed to further evaluate this finding if clinically indicated.  2. Pulmonary metastases are again noted.  3. Trace left pleural fluid.  4. There is a large amount of fluid within the right colon.  There is no associated obstruction.  5. The appendix is not clearly visualized.  If appendicitis cannot be excluded  clinically, a follow-up study with oral contrast could be performed.        Electronically signed by:Fredy Simmons MD  Date:                                            01/18/2025     Assessment:       1. Dysuria    2. Adenocarcinoma, metastatic    3. Malignant neoplasm of body of pancreas    4. Malignant neoplasm metastatic to both lungs    5. Immunodeficiency due to chemotherapy           Plan:     Dysuria  -     Urinalysis, Reflex to Urine Culture Urine, Clean Catch; Future; Expected date: 01/28/2025    Adenocarcinoma, metastatic    Malignant neoplasm of body of pancreas    Malignant neoplasm metastatic to both lungs    Immunodeficiency due to chemotherapy      Mr Olson presents with his son today for clearance for c1d15 abraxane/gemzar scheduled for 1/29/25. Given his reported urinary symptoms, a UA was obtained which did not show evidence of infection.   We will proceed with treatment    Follow up with Dr Dunne as scheduled on 2/11/25.       Med Onc Chart Routing      Follow up with physician . Follow up as scheduled 2/11/25 with Dr Dunne with labs. Needs to submit urine sample today.   Follow up with ROMANA    Infusion scheduling note   ok for treatment on 1/29/25   Injection scheduling note    Labs    Imaging    Pharmacy appointment    Other referrals                  Plan was discussed with the patient at length, and he verbalized understanding. Alfie was given an opportunity to ask questions that were answered to his satisfaction, and he was advised to call in the interval if any problems or questions arise.    Assessment/Plan reviewed and approved by Dr Dunne    47 minutes were spent in coordination of patient's care, record review and counseling.    KWASI Oliveira, FNP-C  Hematology & Oncology

## 2025-01-29 ENCOUNTER — INFUSION (OUTPATIENT)
Dept: INFUSION THERAPY | Facility: HOSPITAL | Age: 85
End: 2025-01-29
Attending: INTERNAL MEDICINE
Payer: MEDICARE

## 2025-01-29 ENCOUNTER — DOCUMENTATION ONLY (OUTPATIENT)
Dept: INFUSION THERAPY | Facility: HOSPITAL | Age: 85
End: 2025-01-29
Payer: OTHER GOVERNMENT

## 2025-01-29 VITALS
BODY MASS INDEX: 26.76 KG/M2 | DIASTOLIC BLOOD PRESSURE: 64 MMHG | WEIGHT: 176.56 LBS | TEMPERATURE: 98 F | OXYGEN SATURATION: 96 % | SYSTOLIC BLOOD PRESSURE: 107 MMHG | RESPIRATION RATE: 20 BRPM | HEIGHT: 68 IN | HEART RATE: 110 BPM

## 2025-01-29 DIAGNOSIS — C25.1 MALIGNANT NEOPLASM OF BODY OF PANCREAS: Primary | ICD-10-CM

## 2025-01-29 PROCEDURE — 96417 CHEMO IV INFUS EACH ADDL SEQ: CPT | Mod: PN

## 2025-01-29 PROCEDURE — 63600175 PHARM REV CODE 636 W HCPCS: Mod: PN

## 2025-01-29 PROCEDURE — A4216 STERILE WATER/SALINE, 10 ML: HCPCS | Mod: PN

## 2025-01-29 PROCEDURE — 96377 APPLICATON ON-BODY INJECTOR: CPT | Mod: PN

## 2025-01-29 PROCEDURE — 25000003 PHARM REV CODE 250: Mod: PN

## 2025-01-29 PROCEDURE — 96413 CHEMO IV INFUSION 1 HR: CPT | Mod: PN

## 2025-01-29 PROCEDURE — 96375 TX/PRO/DX INJ NEW DRUG ADDON: CPT | Mod: PN

## 2025-01-29 RX ORDER — SODIUM CHLORIDE 0.9 % (FLUSH) 0.9 %
10 SYRINGE (ML) INJECTION
Status: DISCONTINUED | OUTPATIENT
Start: 2025-01-29 | End: 2025-01-29 | Stop reason: HOSPADM

## 2025-01-29 RX ORDER — ONDANSETRON HYDROCHLORIDE 2 MG/ML
8 INJECTION, SOLUTION INTRAVENOUS
Status: COMPLETED | OUTPATIENT
Start: 2025-01-29 | End: 2025-01-29

## 2025-01-29 RX ADMIN — ONDANSETRON 8 MG: 2 INJECTION INTRAMUSCULAR; INTRAVENOUS at 01:01

## 2025-01-29 RX ADMIN — PEGFILGRASTIM 6 MG: KIT SUBCUTANEOUS at 03:01

## 2025-01-29 RX ADMIN — Medication 10 ML: at 03:01

## 2025-01-29 RX ADMIN — SODIUM CHLORIDE: 9 INJECTION, SOLUTION INTRAVENOUS at 01:01

## 2025-01-29 RX ADMIN — PACLITAXEL 240 MG: 100 INJECTION, POWDER, LYOPHILIZED, FOR SUSPENSION INTRAVENOUS at 02:01

## 2025-01-29 RX ADMIN — GEMCITABINE 2000 MG: 38 INJECTION, SOLUTION INTRAVENOUS at 03:01

## 2025-01-29 NOTE — PROGRESS NOTES
Dr. Dunne,     Mr. Olson appears eligible for Dr. Day's U01: Early Detection of Pancreatic Cancer: Prospective Study. The purpose of this study is to discover and develop a test to detect markers in the blood for early detection of pancreatic abnormalities. It is an observational trial that only entails one blood draw and one questionnaire for the patient.      If Mr. Olson is interested, please let me know. Brandy or I can come by to review consent with him after his appointment with you today.     Thank you,  Giulia Bernardo, CCRC

## 2025-01-29 NOTE — PLAN OF CARE
.Pt tolerated abraxane/gemzar infusion well.  No adverse reaction noted.   Port flushed with NS and de-accessed per protocol.OBI inplaced  Patient left clinic in no acute distress.

## 2025-01-29 NOTE — PROGRESS NOTES
ONCOLOGY NUTRITION   FOLLOW UP VISIT        Alfie Olson is a 84 y.o. male.  DATE: 01/29/2025        Oncology Diagnosis: Pancreatic cancer with mets to both lungs     REFERRAL FROM:   [] Integrative Oncology   [] Med/Heme Oncology  [] Radiation Oncology  [] Surgical Oncology   [] Infusion Nurse    [x] Routine Nutrition follow up    TREATMENT PLAN:   [] Full treatment plan pending  [x] Chemotherapy  [] Immunotherapy  [] Radiation  [] Concurrent  [] Surgery  [] Treatment complete/post-treatment    ANTHROPOMETRICS:  Wt Readings from Last 10 Encounters:   01/29/25 80.1 kg (176 lb 9.4 oz)   01/28/25 80.1 kg (176 lb 9.4 oz)   01/18/25 78.8 kg (173 lb 11.6 oz)   01/15/25 79.6 kg (175 lb 7.8 oz)   01/14/25 79.6 kg (175 lb 7.8 oz)   01/10/25 79.1 kg (174 lb 6.1 oz)   01/03/25 78.9 kg (174 lb)   01/02/25 79.8 kg (176 lb)   01/02/25 79.2 kg (174 lb 9.7 oz)   12/29/24 79.8 kg (176 lb)      Weight Changes: has been stable    PHYSICAL EXAM:  Muscle Wasting Observed:  [] No Deficit   [x] Mild Deficit   [] Moderate   [] Severe    INTAKE:  [x] PO Intake [] TF Intake  Current Diet: regular diet  Dietary Patterns:  Eating meals/snacks as tolerated  [] Oral nutritional supplements:     SYMPTOMS/COMPLAINTS:   [x] No nutritional concerns at current  [] Diarrhea                    [] Constipation           [] Nausea                 [] Vomiting                [] Indigestion                [] Reflux              [] Poor Appetite            [] Anorexia                 [] Early Satiety         [] Gas                       [] Bloating                     [] Dry Mouth    [] Mucositis                   [] Mouth Sores           [] Poor Dentition      [] Difficulty chewing  [] Difficulty Swallowing   [] Pain with swallowing [] Change in taste      [] Change in smell   [] Pain (general)       [] Fatigue                      [] Sleep issues    [] Weight loss  [] other, please specify-     Nutrition Re-Assessment Risk: low to moderate d/t  pancreatic cancer    [x] Labs reviewed   [x] Meds reviewed    Education Provided:   [x] No Education Needed at this time  [] Diarrhea                                              [] Constipation                          [] Nausea/Vomiting  [] Mucositis                [] Dry Mouth    [] Dealing with changes in Taste/Smell  [] Dealing with Poor Appetite   [] Soft/moist Diet      [] Weight Loss/Gain     [] Weight Maintenance                           [] Indigestion/GERD                 [] Gas/Bloating          [] Foods High/ Low in specific nutrients [] Increasing Calories/Protein   [] Milkshake/Smoothies Recipes   [] Nutrition Supplements                        [] Increasing Fluid Intakes         [] Foods that fight cancer    [] Evidence bases resources                 [] Fermented Foods/Probiotics  [] Mediterranean/Plant Based Diet     [] Other, specify                                   [x] Handouts provided: Lentil Recipe      [] Samples provided     RD NOTE:  RD met with pt at chairside during infusion tx. Pt present for C1D15 Abraxane/Gemzar/OBI. Pt reports good appetite and denies any current N/V/D/C. Pt recently hospitalized for fever. Fever caused by Neulasta. Pt was having diarrhea while inpatient. He was given imodium and this has helped. RD had told pt she would give him a lentil recipe. RD provided this recipe at chairside. Pt weight has been stable.       RD Goals:   [x] Weight stable                  [] Weight gain                      [] Weight Loss                               [] Continue adequate Kcal/protein   [] Increase Kcal/protein      [] Adjust Tube-feeding Rx   [] Tolerate Tube Feedings             [] Increase tube feedings to goal     [] Tolerate Supplements     [] Symptom Improvement   [] Understand nutrition Education  [x] Offer supportive visits   [] other, please specify    RECOMMENDATIONS:  Continue current calorie/protein intake to maintain body weight   Continue imodium as directed    Continue Creon as prescribed  Continue current fluid intake to maintain proper hydration     Follow up: PRN throughout tx    Abbie Herring RDN, LDN  01/29/2025  2:52 PM

## 2025-02-03 LAB
GENETIC COUNSELING?: YES
GENSO SPECIMEN TYPE: NORMAL
MISCELLANEOUS GENETIC TEST NAME: NORMAL
PARTENTAL OR SIBLING TESTING?: NO
REFERENCE LAB: NORMAL
TEST RESULT: NORMAL

## 2025-02-10 NOTE — PROGRESS NOTES
Subjective     Patient ID: Alfie Olson is a 84 y.o. male.    Chief Complaint: Pancreatic Cancer  Mr. Olson underwent a CT of the abdomen 11/22/2024 for ulcerative colitis follow-up, which incidentally revealed new findings in the lower portion of the lungs. 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 CT of the chest from 2019 shows pleural-based nodules with calcifications consistent with asbestos-related pleural plaques.     Mr. Olson has ulcerative colitis managed with Entyvio infusions at the clinic. He also reports prior diagnoses of COPD and asthma, controlled with an inhaler. His cardiac history includes one stent placed 2-3 years ago, for which he takes Plavix. [facility]     CT chest on 11/27/2024: Interval development of numerous spiculated pulmonary nodules and masses scattered throughout the bilateral lungs.  Findings are highly concerning for malignancy/metastatic disease, however differential also includes septic emboli.  Recommend correlation with tissue sampling.  2. Redemonstration of scattered bilateral calcified and noncalcified pleural plaques.  3. Mild bilateral peripheral predominant interstitial reticulation and ground-glass density suggestive for interstitial lung disease.  4. Trace left pleural fluid.  5. Several hypodense lesions in the pancreatic body/tail which could represent cysts or cystic neoplasm.  Recommend further evaluation with contrast enhanced MRI MRCP pancreatic mass protocol.     PET scan on 11/27/2024: . Innumerable pleural and parenchymal bilateral lung nodules with FDG uptake suggestive of metastatic disease or mesothelioma.  2. Nodule in the left axilla subcutaneous tissues with low level uptake is indeterminate.  Direct visualization warranted.  3. Partially calcified mildly enlarged mediastinal lymph nodes with low level  "uptake are favored to be inflammatory.     MRI/MRCP on 12/11/2024: Probable pancreatic pseudocyst or side duct ectasia in the pancreatic tail.  No suspicious pancreatic lesions.     He underwent biopsy on 12/12/2024. Pathology reveals LINGULA, BIOPSY:--POSITIVE FOR ADENOCARCINOMA.      2. LUNG, RIGHT MIDDLE LOBE NODULE, BIOPSY:--POSITIVE FOR ADENOCARCINOMA.       Comment: The morphologic and immunophenotypic features are not entirely specific    but are most suggestive of pancreaticobiliary primary. Immunohistochemistry was performed with appropriate controls on block 1A based on the histopathologic findings and the results are summarized as  follows:     CK19: Positive. : Positive      He went to the ED on 12/29 with hemoptysis. CTA chest on 12/29/24 revealed "A pulmonary embolus is not demonstrated. Multiple densities bilaterally with a differential of pneumonia and metastatic disease. Small left-sided pleural effusion"  He completed oral antibiotics and his hemoptysis resolved     He started palliative chemo with Gemzar and Abraxane on 01/14/2025    He was hospitalized 01/18/2025 for fever of 100.4, no etiology was found for the infection and he was discharged without any antibiotics  He received cycle 1 day 15 of chemotherapy on 01/29/2025      HPI He comes in today for cycle 2 day 1 of palliative chemotherapy with Gemzar and Abraxane    He notes he did well with cycle 1 day 1 but had nausea and diarrhea (3/day) for 4 days after cycle 1 day 15.  Antiemetics helped with nausea. Has been compliant with creon, Has taken imodium   He also has ulcerative colitis  He did have mouth sores which he attributes to his dentures being loose  He does have mucus production and he has to clear his nose and his right nostril was bleeding the week after chemo    Does have SOB which is at his baseline.   ECOG PS is zero. He is here with his son      Review of Systems   Constitutional:  Negative for appetite change, fatigue " and unexpected weight change.   HENT:  Negative for mouth sores.    Eyes:  Negative for visual disturbance.   Respiratory:  Negative for cough and shortness of breath.    Cardiovascular:  Negative for chest pain.   Gastrointestinal:  Negative for abdominal pain and diarrhea.   Genitourinary:  Negative for frequency.   Musculoskeletal:  Negative for back pain.   Integumentary:  Negative for rash.   Neurological:  Negative for headaches.   Hematological:  Negative for adenopathy.   Psychiatric/Behavioral:  The patient is not nervous/anxious.    All other systems reviewed and are negative.         Objective     Physical Exam     Labs:  WBC   Date Value Ref Range Status   02/11/2025 13.46 (H) 3.90 - 12.70 K/uL Final     Hemoglobin   Date Value Ref Range Status   02/11/2025 10.0 (L) 14.0 - 18.0 g/dL Final     Hematocrit   Date Value Ref Range Status   02/11/2025 31.2 (L) 40.0 - 54.0 % Final     Platelets   Date Value Ref Range Status   02/11/2025 277 150 - 450 K/uL Final     Gran # (ANC)   Date Value Ref Range Status   02/11/2025 9.2 (H) 1.8 - 7.7 K/uL Final     Gran %   Date Value Ref Range Status   02/11/2025 68.4 38.0 - 73.0 % Final       Chemistry        Component Value Date/Time     02/11/2025 0759    K 4.4 02/11/2025 0759     02/11/2025 0759    CO2 26 02/11/2025 0759    BUN 10 02/11/2025 0759    CREATININE 0.9 02/11/2025 0759     (H) 02/11/2025 0759        Component Value Date/Time    CALCIUM 8.5 (L) 02/11/2025 0759    ALKPHOS 102 02/11/2025 0759    AST 34 02/11/2025 0759    ALT 33 02/11/2025 0759    BILITOT 0.6 02/11/2025 0759          Assessment and Plan     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 Malignant neoplasm metastatic to both lungs    4. Other emphysema  -     albuterol (PROVENTIL) 2.5 mg /3 mL (0.083 %) nebulizer solution; Take 3 mLs (2.5 mg total) by nebulization every 6 (six) hours as needed for Wheezing. Rescue  Dispense: 100 mL;  Refill: 5    Other orders  -     pegfilgrastim (NEULASTA (ON BODY INJECTOR)) injection 6 mg  -     ondansetron injection 8 mg  -     PACLitaxeL protein-bound (ABRAXANE) 125 mg/m2 = 240 mg in 48 mL infusion  -     gemcitabine (GEMZAR) 1,950 mg in 0.9% NaCl 269.5 mL chemo infusion  -     prochlorperazine injection 5 mg  -     EPINEPHrine (EPIPEN) 0.3 mg/0.3 mL pen injection 0.3 mg  -     diphenhydrAMINE injection 50 mg  -     hydrocortisone sodium succinate injection 100 mg  -     0.9% NaCl 250 mL flush bag  -     sodium chloride 0.9% flush 10 mL  -     heparin, porcine (PF) 100 unit/mL injection flush 500 Units  -     alteplase injection 2 mg      Route Chart for Scheduling    Med Onc Chart Routing      Follow up with physician . As scheduled   Follow up with ROMANA    Infusion scheduling note    Injection scheduling note    Labs    Imaging    Pharmacy appointment    Other referrals              Treatment Plan Information   OP GI modified nab-PACLitaxel + gemcitabine Q4W Lacy Dunne MD   Associated diagnosis: Malignant neoplasm of body of pancreas Stage IV cT1c, cN0, cM1 noted on 12/26/2024   Line of treatment: First Line  Treatment Goal: Palliative     Upcoming Treatment Dates - OP GI modified nab-PACLitaxel + gemcitabine Q4W    2/12/2025       Chemotherapy       PACLitaxeL protein-bound (ABRAXANE) 125 mg/m2 = 240 mg in 48 mL infusion       gemcitabine (GEMZAR) 1,950 mg in 0.9% NaCl 269.5 mL chemo infusion       Antiemetics       ondansetron injection 8 mg  2/26/2025       Chemotherapy       PACLitaxeL protein-bound (ABRAXANE) 125 mg/m2 = 240 mg in 48 mL infusion       gemcitabine (GEMZAR) 1,950 mg in 0.9% NaCl 269.5 mL chemo infusion       Antiemetics       ondansetron injection 8 mg  3/12/2025       Chemotherapy       PACLitaxeL protein-bound (ABRAXANE) 125 mg/m2 = 240 mg in 48 mL infusion       gemcitabine (GEMZAR) 1,950 mg in 0.9% NaCl 269.5 mL chemo infusion       Antiemetics       ondansetron injection 8  mg  3/26/2025       Chemotherapy       PACLitaxeL protein-bound (ABRAXANE) 125 mg/m2 = 240 mg in 48 mL infusion       gemcitabine (GEMZAR) 1,950 mg in 0.9% NaCl 269.5 mL chemo infusion       Antiemetics       ondansetron injection 8 mg    Therapy Plan Information  ENTYVIO GI for Ulcerative colitis, noted on 10/20/2022  Medications  vedolizumab (ENTYVIO) 300 mg/250 mL NS IVPB  300 mg, Intravenous, Every 4 weeks  PRN Medications  diphenhydrAMINE injection 25 mg  25 mg, Intravenous, PRN  acetaminophen tablet 650 mg  650 mg, Oral, PRN  albuterol-ipratropium 2.5 mg-0.5 mg/3 mL nebulizer solution 3 mL  3 mL, Nebulization, PRN  methylPREDNISolone sodium succinate injection 40 mg  40 mg, Intravenous, PRN  EPINEPHrine (PF) injection 0.3 mg  0.3 mg, Subcutaneous, PRN  Flushes  0.9% NaCl 250 mL flush bag  Intravenous, Every visit  sodium chloride 0.9% flush 10 mL  10 mL, Intravenous, Every visit  heparin, porcine (PF) 100 unit/mL injection flush 500 Units  500 Units, Intravenous, Every visit  alteplase injection 2 mg  2 mg, Intra-Catheter, Every visit      No therapy plan of the specified type found.    No therapy plan of the specified type found.       Mr. Olson will proceed with gemzar and Abraxane and will return in 2 weeks for next cycle    COPD: Refilled Albuterol        Visit today included increased complexity associated with the care of the episodic problem palliative chemotherapy  addressed and managing the longitudinal care of the patient due to the serious and/or complex managed problem(s)     Above care plan was discussed with patient and accompanying son and all questions were addressed to their satisfaction

## 2025-02-11 ENCOUNTER — RESEARCH ENCOUNTER (OUTPATIENT)
Dept: RESEARCH | Facility: HOSPITAL | Age: 85
End: 2025-02-11
Payer: OTHER GOVERNMENT

## 2025-02-11 ENCOUNTER — OFFICE VISIT (OUTPATIENT)
Dept: HEMATOLOGY/ONCOLOGY | Facility: CLINIC | Age: 85
End: 2025-02-11
Payer: MEDICARE

## 2025-02-11 ENCOUNTER — LAB VISIT (OUTPATIENT)
Dept: LAB | Facility: HOSPITAL | Age: 85
End: 2025-02-11
Attending: INTERNAL MEDICINE
Payer: MEDICARE

## 2025-02-11 VITALS
DIASTOLIC BLOOD PRESSURE: 70 MMHG | HEART RATE: 96 BPM | TEMPERATURE: 99 F | OXYGEN SATURATION: 96 % | RESPIRATION RATE: 16 BRPM | HEIGHT: 68 IN | SYSTOLIC BLOOD PRESSURE: 122 MMHG | BODY MASS INDEX: 25.94 KG/M2 | WEIGHT: 171.19 LBS

## 2025-02-11 DIAGNOSIS — J43.8 OTHER EMPHYSEMA: ICD-10-CM

## 2025-02-11 DIAGNOSIS — C78.01 MALIGNANT NEOPLASM METASTATIC TO BOTH LUNGS: Primary | ICD-10-CM

## 2025-02-11 DIAGNOSIS — Z79.899 IMMUNODEFICIENCY DUE TO CHEMOTHERAPY: ICD-10-CM

## 2025-02-11 DIAGNOSIS — C78.01 MALIGNANT NEOPLASM METASTATIC TO BOTH LUNGS: ICD-10-CM

## 2025-02-11 DIAGNOSIS — C78.02 MALIGNANT NEOPLASM METASTATIC TO BOTH LUNGS: ICD-10-CM

## 2025-02-11 DIAGNOSIS — T45.1X5A IMMUNODEFICIENCY DUE TO CHEMOTHERAPY: ICD-10-CM

## 2025-02-11 DIAGNOSIS — D84.821 IMMUNODEFICIENCY DUE TO CHEMOTHERAPY: ICD-10-CM

## 2025-02-11 DIAGNOSIS — C25.1 MALIGNANT NEOPLASM OF BODY OF PANCREAS: Primary | ICD-10-CM

## 2025-02-11 DIAGNOSIS — C25.1 MALIGNANT NEOPLASM OF BODY OF PANCREAS: ICD-10-CM

## 2025-02-11 DIAGNOSIS — C78.02 MALIGNANT NEOPLASM METASTATIC TO BOTH LUNGS: Primary | ICD-10-CM

## 2025-02-11 DIAGNOSIS — Z00.6 EXAMINATION OF PARTICIPANT IN CLINICAL TRIAL: ICD-10-CM

## 2025-02-11 PROBLEM — R50.9 FEVER: Status: RESOLVED | Noted: 2025-01-18 | Resolved: 2025-02-11

## 2025-02-11 LAB
ALBUMIN SERPL BCP-MCNC: 3.1 G/DL (ref 3.5–5.2)
ALP SERPL-CCNC: 102 U/L (ref 40–150)
ALT SERPL W/O P-5'-P-CCNC: 33 U/L (ref 10–44)
ANION GAP SERPL CALC-SCNC: 10 MMOL/L (ref 8–16)
AST SERPL-CCNC: 34 U/L (ref 10–40)
BASOPHILS # BLD AUTO: 0.05 K/UL (ref 0–0.2)
BASOPHILS NFR BLD: 0.4 % (ref 0–1.9)
BILIRUB SERPL-MCNC: 0.6 MG/DL (ref 0.1–1)
BUN SERPL-MCNC: 10 MG/DL (ref 8–23)
CALCIUM SERPL-MCNC: 8.5 MG/DL (ref 8.7–10.5)
CHLORIDE SERPL-SCNC: 102 MMOL/L (ref 95–110)
CO2 SERPL-SCNC: 26 MMOL/L (ref 23–29)
CREAT SERPL-MCNC: 0.9 MG/DL (ref 0.5–1.4)
DIFFERENTIAL METHOD BLD: ABNORMAL
EOSINOPHIL # BLD AUTO: 0.1 K/UL (ref 0–0.5)
EOSINOPHIL NFR BLD: 1 % (ref 0–8)
ERYTHROCYTE [DISTWIDTH] IN BLOOD BY AUTOMATED COUNT: 15.9 % (ref 11.5–14.5)
EST. GFR  (NO RACE VARIABLE): >60 ML/MIN/1.73 M^2
GLUCOSE SERPL-MCNC: 145 MG/DL (ref 70–110)
HCT VFR BLD AUTO: 31.2 % (ref 40–54)
HGB BLD-MCNC: 10 G/DL (ref 14–18)
IMM GRANULOCYTES # BLD AUTO: 0.23 K/UL (ref 0–0.04)
IMM GRANULOCYTES NFR BLD AUTO: 1.7 % (ref 0–0.5)
LYMPHOCYTES # BLD AUTO: 2.7 K/UL (ref 1–4.8)
LYMPHOCYTES NFR BLD: 20 % (ref 18–48)
MCH RBC QN AUTO: 29.2 PG (ref 27–31)
MCHC RBC AUTO-ENTMCNC: 32.1 G/DL (ref 32–36)
MCV RBC AUTO: 91 FL (ref 82–98)
MONOCYTES # BLD AUTO: 1.1 K/UL (ref 0.3–1)
MONOCYTES NFR BLD: 8.5 % (ref 4–15)
NEUTROPHILS # BLD AUTO: 9.2 K/UL (ref 1.8–7.7)
NEUTROPHILS NFR BLD: 68.4 % (ref 38–73)
NRBC BLD-RTO: 0 /100 WBC
PLATELET # BLD AUTO: 277 K/UL (ref 150–450)
PMV BLD AUTO: 9.6 FL (ref 9.2–12.9)
POTASSIUM SERPL-SCNC: 4.4 MMOL/L (ref 3.5–5.1)
PROT SERPL-MCNC: 7 G/DL (ref 6–8.4)
RBC # BLD AUTO: 3.42 M/UL (ref 4.6–6.2)
SODIUM SERPL-SCNC: 138 MMOL/L (ref 136–145)
WBC # BLD AUTO: 13.46 K/UL (ref 3.9–12.7)

## 2025-02-11 PROCEDURE — G2211 COMPLEX E/M VISIT ADD ON: HCPCS | Mod: S$PBB,,, | Performed by: INTERNAL MEDICINE

## 2025-02-11 PROCEDURE — 36415 COLL VENOUS BLD VENIPUNCTURE: CPT | Mod: PN | Performed by: INTERNAL MEDICINE

## 2025-02-11 PROCEDURE — 80053 COMPREHEN METABOLIC PANEL: CPT | Mod: PN | Performed by: INTERNAL MEDICINE

## 2025-02-11 PROCEDURE — 99214 OFFICE O/P EST MOD 30 MIN: CPT | Mod: PBBFAC,PN | Performed by: INTERNAL MEDICINE

## 2025-02-11 PROCEDURE — 99215 OFFICE O/P EST HI 40 MIN: CPT | Mod: S$PBB,,, | Performed by: INTERNAL MEDICINE

## 2025-02-11 PROCEDURE — 85025 COMPLETE CBC W/AUTO DIFF WBC: CPT | Mod: PN | Performed by: INTERNAL MEDICINE

## 2025-02-11 PROCEDURE — 99999 PR PBB SHADOW E&M-EST. PATIENT-LVL IV: CPT | Mod: PBBFAC,,, | Performed by: INTERNAL MEDICINE

## 2025-02-11 RX ORDER — SODIUM CHLORIDE 0.9 % (FLUSH) 0.9 %
10 SYRINGE (ML) INJECTION
Status: CANCELLED | OUTPATIENT
Start: 2025-02-12

## 2025-02-11 RX ORDER — EPINEPHRINE 0.3 MG/.3ML
0.3 INJECTION SUBCUTANEOUS ONCE AS NEEDED
Status: CANCELLED | OUTPATIENT
Start: 2025-02-12

## 2025-02-11 RX ORDER — DIPHENHYDRAMINE HYDROCHLORIDE 50 MG/ML
50 INJECTION INTRAMUSCULAR; INTRAVENOUS ONCE AS NEEDED
Status: CANCELLED | OUTPATIENT
Start: 2025-02-12

## 2025-02-11 RX ORDER — ONDANSETRON HYDROCHLORIDE 2 MG/ML
8 INJECTION, SOLUTION INTRAVENOUS
Status: CANCELLED | OUTPATIENT
Start: 2025-02-12

## 2025-02-11 RX ORDER — PROCHLORPERAZINE EDISYLATE 5 MG/ML
5 INJECTION INTRAMUSCULAR; INTRAVENOUS ONCE AS NEEDED
Status: CANCELLED | OUTPATIENT
Start: 2025-02-12

## 2025-02-11 RX ORDER — ALBUTEROL SULFATE 0.83 MG/ML
2.5 SOLUTION RESPIRATORY (INHALATION) EVERY 6 HOURS PRN
Qty: 100 ML | Refills: 5 | Status: SHIPPED | OUTPATIENT
Start: 2025-02-11 | End: 2026-02-11

## 2025-02-11 RX ORDER — HEPARIN 100 UNIT/ML
500 SYRINGE INTRAVENOUS
Status: CANCELLED | OUTPATIENT
Start: 2025-02-12

## 2025-02-11 NOTE — PROGRESS NOTES
February 11th, 2025     Protocol: Early Detection of Pancreatic Cancer: Prospective Study  IRB # [id number]  PI: Cecil Day MD  Version Date:01/11/2024  PID: [id number]     Informed Consent   Patient found to be eligible by Dr. Shay Benrardo, research coordinator, met with the patient and his son in the clinic exam room following an appointment with Dr. Dunne to discuss the above-mentioned protocol. He is alert and oriented x 3. Mood and affect appropriate to the situation.    Purpose of the study reviewed with the patient. Patient states understanding of this information.   Study procedures discussed in detail with the patient to include: screening and baseline assessments as well as all information that will be taken from his chart.  Patient verbalized understanding of this information.  Patient responsibilities discussed over in detail with patient . Pt also informed about what will happen to his test samples.  Pt voiced understanding.   Risks discussed over with patient to include blood draws, psychological and economic in nature.  Pt voices understanding.  Explained to the patient that researchers will be studying his DNA Sequencing in his blood sample. He was also informed that during the testing process there may be Unexpected Findings but since they are not of the same quality as he would receive during regular health care assessments he will not be informed of these findings.  Pt voiced understanding.     Voluntary participation and withdrawal from research stressed to patient; he states he understands that he may withdraw consent at any time without compromise to his care.   Confidentiality and HIPAA discussed with patient; process of protection and security of database explained to patient; he verbalizes understanding of this information.     Throughout the consenting process, the patient and his son were given several opportunities to ask questions; all questions addressed and answered to their  satisfaction. Patient states his willingness to participate in the study. Patient signed and dated the consent form; copy of the consent form given to patient along with my contact information. Instructed patient to notify this CRC or Dr. Day for any questions or concerns. Patient verbalized understanding.     Blood draw and Questionnaire:     After reviewing and signing the consent, study questionnaire was administered to and completed with the patient. The patient was then followed to the 1st floor lab to have study blood collected.  Labs were then collected by the phlebotomist, using two patient identifiers, with CRC present. Labs were processed and placed in -80 freezer per [facility] protocol.    The patient again denied having any questions or concerns. Encouraged the patient to call if any arise in the future.     Giulia Bernardo, CCRC

## 2025-02-12 ENCOUNTER — INFUSION (OUTPATIENT)
Dept: INFUSION THERAPY | Facility: HOSPITAL | Age: 85
End: 2025-02-12
Attending: INTERNAL MEDICINE
Payer: MEDICARE

## 2025-02-12 VITALS
DIASTOLIC BLOOD PRESSURE: 67 MMHG | TEMPERATURE: 99 F | WEIGHT: 172.19 LBS | HEART RATE: 86 BPM | OXYGEN SATURATION: 98 % | RESPIRATION RATE: 16 BRPM | BODY MASS INDEX: 26.1 KG/M2 | HEIGHT: 68 IN | SYSTOLIC BLOOD PRESSURE: 115 MMHG

## 2025-02-12 DIAGNOSIS — K51.018 ULCERATIVE PANCOLITIS WITH OTHER COMPLICATION: ICD-10-CM

## 2025-02-12 DIAGNOSIS — C25.1 MALIGNANT NEOPLASM OF BODY OF PANCREAS: Primary | ICD-10-CM

## 2025-02-12 PROCEDURE — 63600175 PHARM REV CODE 636 W HCPCS: Mod: JZ,TB,PN | Performed by: INTERNAL MEDICINE

## 2025-02-12 PROCEDURE — 96377 APPLICATON ON-BODY INJECTOR: CPT | Mod: PN

## 2025-02-12 PROCEDURE — 96367 TX/PROPH/DG ADDL SEQ IV INF: CPT | Mod: PN

## 2025-02-12 PROCEDURE — 96413 CHEMO IV INFUSION 1 HR: CPT | Mod: PN

## 2025-02-12 PROCEDURE — 25000003 PHARM REV CODE 250: Mod: PN | Performed by: INTERNAL MEDICINE

## 2025-02-12 PROCEDURE — A4216 STERILE WATER/SALINE, 10 ML: HCPCS | Mod: PN | Performed by: INTERNAL MEDICINE

## 2025-02-12 PROCEDURE — 96375 TX/PRO/DX INJ NEW DRUG ADDON: CPT | Mod: PN

## 2025-02-12 RX ORDER — EPINEPHRINE 0.3 MG/.3ML
0.3 INJECTION SUBCUTANEOUS ONCE AS NEEDED
Status: DISCONTINUED | OUTPATIENT
Start: 2025-02-12 | End: 2025-02-12 | Stop reason: HOSPADM

## 2025-02-12 RX ORDER — HEPARIN 100 UNIT/ML
500 SYRINGE INTRAVENOUS
OUTPATIENT
Start: 2025-03-12

## 2025-02-12 RX ORDER — HEPARIN 100 UNIT/ML
500 SYRINGE INTRAVENOUS
Status: DISCONTINUED | OUTPATIENT
Start: 2025-02-12 | End: 2025-02-12 | Stop reason: HOSPADM

## 2025-02-12 RX ORDER — ACETAMINOPHEN 325 MG/1
650 TABLET ORAL
OUTPATIENT
Start: 2025-03-12

## 2025-02-12 RX ORDER — ONDANSETRON HYDROCHLORIDE 2 MG/ML
8 INJECTION, SOLUTION INTRAVENOUS
Status: COMPLETED | OUTPATIENT
Start: 2025-02-12 | End: 2025-02-12

## 2025-02-12 RX ORDER — SODIUM CHLORIDE 0.9 % (FLUSH) 0.9 %
10 SYRINGE (ML) INJECTION
Status: DISCONTINUED | OUTPATIENT
Start: 2025-02-12 | End: 2025-02-12 | Stop reason: HOSPADM

## 2025-02-12 RX ORDER — PROCHLORPERAZINE EDISYLATE 5 MG/ML
5 INJECTION INTRAMUSCULAR; INTRAVENOUS ONCE AS NEEDED
Status: DISCONTINUED | OUTPATIENT
Start: 2025-02-12 | End: 2025-02-12 | Stop reason: HOSPADM

## 2025-02-12 RX ORDER — DIPHENHYDRAMINE HYDROCHLORIDE 50 MG/ML
50 INJECTION INTRAMUSCULAR; INTRAVENOUS ONCE AS NEEDED
Status: DISCONTINUED | OUTPATIENT
Start: 2025-02-12 | End: 2025-02-12 | Stop reason: HOSPADM

## 2025-02-12 RX ORDER — DIPHENHYDRAMINE HYDROCHLORIDE 50 MG/ML
25 INJECTION INTRAMUSCULAR; INTRAVENOUS
OUTPATIENT
Start: 2025-03-12

## 2025-02-12 RX ORDER — SODIUM CHLORIDE 0.9 % (FLUSH) 0.9 %
10 SYRINGE (ML) INJECTION
OUTPATIENT
Start: 2025-03-12

## 2025-02-12 RX ORDER — METHYLPREDNISOLONE SOD SUCC 125 MG
40 VIAL (EA) INJECTION
OUTPATIENT
Start: 2025-03-12

## 2025-02-12 RX ORDER — EPINEPHRINE 1 MG/ML
0.3 INJECTION, SOLUTION, CONCENTRATE INTRAVENOUS
OUTPATIENT
Start: 2025-03-12

## 2025-02-12 RX ORDER — IPRATROPIUM BROMIDE AND ALBUTEROL SULFATE 2.5; .5 MG/3ML; MG/3ML
3 SOLUTION RESPIRATORY (INHALATION)
OUTPATIENT
Start: 2025-03-12

## 2025-02-12 RX ADMIN — VEDOLIZUMAB 300 MG: 300 INJECTION, POWDER, LYOPHILIZED, FOR SOLUTION INTRAVENOUS at 02:02

## 2025-02-12 RX ADMIN — SODIUM CHLORIDE: 9 INJECTION, SOLUTION INTRAVENOUS at 02:02

## 2025-02-12 RX ADMIN — GEMCITABINE HYDROCHLORIDE 2000 MG: 2 INJECTION, SOLUTION INTRAVENOUS at 03:02

## 2025-02-12 RX ADMIN — ONDANSETRON 8 MG: 2 INJECTION INTRAMUSCULAR; INTRAVENOUS at 02:02

## 2025-02-12 RX ADMIN — Medication 10 ML: at 04:02

## 2025-02-12 RX ADMIN — PEGFILGRASTIM 6 MG: KIT SUBCUTANEOUS at 04:02

## 2025-02-12 RX ADMIN — PACLITAXEL 240 MG: 100 INJECTION, POWDER, LYOPHILIZED, FOR SUSPENSION INTRAVENOUS at 02:02

## 2025-02-12 NOTE — PLAN OF CARE
Problem: Adult Inpatient Plan of Care  Goal: Patient-Specific Goal (Individualized)  Outcome: Progressing  Flowsheets (Taken 2/12/2025 1654)  Individualized Care Needs: Recliner, blanket  Anxieties, Fears or Concerns: None     Problem: Fatigue  Goal: Improved Activity Tolerance  Intervention: Promote Improved Energy  Flowsheets (Taken 2/12/2025 1654)  Fatigue Management:   activity schedule adjusted   paced activity encouraged  Sleep/Rest Enhancement: relaxation techniques promoted  Activity Management:   Ambulated -L4   Ambulated in [place] - L4  Environmental Support:   calm environment promoted   environmental consistency promoted     Problem: Adult Inpatient Plan of Care  Goal: Plan of Care Review  Outcome: Progressing  Flowsheets (Taken 2/12/2025 1654)  Plan of Care Reviewed With: patient  Tolerated treatment with no known distress.  Ambulated from infusion center with steady gait.

## 2025-02-25 ENCOUNTER — LAB VISIT (OUTPATIENT)
Dept: LAB | Facility: HOSPITAL | Age: 85
End: 2025-02-25
Attending: INTERNAL MEDICINE
Payer: MEDICARE

## 2025-02-25 ENCOUNTER — OFFICE VISIT (OUTPATIENT)
Dept: HEMATOLOGY/ONCOLOGY | Facility: CLINIC | Age: 85
End: 2025-02-25
Payer: MEDICARE

## 2025-02-25 VITALS
SYSTOLIC BLOOD PRESSURE: 117 MMHG | OXYGEN SATURATION: 96 % | TEMPERATURE: 98 F | BODY MASS INDEX: 26.4 KG/M2 | RESPIRATION RATE: 16 BRPM | DIASTOLIC BLOOD PRESSURE: 63 MMHG | HEIGHT: 68 IN | HEART RATE: 98 BPM | WEIGHT: 174.19 LBS

## 2025-02-25 DIAGNOSIS — T45.1X5A IMMUNODEFICIENCY DUE TO CHEMOTHERAPY: ICD-10-CM

## 2025-02-25 DIAGNOSIS — K52.1 CHEMOTHERAPY INDUCED DIARRHEA: ICD-10-CM

## 2025-02-25 DIAGNOSIS — Z79.899 IMMUNODEFICIENCY DUE TO CHEMOTHERAPY: ICD-10-CM

## 2025-02-25 DIAGNOSIS — T45.1X5A CHEMOTHERAPY INDUCED DIARRHEA: ICD-10-CM

## 2025-02-25 DIAGNOSIS — C25.1 MALIGNANT NEOPLASM OF BODY OF PANCREAS: Primary | ICD-10-CM

## 2025-02-25 DIAGNOSIS — K51.80 OTHER ULCERATIVE COLITIS WITHOUT COMPLICATION: ICD-10-CM

## 2025-02-25 DIAGNOSIS — C78.02 MALIGNANT NEOPLASM METASTATIC TO BOTH LUNGS: ICD-10-CM

## 2025-02-25 DIAGNOSIS — D84.821 IMMUNODEFICIENCY DUE TO CHEMOTHERAPY: ICD-10-CM

## 2025-02-25 DIAGNOSIS — C78.01 MALIGNANT NEOPLASM METASTATIC TO BOTH LUNGS: ICD-10-CM

## 2025-02-25 DIAGNOSIS — C25.1 MALIGNANT NEOPLASM OF BODY OF PANCREAS: ICD-10-CM

## 2025-02-25 LAB
ALBUMIN SERPL BCP-MCNC: 3.3 G/DL (ref 3.5–5.2)
ALP SERPL-CCNC: 120 U/L (ref 40–150)
ALT SERPL W/O P-5'-P-CCNC: 35 U/L (ref 10–44)
ANION GAP SERPL CALC-SCNC: 10 MMOL/L (ref 8–16)
AST SERPL-CCNC: 40 U/L (ref 10–40)
BASOPHILS # BLD AUTO: 0.06 K/UL (ref 0–0.2)
BASOPHILS NFR BLD: 0.3 % (ref 0–1.9)
BILIRUB SERPL-MCNC: 0.5 MG/DL (ref 0.1–1)
BUN SERPL-MCNC: 15 MG/DL (ref 8–23)
CALCIUM SERPL-MCNC: 8.6 MG/DL (ref 8.7–10.5)
CHLORIDE SERPL-SCNC: 103 MMOL/L (ref 95–110)
CO2 SERPL-SCNC: 24 MMOL/L (ref 23–29)
CREAT SERPL-MCNC: 0.9 MG/DL (ref 0.5–1.4)
DIFFERENTIAL METHOD BLD: ABNORMAL
EOSINOPHIL # BLD AUTO: 0.1 K/UL (ref 0–0.5)
EOSINOPHIL NFR BLD: 0.5 % (ref 0–8)
ERYTHROCYTE [DISTWIDTH] IN BLOOD BY AUTOMATED COUNT: 17.8 % (ref 11.5–14.5)
EST. GFR  (NO RACE VARIABLE): >60 ML/MIN/1.73 M^2
GLUCOSE SERPL-MCNC: 137 MG/DL (ref 70–110)
HCT VFR BLD AUTO: 29.6 % (ref 40–54)
HGB BLD-MCNC: 9.7 G/DL (ref 14–18)
IMM GRANULOCYTES # BLD AUTO: 0.46 K/UL (ref 0–0.04)
IMM GRANULOCYTES NFR BLD AUTO: 2.5 % (ref 0–0.5)
LYMPHOCYTES # BLD AUTO: 3.5 K/UL (ref 1–4.8)
LYMPHOCYTES NFR BLD: 18.6 % (ref 18–48)
MCH RBC QN AUTO: 30.1 PG (ref 27–31)
MCHC RBC AUTO-ENTMCNC: 32.8 G/DL (ref 32–36)
MCV RBC AUTO: 92 FL (ref 82–98)
MONOCYTES # BLD AUTO: 1.4 K/UL (ref 0.3–1)
MONOCYTES NFR BLD: 7.5 % (ref 4–15)
NEUTROPHILS # BLD AUTO: 13.1 K/UL (ref 1.8–7.7)
NEUTROPHILS NFR BLD: 70.6 % (ref 38–73)
NRBC BLD-RTO: 0 /100 WBC
PLATELET # BLD AUTO: 219 K/UL (ref 150–450)
PMV BLD AUTO: 10.2 FL (ref 9.2–12.9)
POTASSIUM SERPL-SCNC: 4.5 MMOL/L (ref 3.5–5.1)
PROT SERPL-MCNC: 7.1 G/DL (ref 6–8.4)
RBC # BLD AUTO: 3.22 M/UL (ref 4.6–6.2)
SODIUM SERPL-SCNC: 137 MMOL/L (ref 136–145)
WBC # BLD AUTO: 18.61 K/UL (ref 3.9–12.7)

## 2025-02-25 PROCEDURE — 36415 COLL VENOUS BLD VENIPUNCTURE: CPT | Mod: PN | Performed by: INTERNAL MEDICINE

## 2025-02-25 PROCEDURE — 99999 PR PBB SHADOW E&M-EST. PATIENT-LVL IV: CPT | Mod: PBBFAC,,, | Performed by: NURSE PRACTITIONER

## 2025-02-25 PROCEDURE — G2211 COMPLEX E/M VISIT ADD ON: HCPCS | Mod: S$PBB,,, | Performed by: NURSE PRACTITIONER

## 2025-02-25 PROCEDURE — 85025 COMPLETE CBC W/AUTO DIFF WBC: CPT | Mod: PN | Performed by: INTERNAL MEDICINE

## 2025-02-25 PROCEDURE — 99215 OFFICE O/P EST HI 40 MIN: CPT | Mod: S$PBB,,, | Performed by: NURSE PRACTITIONER

## 2025-02-25 PROCEDURE — 80053 COMPREHEN METABOLIC PANEL: CPT | Mod: PN | Performed by: INTERNAL MEDICINE

## 2025-02-25 PROCEDURE — 99214 OFFICE O/P EST MOD 30 MIN: CPT | Mod: PBBFAC,PN | Performed by: NURSE PRACTITIONER

## 2025-02-25 RX ORDER — ONDANSETRON HYDROCHLORIDE 2 MG/ML
8 INJECTION, SOLUTION INTRAVENOUS
Status: CANCELLED | OUTPATIENT
Start: 2025-02-26

## 2025-02-25 RX ORDER — PROCHLORPERAZINE EDISYLATE 5 MG/ML
5 INJECTION INTRAMUSCULAR; INTRAVENOUS ONCE AS NEEDED
Status: CANCELLED | OUTPATIENT
Start: 2025-02-26

## 2025-02-25 RX ORDER — DIPHENHYDRAMINE HYDROCHLORIDE 50 MG/ML
50 INJECTION INTRAMUSCULAR; INTRAVENOUS ONCE AS NEEDED
Status: CANCELLED | OUTPATIENT
Start: 2025-02-26

## 2025-02-25 RX ORDER — SODIUM CHLORIDE 0.9 % (FLUSH) 0.9 %
10 SYRINGE (ML) INJECTION
Status: CANCELLED | OUTPATIENT
Start: 2025-02-26

## 2025-02-25 RX ORDER — DIPHENOXYLATE HYDROCHLORIDE AND ATROPINE SULFATE 2.5; .025 MG/1; MG/1
1 TABLET ORAL 4 TIMES DAILY PRN
Qty: 40 TABLET | Refills: 2 | Status: SHIPPED | OUTPATIENT
Start: 2025-02-25 | End: 2025-03-27

## 2025-02-25 RX ORDER — HEPARIN 100 UNIT/ML
500 SYRINGE INTRAVENOUS
Status: CANCELLED | OUTPATIENT
Start: 2025-02-26

## 2025-02-25 RX ORDER — EPINEPHRINE 0.3 MG/.3ML
0.3 INJECTION SUBCUTANEOUS ONCE AS NEEDED
Status: CANCELLED | OUTPATIENT
Start: 2025-02-26

## 2025-02-25 NOTE — PROGRESS NOTES
Subjective     Patient ID: Alfie Olson is a 84 y.o. male.    Chief Complaint: Follow-up (Malignant neoplasm of body of pancreas, treatment clearance)      Oncology History (per record): Mr. Olson underwent a CT of the abdomen 11/22/2024 for ulcerative colitis follow-up, which incidentally revealed new findings in the lower portion of the lungs. 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 CT of the chest from 2019 shows pleural-based nodules with calcifications consistent with asbestos-related pleural plaques.     Mr. Olson has ulcerative colitis managed with Entyvio infusions at the clinic. He also reports prior diagnoses of COPD and asthma, controlled with an inhaler. His cardiac history includes one stent placed 2-3 years ago, for which he takes Plavix. [facility]     CT chest on 11/27/2024: Interval development of numerous spiculated pulmonary nodules and masses scattered throughout the bilateral lungs.  Findings are highly concerning for malignancy/metastatic disease, however differential also includes septic emboli.  Recommend correlation with tissue sampling.  2. Redemonstration of scattered bilateral calcified and noncalcified pleural plaques.  3. Mild bilateral peripheral predominant interstitial reticulation and ground-glass density suggestive for interstitial lung disease.  4. Trace left pleural fluid.  5. Several hypodense lesions in the pancreatic body/tail which could represent cysts or cystic neoplasm.  Recommend further evaluation with contrast enhanced MRI MRCP pancreatic mass protocol.     PET scan on 11/27/2024: . Innumerable pleural and parenchymal bilateral lung nodules with FDG uptake suggestive of metastatic disease or mesothelioma.  2. Nodule in the left axilla subcutaneous tissues with low level uptake is indeterminate.  Direct visualization  "warranted.  3. Partially calcified mildly enlarged mediastinal lymph nodes with low level uptake are favored to be inflammatory.     MRI/MRCP on 12/11/2024: Probable pancreatic pseudocyst or side duct ectasia in the pancreatic tail.  No suspicious pancreatic lesions.     He underwent biopsy on 12/12/2024. Pathology reveals LINGULA, BIOPSY:--POSITIVE FOR ADENOCARCINOMA.      2. LUNG, RIGHT MIDDLE LOBE NODULE, BIOPSY:--POSITIVE FOR ADENOCARCINOMA.       Comment: The morphologic and immunophenotypic features are not entirely specific    but are most suggestive of pancreaticobiliary primary. Immunohistochemistry was performed with appropriate controls on block 1A based on the histopathologic findings and the results are summarized as  follows:     CK19: Positive. : Positive      He went to the ED on 12/29 with hemoptysis. CTA chest on 12/29/24 revealed "A pulmonary embolus is not demonstrated. Multiple densities bilaterally with a differential of pneumonia and metastatic disease. Small left-sided pleural effusion"  He completed oral antibiotics and his hemoptysis resolved     He started palliative chemo with Gemzar and Abraxane on 01/14/2025    He was hospitalized 01/18/2025 for fever of 100.4, no etiology was found for the infection and he was discharged without any antibiotics  He received cycle 1 day 15 of chemotherapy on 01/29/2025      HPI He comes in today for cycle 2 day 15 of palliative chemotherapy with Gemzar and Abraxane.    Since the last visit, he endorses having some nausea and diarrhea for 4-5 days after his cycle.  Antiemetics helped with nausea. Has been compliant with creon, Has taken imodium but didn't seem to completely relieve the diarrhea. He was having at least 6 diarrhea episodes per day. He has not tried Lomotil.    He also has ulcerative colitis.  He does endorse some mouth irritation from bottom dentures, but rinses with his baking soda mouth rinse and this seems to resolve the " issue.    He does have mucus production and he has to clear his nose and his right nostril was bleeding the week after chemo. This has been present since before starting chemo.     Patient states he also started experiencing some dizzinesa a few days ago.     Does have SOB which is at his baseline.   ECOG PS is zero. He is here with his son    Review of Systems   Constitutional:  Negative for appetite change, fatigue and unexpected weight change.   HENT:  Negative for mouth sores.    Eyes:  Negative for visual disturbance.   Respiratory:  Negative for cough and shortness of breath.    Cardiovascular:  Negative for chest pain.   Gastrointestinal:  Negative for abdominal pain and diarrhea.   Genitourinary:  Negative for frequency.   Musculoskeletal:  Negative for back pain.   Integumentary:  Negative for rash.   Neurological:  Positive for dizziness. Negative for headaches.   Hematological:  Negative for adenopathy.   Psychiatric/Behavioral:  The patient is not nervous/anxious.    All other systems reviewed and are negative.         Objective     Physical Exam  Vitals reviewed.   Constitutional:       General: He is not in acute distress.     Appearance: He is not diaphoretic.   Neurological:      Mental Status: He is alert.        Labs:  WBC   Date Value Ref Range Status   02/25/2025 18.61 (H) 3.90 - 12.70 K/uL Final     Hemoglobin   Date Value Ref Range Status   02/25/2025 9.7 (L) 14.0 - 18.0 g/dL Final     Hematocrit   Date Value Ref Range Status   02/25/2025 29.6 (L) 40.0 - 54.0 % Final     Platelets   Date Value Ref Range Status   02/25/2025 219 150 - 450 K/uL Final     Gran # (ANC)   Date Value Ref Range Status   02/25/2025 13.1 (H) 1.8 - 7.7 K/uL Final     Gran %   Date Value Ref Range Status   02/25/2025 70.6 38.0 - 73.0 % Final       Chemistry        Component Value Date/Time     02/25/2025 1213    K 4.5 02/25/2025 1213     02/25/2025 1213    CO2 24 02/25/2025 1213    BUN 15 02/25/2025 1213     CREATININE 0.9 02/25/2025 1213     (H) 02/25/2025 1213        Component Value Date/Time    CALCIUM 8.6 (L) 02/25/2025 1213    ALKPHOS 120 02/25/2025 1213    AST 40 02/25/2025 1213    ALT 35 02/25/2025 1213    BILITOT 0.5 02/25/2025 1213        Vitals:    02/25/25 1309   BP: 117/63   Pulse: 98   Resp: 16   Temp: 97.5 °F (36.4 °C)       Wt Readings from Last 3 Encounters:   02/25/25 79 kg (174 lb 2.6 oz)   02/12/25 78.1 kg (172 lb 2.9 oz)   02/11/25 77.7 kg (171 lb 3 oz)       Assessment and Plan     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 Malignant neoplasm metastatic to both lungs    3. Immunodeficiency due to chemotherapy      Route Chart for Scheduling    Med Onc Chart Routing      Follow up with physician . As scheduled for CBC, CMP and mag on 3/10/25 then VV with Dr. Dunne. Treatment on 3/12/25.   Follow up with ROMAAN    Infusion scheduling note    Injection scheduling note    Labs    Imaging    Pharmacy appointment    Other referrals                Treatment Plan Information   OP GI modified nab-PACLitaxel + gemcitabine Q4W Lacy Dunne MD   Associated diagnosis: Malignant neoplasm of body of pancreas Stage IV cT1c, cN0, cM1 noted on 12/26/2024   Line of treatment: First Line  Treatment Goal: Palliative     Upcoming Treatment Dates - OP GI modified nab-PACLitaxel + gemcitabine Q4W    2/26/2025       Chemotherapy       PACLitaxeL protein-bound (ABRAXANE) 125 mg/m2 = 240 mg in 48 mL infusion       gemcitabine (GEMZAR) 1,950 mg in 0.9% NaCl 269.5 mL chemo infusion       Antiemetics       ondansetron injection 8 mg  3/12/2025       Chemotherapy       PACLitaxeL protein-bound (ABRAXANE) 125 mg/m2 = 240 mg in 48 mL infusion       gemcitabine (GEMZAR) 1,950 mg in 0.9% NaCl 269.5 mL chemo infusion       Antiemetics       ondansetron injection 8 mg  3/26/2025       Chemotherapy       PACLitaxeL protein-bound (ABRAXANE) 125 mg/m2 = 240 mg in 48 mL infusion       gemcitabine (GEMZAR) 1,950 mg in 0.9% NaCl  269.5 mL chemo infusion       Antiemetics       ondansetron injection 8 mg  4/9/2025       Chemotherapy       PACLitaxeL protein-bound (ABRAXANE) 125 mg/m2 = 240 mg in 48 mL infusion       gemcitabine (GEMZAR) 1,950 mg in 0.9% NaCl 269.5 mL chemo infusion       Antiemetics       ondansetron injection 8 mg    Therapy Plan Information  ENTYVIO GI for Ulcerative colitis, noted on 10/20/2022  Medications  vedolizumab (ENTYVIO) 300 mg/250 mL NS IVPB  300 mg, Intravenous, Every 4 weeks  PRN Medications  diphenhydrAMINE injection 25 mg  25 mg, Intravenous, PRN  acetaminophen tablet 650 mg  650 mg, Oral, PRN  albuterol-ipratropium 2.5 mg-0.5 mg/3 mL nebulizer solution 3 mL  3 mL, Nebulization, PRN  methylPREDNISolone sodium succinate injection 40 mg  40 mg, Intravenous, PRN  EPINEPHrine (PF) injection 0.3 mg  0.3 mg, Subcutaneous, PRN  Flushes  0.9% NaCl 250 mL flush bag  Intravenous, Every visit  sodium chloride 0.9% flush 10 mL  10 mL, Intravenous, Every visit  heparin, porcine (PF) 100 unit/mL injection flush 500 Units  500 Units, Intravenous, Every visit  alteplase injection 2 mg  2 mg, Intra-Catheter, Every visit      No therapy plan of the specified type found.    No therapy plan of the specified type found.       Mr. Olson will proceed with Gemzar and Abraxane and will return in 2 weeks for next cycle.    Immunodeficiency due to chemotherapy - at risk for infection  -Receives nuepogen with each cycle  -no s/s of infection  -Will monitor    Chemo-induced diarrhea - Take Imodium and if no resolution, take Lomotil that was sent in today. Instructed to be careful with how much he takes. Only take 1 at a time.     Dizziness - will monitor magnesium level. Also indicates his hearing recently broke and could be contributing.    Visit today included increased complexity associated with the care of the episodic problem palliative chemotherapy  addressed and managing the longitudinal care of the patient due to the serious  and/or complex managed problem(s)     Above care plan was discussed with patient and accompanying son and all questions were addressed to their satisfaction     CARLINE Fields  Hematology and Medical Oncology  [facility]    50 minutes of total time spent on the encounter, which includes face to face time and non-face to face time preparing to see the patient (eg, review of tests), Obtaining and/or reviewing separately obtained history, documenting clinical information in the electronic or other health record, independently interpreting results if documented above (not separately reported) and communicating results to the patient/family/caregiver, or Care coordination (not separately reported).

## 2025-02-26 ENCOUNTER — DOCUMENTATION ONLY (OUTPATIENT)
Dept: INFUSION THERAPY | Facility: HOSPITAL | Age: 85
End: 2025-02-26
Payer: OTHER GOVERNMENT

## 2025-02-26 ENCOUNTER — INFUSION (OUTPATIENT)
Dept: INFUSION THERAPY | Facility: HOSPITAL | Age: 85
End: 2025-02-26
Attending: INTERNAL MEDICINE
Payer: MEDICARE

## 2025-02-26 VITALS
BODY MASS INDEX: 26.4 KG/M2 | SYSTOLIC BLOOD PRESSURE: 124 MMHG | WEIGHT: 174.19 LBS | HEART RATE: 83 BPM | DIASTOLIC BLOOD PRESSURE: 61 MMHG | HEIGHT: 68 IN | TEMPERATURE: 99 F | RESPIRATION RATE: 18 BRPM | OXYGEN SATURATION: 96 %

## 2025-02-26 DIAGNOSIS — C25.1 MALIGNANT NEOPLASM OF BODY OF PANCREAS: Primary | ICD-10-CM

## 2025-02-26 PROCEDURE — 25000003 PHARM REV CODE 250: Mod: PN | Performed by: NURSE PRACTITIONER

## 2025-02-26 PROCEDURE — 96375 TX/PRO/DX INJ NEW DRUG ADDON: CPT | Mod: PN

## 2025-02-26 PROCEDURE — 96377 APPLICATON ON-BODY INJECTOR: CPT | Mod: PN

## 2025-02-26 PROCEDURE — A4216 STERILE WATER/SALINE, 10 ML: HCPCS | Mod: PN | Performed by: NURSE PRACTITIONER

## 2025-02-26 PROCEDURE — 63600175 PHARM REV CODE 636 W HCPCS: Mod: JZ,TB,PN | Performed by: NURSE PRACTITIONER

## 2025-02-26 PROCEDURE — 96413 CHEMO IV INFUSION 1 HR: CPT | Mod: PN

## 2025-02-26 PROCEDURE — 96417 CHEMO IV INFUS EACH ADDL SEQ: CPT | Mod: PN

## 2025-02-26 RX ORDER — EPINEPHRINE 0.3 MG/.3ML
0.3 INJECTION SUBCUTANEOUS ONCE AS NEEDED
Status: DISCONTINUED | OUTPATIENT
Start: 2025-02-26 | End: 2025-02-26 | Stop reason: HOSPADM

## 2025-02-26 RX ORDER — HEPARIN 100 UNIT/ML
500 SYRINGE INTRAVENOUS
Status: DISCONTINUED | OUTPATIENT
Start: 2025-02-26 | End: 2025-02-26 | Stop reason: HOSPADM

## 2025-02-26 RX ORDER — DIPHENHYDRAMINE HYDROCHLORIDE 50 MG/ML
50 INJECTION INTRAMUSCULAR; INTRAVENOUS ONCE AS NEEDED
Status: DISCONTINUED | OUTPATIENT
Start: 2025-02-26 | End: 2025-02-26 | Stop reason: HOSPADM

## 2025-02-26 RX ORDER — SODIUM CHLORIDE 0.9 % (FLUSH) 0.9 %
10 SYRINGE (ML) INJECTION
Status: DISCONTINUED | OUTPATIENT
Start: 2025-02-26 | End: 2025-02-26 | Stop reason: HOSPADM

## 2025-02-26 RX ORDER — ONDANSETRON HYDROCHLORIDE 2 MG/ML
8 INJECTION, SOLUTION INTRAVENOUS
Status: COMPLETED | OUTPATIENT
Start: 2025-02-26 | End: 2025-02-26

## 2025-02-26 RX ORDER — PROCHLORPERAZINE EDISYLATE 5 MG/ML
5 INJECTION INTRAMUSCULAR; INTRAVENOUS ONCE AS NEEDED
Status: DISCONTINUED | OUTPATIENT
Start: 2025-02-26 | End: 2025-02-26 | Stop reason: HOSPADM

## 2025-02-26 RX ADMIN — ONDANSETRON 8 MG: 2 INJECTION INTRAMUSCULAR; INTRAVENOUS at 02:02

## 2025-02-26 RX ADMIN — PACLITAXEL 240 MG: 100 INJECTION, POWDER, LYOPHILIZED, FOR SUSPENSION INTRAVENOUS at 02:02

## 2025-02-26 RX ADMIN — SODIUM CHLORIDE: 9 INJECTION, SOLUTION INTRAVENOUS at 01:02

## 2025-02-26 RX ADMIN — GEMCITABINE HYDROCHLORIDE 2000 MG: 2 INJECTION, SOLUTION INTRAVENOUS at 03:02

## 2025-02-26 RX ADMIN — PEGFILGRASTIM 6 MG: KIT SUBCUTANEOUS at 03:02

## 2025-02-26 RX ADMIN — Medication 10 ML: at 03:02

## 2025-02-26 NOTE — PROGRESS NOTES
LCSW met with patient at the chairside during infusion. Mr. Olson shared that he served in the  in [place] when asked about his 's hat. He shared that it was a big change for him due to the snow. Patient denied any emotional or practical needs at this time.  encouraged the patient to reach out if any needs arise.

## 2025-02-26 NOTE — PROGRESS NOTES
Oncology Nutrition   Chemotherapy Infusion Visit    Nutrition Follow Up   RD met with pt at chairside during infusion tx. Pt present for C2D15 Abraxane/Gemzar/OBI. Pt continues to do well nutritionally - maintaining weight, chewing without difficulty and denies any V/D/C. Pt is having mild nausea he is treating with antiemetics. RD encouraged pt to continue current fluid intake. Pt weight has been stable.       Wt Readings from Last 10 Encounters:   02/26/25 79 kg (174 lb 2.6 oz)   02/25/25 79 kg (174 lb 2.6 oz)   02/12/25 78.1 kg (172 lb 2.9 oz)   02/11/25 77.7 kg (171 lb 3 oz)   01/29/25 80.1 kg (176 lb 9.4 oz)   01/28/25 80.1 kg (176 lb 9.4 oz)   01/18/25 78.8 kg (173 lb 11.6 oz)   01/15/25 79.6 kg (175 lb 7.8 oz)   01/14/25 79.6 kg (175 lb 7.8 oz)   01/10/25 79.1 kg (174 lb 6.1 oz)       All other nutrition questions/concerns addressed as appropriate. Will continue to monitor prn throughout treatment.     Abbie Herring, ALEXUS, LDN  02/26/2025  2:26 PM

## 2025-02-26 NOTE — PLAN OF CARE
Problem: Adult Inpatient Plan of Care  Goal: Plan of Care Review  Outcome: Progressing  Flowsheets (Taken 2/26/2025 1350)  Plan of Care Reviewed With: patient  Goal: Patient-Specific Goal (Individualized)  Outcome: Progressing  Flowsheets (Taken 2/26/2025 1350)  Individualized Care Needs: recliner, blanklet, pillow  Anxieties, Fears or Concerns: none  Patient/Family-Specific Goals (Include Timeframe): no change in status     Problem: Fatigue  Goal: Improved Activity Tolerance  Outcome: Progressing  Intervention: Promote Improved Energy  Flowsheets (Taken 2/26/2025 1255)  Fatigue Management: paced activity encouraged  Sleep/Rest Enhancement:   noise level reduced   relaxation techniques promoted  Activity Management:   Ambulated -L4   Ambulated in chandler - L4   Up in chair - L3  Environmental Support: calm environment promoted     Pt tolerated treatment. VSS, NAD noted, pt aware of next appt. Pt d/c home

## 2025-02-27 ENCOUNTER — PATIENT MESSAGE (OUTPATIENT)
Dept: HEMATOLOGY/ONCOLOGY | Facility: CLINIC | Age: 85
End: 2025-02-27
Payer: MEDICARE

## 2025-02-28 ENCOUNTER — TELEPHONE (OUTPATIENT)
Dept: HEPATOLOGY | Facility: CLINIC | Age: 85
End: 2025-02-28
Payer: OTHER GOVERNMENT

## 2025-02-28 NOTE — TELEPHONE ENCOUNTER
"Patient call returned. Patient is asking if his appointment with Dr. Boss in April had been approved by [facility]. I told the patient there is no "not authorized" flag on the appointment , but that I will send a message to pre-service to confirm the appointment has been authorized. Patient voiced agreement and understanding.    Maile Garrett MA    ----- Message from Vin sent at 2/28/2025 11:11 AM CST -----  Regarding: call back  Consult/Advisory:  Name Of Caller: Self Contact Preference?:[phone] What is the nature of the call?: Calling to speak w/ someone in regards to some question he has on his appt requesting call back  Additional Notes:"Thank you for all that you do for our patients"  "

## 2025-03-10 ENCOUNTER — OFFICE VISIT (OUTPATIENT)
Dept: HEMATOLOGY/ONCOLOGY | Facility: CLINIC | Age: 85
End: 2025-03-10
Payer: MEDICARE

## 2025-03-10 ENCOUNTER — LAB VISIT (OUTPATIENT)
Dept: LAB | Facility: HOSPITAL | Age: 85
End: 2025-03-10
Attending: INTERNAL MEDICINE
Payer: MEDICARE

## 2025-03-10 DIAGNOSIS — T45.1X5A CHEMOTHERAPY-INDUCED NAUSEA: ICD-10-CM

## 2025-03-10 DIAGNOSIS — C78.02 MALIGNANT NEOPLASM METASTATIC TO BOTH LUNGS: ICD-10-CM

## 2025-03-10 DIAGNOSIS — D84.821 IMMUNODEFICIENCY DUE TO CHEMOTHERAPY: ICD-10-CM

## 2025-03-10 DIAGNOSIS — Z79.899 IMMUNODEFICIENCY DUE TO CHEMOTHERAPY: ICD-10-CM

## 2025-03-10 DIAGNOSIS — C25.1 MALIGNANT NEOPLASM OF BODY OF PANCREAS: Primary | ICD-10-CM

## 2025-03-10 DIAGNOSIS — C25.1 MALIGNANT NEOPLASM OF BODY OF PANCREAS: ICD-10-CM

## 2025-03-10 DIAGNOSIS — C78.01 MALIGNANT NEOPLASM METASTATIC TO BOTH LUNGS: ICD-10-CM

## 2025-03-10 DIAGNOSIS — T45.1X5A IMMUNODEFICIENCY DUE TO CHEMOTHERAPY: ICD-10-CM

## 2025-03-10 DIAGNOSIS — R11.0 CHEMOTHERAPY-INDUCED NAUSEA: ICD-10-CM

## 2025-03-10 DIAGNOSIS — K51.00 ULCERATIVE PANCOLITIS WITHOUT COMPLICATION: ICD-10-CM

## 2025-03-10 LAB
BASOPHILS # BLD AUTO: 0.06 K/UL (ref 0–0.2)
BASOPHILS NFR BLD: 0.4 % (ref 0–1.9)
DIFFERENTIAL METHOD BLD: ABNORMAL
EOSINOPHIL # BLD AUTO: 0.1 K/UL (ref 0–0.5)
EOSINOPHIL NFR BLD: 0.8 % (ref 0–8)
ERYTHROCYTE [DISTWIDTH] IN BLOOD BY AUTOMATED COUNT: 19.5 % (ref 11.5–14.5)
HCT VFR BLD AUTO: 29.7 % (ref 40–54)
HGB BLD-MCNC: 9.5 G/DL (ref 14–18)
IMM GRANULOCYTES # BLD AUTO: 0.45 K/UL (ref 0–0.04)
IMM GRANULOCYTES NFR BLD AUTO: 2.8 % (ref 0–0.5)
LYMPHOCYTES # BLD AUTO: 3.2 K/UL (ref 1–4.8)
LYMPHOCYTES NFR BLD: 20.3 % (ref 18–48)
MCH RBC QN AUTO: 30.4 PG (ref 27–31)
MCHC RBC AUTO-ENTMCNC: 32 G/DL (ref 32–36)
MCV RBC AUTO: 95 FL (ref 82–98)
MONOCYTES # BLD AUTO: 1.3 K/UL (ref 0.3–1)
MONOCYTES NFR BLD: 8.1 % (ref 4–15)
NEUTROPHILS # BLD AUTO: 10.7 K/UL (ref 1.8–7.7)
NEUTROPHILS NFR BLD: 67.6 % (ref 38–73)
NRBC BLD-RTO: 0 /100 WBC
PLATELET # BLD AUTO: 292 K/UL (ref 150–450)
PMV BLD AUTO: 9.6 FL (ref 9.2–12.9)
RBC # BLD AUTO: 3.12 M/UL (ref 4.6–6.2)
WBC # BLD AUTO: 15.85 K/UL (ref 3.9–12.7)

## 2025-03-10 PROCEDURE — 98007 SYNCH AUDIO-VIDEO EST HI 40: CPT | Mod: 95,,, | Performed by: INTERNAL MEDICINE

## 2025-03-10 PROCEDURE — G2211 COMPLEX E/M VISIT ADD ON: HCPCS | Mod: 95,,, | Performed by: INTERNAL MEDICINE

## 2025-03-10 PROCEDURE — 80053 COMPREHEN METABOLIC PANEL: CPT | Performed by: INTERNAL MEDICINE

## 2025-03-10 PROCEDURE — 36415 COLL VENOUS BLD VENIPUNCTURE: CPT | Mod: PO | Performed by: INTERNAL MEDICINE

## 2025-03-10 PROCEDURE — 85025 COMPLETE CBC W/AUTO DIFF WBC: CPT | Mod: PO | Performed by: INTERNAL MEDICINE

## 2025-03-10 RX ORDER — ONDANSETRON HYDROCHLORIDE 8 MG/1
8 TABLET, FILM COATED ORAL EVERY 6 HOURS PRN
Qty: 30 TABLET | Refills: 2 | Status: SHIPPED | OUTPATIENT
Start: 2025-03-10 | End: 2026-03-10

## 2025-03-10 NOTE — PROGRESS NOTES
Subjective     Patient ID: Alfie Olson is a 84 y.o. male.  The patient location is: home  The chief complaint leading to consultation is:  Pancreatic cancer for palliative chemotherapy    Visit type: audiovisual      Notes:    Chief Complaint: Malignant neoplasm of body of pancreas  Mr. Olson underwent a CT of the abdomen 11/22/2024 for ulcerative colitis follow-up, which incidentally revealed new findings in the lower portion of the lungs. 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 CT of the chest from 2019 shows pleural-based nodules with calcifications consistent with asbestos-related pleural plaques.     Mr. Olson has ulcerative colitis managed with Entyvio infusions at the clinic. He also reports prior diagnoses of COPD and asthma, controlled with an inhaler. His cardiac history includes one stent placed 2-3 years ago, for which he takes Plavix. [facility]     CT chest on 11/27/2024: Interval development of numerous spiculated pulmonary nodules and masses scattered throughout the bilateral lungs.  Findings are highly concerning for malignancy/metastatic disease, however differential also includes septic emboli.  Recommend correlation with tissue sampling.  2. Redemonstration of scattered bilateral calcified and noncalcified pleural plaques.  3. Mild bilateral peripheral predominant interstitial reticulation and ground-glass density suggestive for interstitial lung disease.  4. Trace left pleural fluid.  5. Several hypodense lesions in the pancreatic body/tail which could represent cysts or cystic neoplasm.  Recommend further evaluation with contrast enhanced MRI MRCP pancreatic mass protocol.     PET scan on 11/27/2024: . Innumerable pleural and parenchymal bilateral lung nodules with FDG uptake suggestive of metastatic disease or mesothelioma.  2. Nodule in the  "left axilla subcutaneous tissues with low level uptake is indeterminate.  Direct visualization warranted.  3. Partially calcified mildly enlarged mediastinal lymph nodes with low level uptake are favored to be inflammatory.     MRI/MRCP on 12/11/2024: Probable pancreatic pseudocyst or side duct ectasia in the pancreatic tail.  No suspicious pancreatic lesions.     He underwent biopsy on 12/12/2024. Pathology reveals LINGULA, BIOPSY:--POSITIVE FOR ADENOCARCINOMA.      2. LUNG, RIGHT MIDDLE LOBE NODULE, BIOPSY:--POSITIVE FOR ADENOCARCINOMA.       Comment: The morphologic and immunophenotypic features are not entirely specific    but are most suggestive of pancreaticobiliary primary. Immunohistochemistry was performed with appropriate controls on block 1A based on the histopathologic findings and the results are summarized as  follows:     CK19: Positive. : Positive      He went to the ED on 12/29 with hemoptysis. CTA chest on 12/29/24 revealed "A pulmonary embolus is not demonstrated. Multiple densities bilaterally with a differential of pneumonia and metastatic disease. Small left-sided pleural effusion"  He completed oral antibiotics and his hemoptysis resolved      He started palliative chemo with Gemzar and Abraxane on 01/14/2025    He was hospitalized 01/18/2025 for fever of 100.4, no etiology was found for the infection and he was discharged without any antibiotics  He received cycle 1 day 15 of chemotherapy on 01/29/2025         HPIHe comes in today for cycle 3 day 1 of palliative chemotherapy with Gemzar and Abraxane    He noted nausea after his last chemo which lasted the whole 2 weeks. Was bad for the first 5 days and then improved but still present   He is taking compazine which helps but does not get rid of the nausea completely    Review of Systems   Constitutional:  Negative for appetite change, fatigue and unexpected weight change.   HENT:  Negative for mouth sores.    Eyes:  Negative for visual " disturbance.   Respiratory:  Negative for cough and shortness of breath.    Cardiovascular:  Negative for chest pain.   Gastrointestinal:  Negative for abdominal pain and diarrhea.   Genitourinary:  Negative for frequency.   Musculoskeletal:  Negative for back pain.   Integumentary:  Negative for rash.   Neurological:  Negative for headaches.   Hematological:  Negative for adenopathy.   Psychiatric/Behavioral:  The patient is not nervous/anxious.    All other systems reviewed and are negative.         Objective     Physical Exam     LABS:  WBC   Date Value Ref Range Status   03/10/2025 15.85 (H) 3.90 - 12.70 K/uL Final     Hemoglobin   Date Value Ref Range Status   03/10/2025 9.5 (L) 14.0 - 18.0 g/dL Final     Hematocrit   Date Value Ref Range Status   03/10/2025 29.7 (L) 40.0 - 54.0 % Final     Platelets   Date Value Ref Range Status   03/10/2025 292 150 - 450 K/uL Final     Gran # (ANC)   Date Value Ref Range Status   03/10/2025 10.7 (H) 1.8 - 7.7 K/uL Final     Gran %   Date Value Ref Range Status   03/10/2025 67.6 38.0 - 73.0 % Final       Chemistry        Component Value Date/Time     03/10/2025 0100    K 5.1 03/10/2025 0100     03/10/2025 0100    CO2 26 03/10/2025 0100    BUN 14 03/10/2025 0100    CREATININE 0.9 03/10/2025 0100    GLU 89 03/10/2025 0100        Component Value Date/Time    CALCIUM 8.8 03/10/2025 0100    ALKPHOS 147 03/10/2025 0100     (H) 03/10/2025 0100    ALT 51 (H) 03/10/2025 0100    BILITOT 0.5 03/10/2025 0100          Assessment and Plan     1. Malignant neoplasm of body of pancreas  -     CBC w/ DIFF; Future; Expected date: 03/11/2025  -     CMP; Future; Expected date: 03/11/2025  -     CA19.9; Future; Expected date: 03/11/2025  -     CT Chest Abdomen Pelvis With IV Contrast (XPD) Routine Oral Contrast; Future; Expected date: 03/11/2025    2. Chemotherapy-induced nausea  -     ondansetron (ZOFRAN) 8 MG tablet; Take 1 tablet (8 mg total) by mouth every 6 (six) hours as  needed.  Dispense: 30 tablet; Refill: 2    3. Immunodeficiency due to chemotherapy    4. Malignant neoplasm metastatic to both lungs    5. Ulcerative pancolitis without complication    Other orders  -     pegfilgrastim (NEULASTA (ON BODY INJECTOR)) injection 6 mg  -     ondansetron injection 8 mg  -     PACLitaxeL protein-bound (ABRAXANE) 125 mg/m2 = 240 mg in 48 mL infusion  -     gemcitabine (GEMZAR) 1,950 mg in 0.9% NaCl 269.5 mL chemo infusion  -     prochlorperazine injection 5 mg  -     EPINEPHrine (EPIPEN) 0.3 mg/0.3 mL pen injection 0.3 mg  -     diphenhydrAMINE injection 50 mg  -     hydrocortisone sodium succinate injection 100 mg  -     0.9% NaCl 250 mL flush bag  -     sodium chloride 0.9% flush 10 mL  -     heparin, porcine (PF) 100 unit/mL injection flush 500 Units  -     alteplase injection 2 mg        Route Chart for Scheduling    Med Onc Chart Routing      Follow up with physician . Leonid patient, on on 4/7 schedule CBC CMP, CEA 19-9, CT chest abdomen pelvis. On 4/8 schedule to see me, on 04/09 schedule Gemzar and Abraxane   Follow up with ROMANA . Leonid patient.  Leave chemo as is on 03/12/2025/ on 03/25 schedule CBC CMP and see ROMANA, on 03/26 schedule Gemzar and Abraxane   Infusion scheduling note    Injection scheduling note    Labs    Imaging    Pharmacy appointment    Other referrals                  Treatment Plan Information   [facility] modified nab-PACLitaxel + gemcitabine Q4W Lacy Dunne MD   Associated diagnosis: Malignant neoplasm of body of pancreas Stage IV cT1c, cN0, cM1 noted on 12/26/2024   Line of treatment: First Line  Treatment Goal: Palliative     Upcoming Treatment Dates - [facility] GI modified nab-PACLitaxel + gemcitabine Q4W    3/12/2025       Chemotherapy       PACLitaxeL protein-bound (ABRAXANE) 125 mg/m2 = 240 mg in 48 mL infusion       gemcitabine (GEMZAR) 1,950 mg in 0.9% NaCl 269.5 mL chemo infusion       Antiemetics       ondansetron injection 8 mg  3/26/2025        Chemotherapy       PACLitaxeL protein-bound (ABRAXANE) 125 mg/m2 = 240 mg in 48 mL infusion       gemcitabine (GEMZAR) 1,950 mg in 0.9% NaCl 269.5 mL chemo infusion       Antiemetics       ondansetron injection 8 mg  4/9/2025       Chemotherapy       PACLitaxeL protein-bound (ABRAXANE) 125 mg/m2 = 240 mg in 48 mL infusion       gemcitabine (GEMZAR) 1,950 mg in 0.9% NaCl 269.5 mL chemo infusion       Antiemetics       ondansetron injection 8 mg  4/23/2025       Chemotherapy       PACLitaxeL protein-bound (ABRAXANE) 125 mg/m2 = 240 mg in 48 mL infusion       gemcitabine (GEMZAR) 1,950 mg in 0.9% NaCl 269.5 mL chemo infusion       Antiemetics       ondansetron injection 8 mg    Therapy Plan Information  ENTYVIO GI for Ulcerative colitis, noted on 10/20/2022  Medications  vedolizumab (ENTYVIO) 300 mg/250 mL NS IVPB  300 mg, Intravenous, Every 4 weeks  PRN Medications  diphenhydrAMINE injection 25 mg  25 mg, Intravenous, PRN  acetaminophen tablet 650 mg  650 mg, Oral, PRN  albuterol-ipratropium 2.5 mg-0.5 mg/3 mL nebulizer solution 3 mL  3 mL, Nebulization, PRN  methylPREDNISolone sodium succinate injection 40 mg  40 mg, Intravenous, PRN  EPINEPHrine (PF) injection 0.3 mg  0.3 mg, Subcutaneous, PRN  Flushes  0.9% NaCl 250 mL flush bag  Intravenous, Every visit  sodium chloride 0.9% flush 10 mL  10 mL, Intravenous, Every visit  heparin, porcine (PF) 100 unit/mL injection flush 500 Units  500 Units, Intravenous, Every visit  alteplase injection 2 mg  2 mg, Intra-Catheter, Every visit      No therapy plan of the specified type found.    No therapy plan of the specified type found.         Mr. Olson comes in today for palliative chemotherapy with Gemzar and Abraxane.  He seems to be tolerating his treatment reasonably well and will proceed as scheduled and return in 2 weeks for next cycle.   Restaging scans will be early April 2025    Ulcerative colitis:  He will continue with Entyvio    Visit today included increased  complexity associated with the care of the episodic problem palliative chemotherapy addressed and managing the longitudinal care of the patient due to the serious and/or complex managed problem(s) pancreatic cancer     Above care plan was discussed with patient and all questions were addressed tto his  satisfaction

## 2025-03-11 LAB
ALBUMIN SERPL BCP-MCNC: 3 G/DL (ref 3.5–5.2)
ALP SERPL-CCNC: 147 U/L (ref 40–150)
ALT SERPL W/O P-5'-P-CCNC: 51 U/L (ref 10–44)
ANION GAP SERPL CALC-SCNC: 7 MMOL/L (ref 8–16)
AST SERPL-CCNC: 100 U/L (ref 10–40)
BILIRUB SERPL-MCNC: 0.5 MG/DL (ref 0.1–1)
BUN SERPL-MCNC: 14 MG/DL (ref 8–23)
CALCIUM SERPL-MCNC: 8.8 MG/DL (ref 8.7–10.5)
CHLORIDE SERPL-SCNC: 104 MMOL/L (ref 95–110)
CO2 SERPL-SCNC: 26 MMOL/L (ref 23–29)
CREAT SERPL-MCNC: 0.9 MG/DL (ref 0.5–1.4)
EST. GFR  (NO RACE VARIABLE): >60 ML/MIN/1.73 M^2
GLUCOSE SERPL-MCNC: 89 MG/DL (ref 70–110)
POTASSIUM SERPL-SCNC: 5.1 MMOL/L (ref 3.5–5.1)
PROT SERPL-MCNC: 6.5 G/DL (ref 6–8.4)
SODIUM SERPL-SCNC: 137 MMOL/L (ref 136–145)

## 2025-03-11 RX ORDER — PROCHLORPERAZINE EDISYLATE 5 MG/ML
5 INJECTION INTRAMUSCULAR; INTRAVENOUS ONCE AS NEEDED
Status: CANCELLED | OUTPATIENT
Start: 2025-03-12

## 2025-03-11 RX ORDER — ONDANSETRON HYDROCHLORIDE 2 MG/ML
8 INJECTION, SOLUTION INTRAVENOUS
Status: CANCELLED | OUTPATIENT
Start: 2025-03-12

## 2025-03-11 RX ORDER — DIPHENHYDRAMINE HYDROCHLORIDE 50 MG/ML
50 INJECTION, SOLUTION INTRAMUSCULAR; INTRAVENOUS ONCE AS NEEDED
Status: CANCELLED | OUTPATIENT
Start: 2025-03-12

## 2025-03-11 RX ORDER — HEPARIN 100 UNIT/ML
500 SYRINGE INTRAVENOUS
Status: CANCELLED | OUTPATIENT
Start: 2025-03-12

## 2025-03-11 RX ORDER — EPINEPHRINE 0.3 MG/.3ML
0.3 INJECTION SUBCUTANEOUS ONCE AS NEEDED
Status: CANCELLED | OUTPATIENT
Start: 2025-03-12

## 2025-03-11 RX ORDER — SODIUM CHLORIDE 0.9 % (FLUSH) 0.9 %
10 SYRINGE (ML) INJECTION
Status: CANCELLED | OUTPATIENT
Start: 2025-03-12

## 2025-03-12 ENCOUNTER — INFUSION (OUTPATIENT)
Dept: INFUSION THERAPY | Facility: HOSPITAL | Age: 85
End: 2025-03-12
Attending: INTERNAL MEDICINE
Payer: MEDICARE

## 2025-03-12 VITALS
OXYGEN SATURATION: 94 % | DIASTOLIC BLOOD PRESSURE: 67 MMHG | BODY MASS INDEX: 26.49 KG/M2 | SYSTOLIC BLOOD PRESSURE: 115 MMHG | RESPIRATION RATE: 16 BRPM | TEMPERATURE: 98 F | HEIGHT: 68 IN | HEART RATE: 84 BPM | WEIGHT: 174.81 LBS

## 2025-03-12 DIAGNOSIS — C25.1 MALIGNANT NEOPLASM OF BODY OF PANCREAS: Primary | ICD-10-CM

## 2025-03-12 PROCEDURE — 25000003 PHARM REV CODE 250: Mod: PN | Performed by: INTERNAL MEDICINE

## 2025-03-12 PROCEDURE — 96375 TX/PRO/DX INJ NEW DRUG ADDON: CPT | Mod: PN

## 2025-03-12 PROCEDURE — 96417 CHEMO IV INFUS EACH ADDL SEQ: CPT | Mod: PN

## 2025-03-12 PROCEDURE — 63600175 PHARM REV CODE 636 W HCPCS: Mod: JZ,TB,PN | Performed by: INTERNAL MEDICINE

## 2025-03-12 PROCEDURE — 96413 CHEMO IV INFUSION 1 HR: CPT | Mod: PN

## 2025-03-12 PROCEDURE — 96377 APPLICATON ON-BODY INJECTOR: CPT | Mod: PN

## 2025-03-12 RX ORDER — SODIUM CHLORIDE 0.9 % (FLUSH) 0.9 %
10 SYRINGE (ML) INJECTION
Status: DISCONTINUED | OUTPATIENT
Start: 2025-03-12 | End: 2025-03-12 | Stop reason: HOSPADM

## 2025-03-12 RX ORDER — HEPARIN 100 UNIT/ML
500 SYRINGE INTRAVENOUS
Status: DISCONTINUED | OUTPATIENT
Start: 2025-03-12 | End: 2025-03-12 | Stop reason: HOSPADM

## 2025-03-12 RX ORDER — PROCHLORPERAZINE EDISYLATE 5 MG/ML
5 INJECTION INTRAMUSCULAR; INTRAVENOUS ONCE AS NEEDED
Status: DISCONTINUED | OUTPATIENT
Start: 2025-03-12 | End: 2025-03-12 | Stop reason: HOSPADM

## 2025-03-12 RX ORDER — ONDANSETRON HYDROCHLORIDE 2 MG/ML
8 INJECTION, SOLUTION INTRAVENOUS
Status: COMPLETED | OUTPATIENT
Start: 2025-03-12 | End: 2025-03-12

## 2025-03-12 RX ORDER — DIPHENHYDRAMINE HYDROCHLORIDE 50 MG/ML
50 INJECTION, SOLUTION INTRAMUSCULAR; INTRAVENOUS ONCE AS NEEDED
Status: DISCONTINUED | OUTPATIENT
Start: 2025-03-12 | End: 2025-03-12 | Stop reason: HOSPADM

## 2025-03-12 RX ORDER — EPINEPHRINE 0.3 MG/.3ML
0.3 INJECTION SUBCUTANEOUS ONCE AS NEEDED
Status: DISCONTINUED | OUTPATIENT
Start: 2025-03-12 | End: 2025-03-12 | Stop reason: HOSPADM

## 2025-03-12 RX ADMIN — ONDANSETRON 8 MG: 2 INJECTION INTRAMUSCULAR; INTRAVENOUS at 01:03

## 2025-03-12 RX ADMIN — PACLITAXEL 240 MG: 100 INJECTION, POWDER, LYOPHILIZED, FOR SUSPENSION INTRAVENOUS at 02:03

## 2025-03-12 RX ADMIN — GEMCITABINE 2000 MG: 38 INJECTION, SOLUTION INTRAVENOUS at 02:03

## 2025-03-12 RX ADMIN — PEGFILGRASTIM 6 MG: KIT SUBCUTANEOUS at 03:03

## 2025-03-12 NOTE — PLAN OF CARE
Problem: Adult Inpatient Plan of Care  Goal: Plan of Care Review  Outcome: Progressing  Flowsheets (Taken 3/12/2025 1522)  Plan of Care Reviewed With: patient  Goal: Patient-Specific Goal (Individualized)  Outcome: Progressing  Flowsheets (Taken 3/12/2025 1522)  Individualized Care Needs: recliner/warm blanket/education  Anxieties, Fears or Concerns: none  Goal: Absence of Hospital-Acquired Illness or Injury  Outcome: Progressing  Goal: Optimal Comfort and Wellbeing  Outcome: Progressing  Goal: Readiness for Transition of Care  Outcome: Progressing     Problem: Fatigue  Goal: Improved Activity Tolerance  Outcome: Progressing  Intervention: Promote Improved Energy  Flowsheets (Taken 3/12/2025 1522)  Fatigue Management:   activity schedule adjusted   fatigue-related activity identified   frequent rest breaks encouraged   paced activity encouraged  Sleep/Rest Enhancement:   noise level reduced   reading promoted   regular sleep/rest pattern promoted   relaxation techniques promoted   room darkened   therapeutic touch utilized  Activity Management:   Ambulated -L4   Ambulated to bathroom - L4   Ambulated in chandler - L4   Up in stretcher chair - L1  Environmental Support: calm environment promoted   Pt tolerated Abraxane/Gemzar well today. NAD/no concerns voiced. Reviewed follow-up appointments. All questions were answered, ambulated independently at d/c.

## 2025-03-13 ENCOUNTER — INFUSION (OUTPATIENT)
Dept: INFUSION THERAPY | Facility: HOSPITAL | Age: 85
End: 2025-03-13
Attending: INTERNAL MEDICINE
Payer: MEDICARE

## 2025-03-13 VITALS
DIASTOLIC BLOOD PRESSURE: 60 MMHG | RESPIRATION RATE: 15 BRPM | WEIGHT: 174.81 LBS | TEMPERATURE: 99 F | SYSTOLIC BLOOD PRESSURE: 132 MMHG | BODY MASS INDEX: 26.49 KG/M2 | HEIGHT: 68 IN | OXYGEN SATURATION: 95 % | HEART RATE: 98 BPM

## 2025-03-13 DIAGNOSIS — K51.018 ULCERATIVE PANCOLITIS WITH OTHER COMPLICATION: Primary | ICD-10-CM

## 2025-03-13 PROCEDURE — 25000003 PHARM REV CODE 250: Mod: PN | Performed by: INTERNAL MEDICINE

## 2025-03-13 PROCEDURE — 96365 THER/PROPH/DIAG IV INF INIT: CPT | Mod: PN

## 2025-03-13 PROCEDURE — 63600175 PHARM REV CODE 636 W HCPCS: Mod: JZ,TB,PN | Performed by: INTERNAL MEDICINE

## 2025-03-13 RX ORDER — EPINEPHRINE 1 MG/ML
0.3 INJECTION, SOLUTION, CONCENTRATE INTRAVENOUS
OUTPATIENT
Start: 2025-04-10

## 2025-03-13 RX ORDER — HEPARIN 100 UNIT/ML
500 SYRINGE INTRAVENOUS
OUTPATIENT
Start: 2025-04-10

## 2025-03-13 RX ORDER — METHYLPREDNISOLONE SOD SUCC 125 MG
40 VIAL (EA) INJECTION
OUTPATIENT
Start: 2025-04-10

## 2025-03-13 RX ORDER — SODIUM CHLORIDE 0.9 % (FLUSH) 0.9 %
10 SYRINGE (ML) INJECTION
Status: DISCONTINUED | OUTPATIENT
Start: 2025-03-13 | End: 2025-03-13 | Stop reason: HOSPADM

## 2025-03-13 RX ORDER — ACETAMINOPHEN 325 MG/1
650 TABLET ORAL
OUTPATIENT
Start: 2025-04-10

## 2025-03-13 RX ORDER — DIPHENHYDRAMINE HYDROCHLORIDE 50 MG/ML
25 INJECTION, SOLUTION INTRAMUSCULAR; INTRAVENOUS
OUTPATIENT
Start: 2025-04-10

## 2025-03-13 RX ORDER — IPRATROPIUM BROMIDE AND ALBUTEROL SULFATE 2.5; .5 MG/3ML; MG/3ML
3 SOLUTION RESPIRATORY (INHALATION)
OUTPATIENT
Start: 2025-04-10

## 2025-03-13 RX ORDER — SODIUM CHLORIDE 0.9 % (FLUSH) 0.9 %
10 SYRINGE (ML) INJECTION
OUTPATIENT
Start: 2025-04-10

## 2025-03-13 RX ADMIN — SODIUM CHLORIDE: 9 INJECTION, SOLUTION INTRAVENOUS at 10:03

## 2025-03-13 RX ADMIN — VEDOLIZUMAB 300 MG: 300 INJECTION, POWDER, LYOPHILIZED, FOR SOLUTION INTRAVENOUS at 10:03

## 2025-03-13 NOTE — PLAN OF CARE
Problem: Adult Inpatient Plan of Care  Goal: Plan of Care Review  3/13/2025 1146 by Lupe Starkey RN  Outcome: Progressing  3/13/2025 1100 by Lupe Starkey RN  Outcome: Progressing  Goal: Patient-Specific Goal (Individualized)  3/13/2025 1146 by Lupe Starkey RN  Outcome: Progressing  3/13/2025 1100 by Lupe Starkey RN  Outcome: Progressing  Goal: Absence of Hospital-Acquired Illness or Injury  3/13/2025 1146 by Lupe Starkey RN  Outcome: Progressing  3/13/2025 1100 by Lupe Starkey RN  Outcome: Progressing  Goal: Optimal Comfort and Wellbeing  3/13/2025 1146 by Lupe Starkey RN  Outcome: Progressing  3/13/2025 1100 by Lupe Starkey RN  Outcome: Progressing  Intervention: Monitor Pain and Promote Comfort  3/13/2025 1146 by Lupe Starkey RN  Flowsheets (Taken 3/13/2025 1146)  Pain Management Interventions: relaxation techniques promoted  3/13/2025 1100 by Lupe Starkey RN  Flowsheets (Taken 3/13/2025 1100)  Pain Management Interventions: warm blanket provided  Goal: Readiness for Transition of Care  3/13/2025 1146 by Lupe Starkey RN  Outcome: Progressing  3/13/2025 1100 by Lupe Starkey RN  Outcome: Progressing     Problem: Fatigue  Goal: Improved Activity Tolerance  3/13/2025 1146 by Lupe Starkey RN  Outcome: Progressing  3/13/2025 1100 by Lupe Starkey RN  Outcome: Progressing  Intervention: Promote Improved Energy  3/13/2025 1146 by Lupe Starkey RN  Flowsheets (Taken 3/13/2025 1146)  Activity Management: Ambulated -L4  Environmental Support: rest periods encouraged  3/13/2025 1100 by Lupe Starkey RN  Flowsheets (Taken 3/13/2025 1100)  Activity Management: Ambulated -L4  Environmental Support: rest periods encouraged

## 2025-03-13 NOTE — PLAN OF CARE
Problem: Adult Inpatient Plan of Care  Goal: Plan of Care Review  Outcome: Progressing  Goal: Patient-Specific Goal (Individualized)  Outcome: Progressing  Goal: Absence of Hospital-Acquired Illness or Injury  Outcome: Progressing  Goal: Optimal Comfort and Wellbeing  Outcome: Progressing  Intervention: Monitor Pain and Promote Comfort  Flowsheets (Taken 3/13/2025 1100)  Pain Management Interventions: warm blanket provided  Goal: Readiness for Transition of Care  Outcome: Progressing     Problem: Fatigue  Goal: Improved Activity Tolerance  Outcome: Progressing  Intervention: Promote Improved Energy  Flowsheets (Taken 3/13/2025 1100)  Activity Management: Ambulated -L4  Environmental Support: rest periods encouraged

## 2025-03-17 PROBLEM — D72.820 LYMPHOCYTOSIS: Status: ACTIVE | Noted: 2025-03-17

## 2025-03-17 PROBLEM — L97.509 TOE ULCER: Status: ACTIVE | Noted: 2025-03-17

## 2025-03-17 PROBLEM — D72.820 LYMPHOCYTOSIS: Status: RESOLVED | Noted: 2025-03-17 | Resolved: 2025-03-17

## 2025-03-17 PROBLEM — Z79.899 ON ANTINEOPLASTIC CHEMOTHERAPY: Status: ACTIVE | Noted: 2025-03-17

## 2025-03-17 PROBLEM — D72.825 BANDEMIA: Status: ACTIVE | Noted: 2025-03-17

## 2025-03-17 PROBLEM — L03.90 CELLULITIS: Status: ACTIVE | Noted: 2025-03-17

## 2025-03-17 PROBLEM — R06.09 DOE (DYSPNEA ON EXERTION): Status: ACTIVE | Noted: 2025-03-17

## 2025-03-17 PROBLEM — L03.116 LEFT LEG CELLULITIS: Status: ACTIVE | Noted: 2025-03-17

## 2025-03-17 PROBLEM — N17.9 AKI (ACUTE KIDNEY INJURY): Status: ACTIVE | Noted: 2025-03-17

## 2025-03-17 PROBLEM — L97.529: Status: ACTIVE | Noted: 2025-03-17

## 2025-03-17 PROBLEM — I50.89 OTHER HEART FAILURE: Status: ACTIVE | Noted: 2025-03-17

## 2025-03-17 PROBLEM — D72.825 BANDEMIA: Status: RESOLVED | Noted: 2025-03-17 | Resolved: 2025-03-17

## 2025-03-17 PROBLEM — Z79.69 ON ANTINEOPLASTIC CHEMOTHERAPY: Status: ACTIVE | Noted: 2025-03-17

## 2025-03-17 PROBLEM — L03.115 CELLULITIS OF RIGHT LEG: Status: ACTIVE | Noted: 2025-03-17

## 2025-03-17 PROBLEM — R60.0 BILATERAL LOWER EXTREMITY EDEMA: Status: ACTIVE | Noted: 2025-03-17

## 2025-03-18 PROBLEM — D64.9 NORMOCYTIC ANEMIA: Status: ACTIVE | Noted: 2025-03-18

## 2025-03-18 PROBLEM — L03.119 CELLULITIS OF FOOT: Status: ACTIVE | Noted: 2025-03-18

## 2025-03-25 ENCOUNTER — LAB VISIT (OUTPATIENT)
Dept: LAB | Facility: HOSPITAL | Age: 85
End: 2025-03-25
Attending: NURSE PRACTITIONER
Payer: MEDICARE

## 2025-03-25 DIAGNOSIS — C25.1 MALIGNANT NEOPLASM OF BODY OF PANCREAS: ICD-10-CM

## 2025-03-25 LAB
ABSOLUTE NEUTROPHIL MANUAL (OHS): 10.5 K/UL
ALBUMIN SERPL BCP-MCNC: 3 G/DL (ref 3.5–5.2)
ALP SERPL-CCNC: 149 UNIT/L (ref 40–150)
ALT SERPL W/O P-5'-P-CCNC: 52 UNIT/L (ref 10–44)
ANION GAP (OHS): 8 MMOL/L (ref 8–16)
ANISOCYTOSIS BLD QL SMEAR: SLIGHT
AST SERPL-CCNC: 90 UNIT/L (ref 11–45)
BILIRUB SERPL-MCNC: 0.6 MG/DL (ref 0.1–1)
BUN SERPL-MCNC: 16 MG/DL (ref 8–23)
CALCIUM SERPL-MCNC: 8.7 MG/DL (ref 8.7–10.5)
CHLORIDE SERPL-SCNC: 107 MMOL/L (ref 95–110)
CO2 SERPL-SCNC: 22 MMOL/L (ref 23–29)
CREAT SERPL-MCNC: 1.2 MG/DL (ref 0.5–1.4)
ERYTHROCYTE [DISTWIDTH] IN BLOOD BY AUTOMATED COUNT: 21.6 % (ref 11.5–14.5)
GFR SERPLBLD CREATININE-BSD FMLA CKD-EPI: 60 ML/MIN/1.73/M2
GLUCOSE SERPL-MCNC: 111 MG/DL (ref 70–110)
HCT VFR BLD AUTO: 27.6 % (ref 40–54)
HGB BLD-MCNC: 9.1 GM/DL (ref 14–18)
LYMPHOCYTES NFR BLD MANUAL: 22 % (ref 18–48)
MCH RBC QN AUTO: 32 PG (ref 27–50)
MCHC RBC AUTO-ENTMCNC: 33 G/DL (ref 32–36)
MCV RBC AUTO: 97 FL (ref 82–98)
MONOCYTES NFR BLD MANUAL: 6 % (ref 4–15)
NEUTROPHILS NFR BLD MANUAL: 69 % (ref 38–73)
NEUTS BAND NFR BLD MANUAL: 3 %
NUCLEATED RBC (/100WBC) (OHS): 0 /100 WBC
OVALOCYTES BLD QL SMEAR: NORMAL
PLATELET # BLD AUTO: 306 K/UL (ref 150–450)
PLATELET BLD QL SMEAR: NORMAL
PMV BLD AUTO: 9.5 FL (ref 9.2–12.9)
POIKILOCYTOSIS BLD QL SMEAR: SLIGHT
POLYCHROMASIA BLD QL SMEAR: NORMAL
POTASSIUM SERPL-SCNC: 4.9 MMOL/L (ref 3.5–5.1)
PROT SERPL-MCNC: 6.1 GM/DL (ref 6–8.4)
RBC # BLD AUTO: 2.84 M/UL (ref 4.6–6.2)
SODIUM SERPL-SCNC: 137 MMOL/L (ref 136–145)
SPHEROCYTES BLD QL SMEAR: NORMAL
WBC # BLD AUTO: 14.54 K/UL (ref 3.9–12.7)

## 2025-03-25 PROCEDURE — 82040 ASSAY OF SERUM ALBUMIN: CPT

## 2025-03-25 PROCEDURE — 36415 COLL VENOUS BLD VENIPUNCTURE: CPT | Mod: PO

## 2025-03-25 PROCEDURE — 85025 COMPLETE CBC W/AUTO DIFF WBC: CPT | Mod: PO

## 2025-03-26 ENCOUNTER — OFFICE VISIT (OUTPATIENT)
Dept: HEMATOLOGY/ONCOLOGY | Facility: CLINIC | Age: 85
End: 2025-03-26
Payer: MEDICARE

## 2025-03-26 VITALS
HEART RATE: 91 BPM | OXYGEN SATURATION: 94 % | RESPIRATION RATE: 20 BRPM | DIASTOLIC BLOOD PRESSURE: 73 MMHG | SYSTOLIC BLOOD PRESSURE: 143 MMHG | TEMPERATURE: 98 F | BODY MASS INDEX: 27.12 KG/M2 | WEIGHT: 178.38 LBS

## 2025-03-26 DIAGNOSIS — L03.119 CELLULITIS AND ABSCESS OF FOOT: ICD-10-CM

## 2025-03-26 DIAGNOSIS — T45.1X5A IMMUNODEFICIENCY DUE TO CHEMOTHERAPY: ICD-10-CM

## 2025-03-26 DIAGNOSIS — C25.1 MALIGNANT NEOPLASM OF BODY OF PANCREAS: Primary | ICD-10-CM

## 2025-03-26 DIAGNOSIS — D84.821 IMMUNODEFICIENCY DUE TO CHEMOTHERAPY: ICD-10-CM

## 2025-03-26 DIAGNOSIS — A04.72 CLOSTRIDIUM DIFFICILE COLITIS: ICD-10-CM

## 2025-03-26 DIAGNOSIS — Z79.899 IMMUNODEFICIENCY DUE TO CHEMOTHERAPY: ICD-10-CM

## 2025-03-26 DIAGNOSIS — C78.01 MALIGNANT NEOPLASM METASTATIC TO BOTH LUNGS: ICD-10-CM

## 2025-03-26 DIAGNOSIS — R19.7 DIARRHEA OF PRESUMED INFECTIOUS ORIGIN: ICD-10-CM

## 2025-03-26 DIAGNOSIS — C78.02 MALIGNANT NEOPLASM METASTATIC TO BOTH LUNGS: ICD-10-CM

## 2025-03-26 DIAGNOSIS — L02.619 CELLULITIS AND ABSCESS OF FOOT: ICD-10-CM

## 2025-03-26 PROCEDURE — 99999 PR PBB SHADOW E&M-EST. PATIENT-LVL V: CPT | Mod: PBBFAC,,, | Performed by: NURSE PRACTITIONER

## 2025-03-26 PROCEDURE — 99215 OFFICE O/P EST HI 40 MIN: CPT | Mod: PBBFAC,PN | Performed by: NURSE PRACTITIONER

## 2025-03-26 PROCEDURE — 99215 OFFICE O/P EST HI 40 MIN: CPT | Mod: S$PBB,,, | Performed by: NURSE PRACTITIONER

## 2025-03-26 RX ORDER — SULFAMETHOXAZOLE AND TRIMETHOPRIM 800; 160 MG/1; MG/1
1 TABLET ORAL 2 TIMES DAILY
Qty: 14 TABLET | Refills: 0 | Status: SHIPPED | OUTPATIENT
Start: 2025-03-26 | End: 2025-03-26

## 2025-03-26 RX ORDER — SULFAMETHOXAZOLE AND TRIMETHOPRIM 800; 160 MG/1; MG/1
1 TABLET ORAL 2 TIMES DAILY
Qty: 14 TABLET | Refills: 0 | Status: SHIPPED | OUTPATIENT
Start: 2025-03-26 | End: 2025-04-02

## 2025-03-26 NOTE — Clinical Note
SANTANA, I held his chemo today. He is still having symptoms of cellulitis in legs with eschar/callus on that left middle toe. Sending to wound care and started on more antibiotics. I also sent stool for testing, concerned mostly about c.diff. And he came in in a wheelchair today with worsening SOB since d/c from hospital, so I moved up his scans to tomorrow at noon. Thanks, Osvaldo

## 2025-03-26 NOTE — PROGRESS NOTES
Subjective     Patient ID: Alfie Olson is a 84 y.o. male.     Chief Complaint: Pancreatic Cancer (Treatment clearance)    Oncology History (pre record): Mr. Olson underwent a CT of the abdomen 11/22/2024 for ulcerative colitis follow-up, which incidentally revealed new findings in the lower portion of the lungs. 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 CT of the chest from 2019 shows pleural-based nodules with calcifications consistent with asbestos-related pleural plaques.     Mr. Olson has ulcerative colitis managed with Entyvio infusions at the clinic. He also reports prior diagnoses of COPD and asthma, controlled with an inhaler. His cardiac history includes one stent placed 2-3 years ago, for which he takes Plavix. [facility]     CT chest on 11/27/2024: Interval development of numerous spiculated pulmonary nodules and masses scattered throughout the bilateral lungs.  Findings are highly concerning for malignancy/metastatic disease, however differential also includes septic emboli.  Recommend correlation with tissue sampling.  2. Redemonstration of scattered bilateral calcified and noncalcified pleural plaques.  3. Mild bilateral peripheral predominant interstitial reticulation and ground-glass density suggestive for interstitial lung disease.  4. Trace left pleural fluid.  5. Several hypodense lesions in the pancreatic body/tail which could represent cysts or cystic neoplasm.  Recommend further evaluation with contrast enhanced MRI MRCP pancreatic mass protocol.     PET scan on 11/27/2024: . Innumerable pleural and parenchymal bilateral lung nodules with FDG uptake suggestive of metastatic disease or mesothelioma.  2. Nodule in the left axilla subcutaneous tissues with low level uptake is indeterminate.  Direct visualization warranted.  3. Partially calcified  "mildly enlarged mediastinal lymph nodes with low level uptake are favored to be inflammatory.     MRI/MRCP on 12/11/2024: Probable pancreatic pseudocyst or side duct ectasia in the pancreatic tail.  No suspicious pancreatic lesions.     He underwent biopsy on 12/12/2024. Pathology reveals LINGULA, BIOPSY:--POSITIVE FOR ADENOCARCINOMA.      2. LUNG, RIGHT MIDDLE LOBE NODULE, BIOPSY:--POSITIVE FOR ADENOCARCINOMA.       Comment: The morphologic and immunophenotypic features are not entirely specific    but are most suggestive of pancreaticobiliary primary. Immunohistochemistry was performed with appropriate controls on block 1A based on the histopathologic findings and the results are summarized as  follows:     CK19: Positive. : Positive      He went to the ED on 12/29 with hemoptysis. CTA chest on 12/29/24 revealed "A pulmonary embolus is not demonstrated. Multiple densities bilaterally with a differential of pneumonia and metastatic disease. Small left-sided pleural effusion"  He completed oral antibiotics and his hemoptysis resolved      He started palliative chemo with Gemzar and Abraxane on 01/14/2025    He was hospitalized 01/18/2025 for fever of 100.4, no etiology was found for the infection and he was discharged without any antibiotics  He received cycle 1 day 15 of chemotherapy on 01/29/2025         HPIHe presents today for cycle 3 day 15 of palliative chemotherapy with Gemzar and Abraxane.    Patient admitted to hospital from 3/16/25-3/18/25 for fever noting causative factor being cellulitis of bilateral feet.     Patient endorses constant nausea and seems to be progressing with each dose of chemo despite use of anti-nausea medications.     Review of Systems   Constitutional:  Negative for appetite change, fatigue and unexpected weight change.   HENT:  Negative for mouth sores.    Eyes:  Negative for visual disturbance.   Respiratory:  Positive for shortness of breath. Negative for cough.  "   Cardiovascular:  Positive for leg swelling (bilateral feet). Negative for chest pain.   Gastrointestinal:  Positive for change in bowel habit, diarrhea and nausea. Negative for abdominal pain.   Genitourinary:  Negative for frequency.   Musculoskeletal:  Negative for back pain.   Integumentary:  Negative for rash.   Neurological:  Negative for headaches.   Hematological:  Negative for adenopathy.   Psychiatric/Behavioral:  The patient is not nervous/anxious.    All other systems reviewed and are negative.         Objective     Physical Exam  Vitals reviewed.   Constitutional:       Appearance: He is well-developed.   HENT:      Head: Normocephalic.      Nose: Nose normal.      Mouth/Throat:      Mouth: Mucous membranes are moist.      Pharynx: Oropharynx is clear.   Eyes:      General: No scleral icterus.  Cardiovascular:      Rate and Rhythm: Normal rate and regular rhythm.      Heart sounds: Normal heart sounds. No murmur heard.     Comments: Minimal edema to L ankle noted  Pulmonary:      Effort: Pulmonary effort is normal.      Breath sounds: Normal breath sounds. No wheezing.   Abdominal:      General: Bowel sounds are normal.      Palpations: Abdomen is soft.      Tenderness: There is no abdominal tenderness.   Musculoskeletal:         General: No tenderness. Normal range of motion.      Cervical back: Normal range of motion.      Right lower leg: Edema present.      Left lower leg: Edema present.        Feet:    Feet:      Right foot:      Skin integrity: Erythema and warmth present.      Left foot:      Skin integrity: Erythema, warmth and callus (eschar noted) present.      Comments: See photos below  Lymphadenopathy:      Cervical: No cervical adenopathy.   Skin:     General: Skin is warm.      Coloration: Skin is not pale.      Findings: No rash.   Neurological:      Mental Status: He is alert and oriented to person, place, and time.      Coordination: Coordination normal.   Psychiatric:          Behavior: Behavior normal.         Thought Content: Thought content normal.                LABS:  WBC   Date Value Ref Range Status   03/25/2025 14.54 (H) 3.90 - 12.70 K/uL Final     Hemoglobin   Date Value Ref Range Status   03/18/2025 7.4 (L) 14.0 - 18.0 g/dL Final     HGB   Date Value Ref Range Status   03/25/2025 9.1 (L) 14.0 - 18.0 gm/dL Final     Hematocrit   Date Value Ref Range Status   03/18/2025 22.3 (L) 40.0 - 54.0 % Final     HCT   Date Value Ref Range Status   03/25/2025 27.6 (L) 40.0 - 54.0 % Final     Platelet Count   Date Value Ref Range Status   03/25/2025 306 150 - 450 K/uL Final     Platelets   Date Value Ref Range Status   03/18/2025 272 150 - 450 K/uL Final     Gran # (ANC)   Date Value Ref Range Status   03/25/2025 10.5 K/uL Final   03/18/2025 30.3 (H) 1.8 - 7.7 K/uL Final       Chemistry        Component Value Date/Time     03/25/2025 1107     03/18/2025 0428    K 4.9 03/25/2025 1107    K 4.0 03/18/2025 0428     03/25/2025 1107     03/18/2025 0428    CO2 22 (L) 03/25/2025 1107    CO2 21 (L) 03/18/2025 0428    BUN 16 03/25/2025 1107    CREATININE 1.2 03/25/2025 1107    GLU 89 03/18/2025 0428        Component Value Date/Time    CALCIUM 8.7 03/25/2025 1107    CALCIUM 8.1 (L) 03/18/2025 0428    ALKPHOS 149 03/25/2025 1107    ALKPHOS 207 (H) 03/18/2025 0428    AST 90 (H) 03/25/2025 1107    AST 68 (H) 03/18/2025 0428    ALT 52 (H) 03/25/2025 1107    ALT 40 03/18/2025 0428    BILITOT 0.6 03/25/2025 1107    BILITOT 0.7 03/18/2025 0428          Assessment and Plan     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 Malignant neoplasm metastatic to both lungs    3. Immunodeficiency due to chemotherapy    4. Cellulitis and abscess of foot    5. Diarrhea of presumed infectious origin      Route Chart for Scheduling    Med Onc Chart Routing      Follow up with physician . CT scan rescheduled to 3/27/25, will need appt with Dr. Dunne to review findings.   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 noted on 12/26/2024   Line of treatment: First Line  Treatment Goal: Palliative     Upcoming Treatment Dates - [facility] GI modified nab-PACLitaxel + gemcitabine Q4W    3/26/2025       Chemotherapy       PACLitaxeL protein-bound (ABRAXANE) 125 mg/m2 = 240 mg in 48 mL infusion       gemcitabine (GEMZAR) 1,950 mg in 0.9% NaCl 269.5 mL chemo infusion       Antiemetics       ondansetron injection 8 mg  4/9/2025       Chemotherapy       PACLitaxeL protein-bound (ABRAXANE) 125 mg/m2 = 240 mg in 48 mL infusion       gemcitabine (GEMZAR) 1,950 mg in 0.9% NaCl 269.5 mL chemo infusion       Antiemetics       ondansetron injection 8 mg  4/23/2025       Chemotherapy       PACLitaxeL protein-bound (ABRAXANE) 125 mg/m2 = 240 mg in 48 mL infusion       gemcitabine (GEMZAR) 1,950 mg in 0.9% NaCl 269.5 mL chemo infusion       Antiemetics       ondansetron injection 8 mg  5/7/2025       Chemotherapy       PACLitaxeL protein-bound (ABRAXANE) 125 mg/m2 = 240 mg in 48 mL infusion       gemcitabine (GEMZAR) 1,950 mg in 0.9% NaCl 269.5 mL chemo infusion       Antiemetics       ondansetron injection 8 mg    Therapy Plan Information  ENTYVIO GI for Ulcerative colitis, noted on 10/20/2022  Medications  vedolizumab (ENTYVIO) 300 mg/250 mL NS IVPB  300 mg, Intravenous, Every 4 weeks  PRN Medications  diphenhydrAMINE injection 25 mg  25 mg, Intravenous, PRN  acetaminophen tablet 650 mg  650 mg, Oral, PRN  albuterol-ipratropium 2.5 mg-0.5 mg/3 mL nebulizer solution 3 mL  3 mL, Nebulization, PRN  methylPREDNISolone sodium succinate injection 40 mg  40 mg, Intravenous,  PRN  EPINEPHrine (PF) injection 0.3 mg  0.3 mg, Subcutaneous, PRN  Flushes  0.9% NaCl 250 mL flush bag  Intravenous, Every visit  sodium chloride 0.9% flush 10 mL  10 mL, Intravenous, Every visit  heparin, porcine (PF) 100 unit/mL injection flush 500 Units  500 Units, Intravenous, Every visit  alteplase injection 2 mg  2 mg, Intra-Catheter, Every visit      No therapy plan of the specified type found.    No therapy plan of the specified type found.         Mr. Olson comes in today for palliative chemotherapy with Gemzar and Abraxane.  He recently was discharged from hospital on 3/18/25 due to fever, noting cellulitis of bilateral feet on CT. He was sent home on PO cephalexin 500 mg 1 cap PO 4 times daily for 5 days. He has completed this regimen now and symptoms are still present to both feet. Patient is afebrile.      Will send patient to wound care for cellulitis along with eschar noted to third toe of left foot. Noted swelling and mild erythema (see images above). Will send in short dose antibiotic as well to help cover possible MRSA since patient did not respond to cephalexin.    CT scan rescheduled for 3/27/25 at noon as patient is endorsing worsening SOB; he is unable to ambulate to his mailbox anymore without taking a break which is new to him.     Ulcerative colitis:  Patient endorses worsening diarrhea, noting bowel movements are up to 5 times per day, which he normally only goes twice per day, endorsing watery stool that are extremely malodorous. This started within the last 3 days. He is on Entyvio for his UC.    Will hold chemo today until CT scans resulted and cellulitis of feet improved.    Visit today included increased complexity associated with the care of the episodic problem palliative chemotherapy addressed and managing the longitudinal care of the patient due to the serious and/or complex managed problem(s) pancreatic cancer     Above care plan was discussed with patient and all questions were  addressed tto his  satisfaction     CARLINE Fields  Hematology and Medical Oncology  [facility]  [facility] [place] [facility]    ** minutes of total time spent on the encounter, which includes face to face time and non-face to face time preparing to see the patient (eg, review of tests), Obtaining and/or reviewing separately obtained history, documenting clinical information in the electronic or other health record, independently interpreting results if documented above (not separately reported) and communicating results to the patient/family/caregiver, or Care coordination (not separately reported).

## 2025-03-27 ENCOUNTER — HOSPITAL ENCOUNTER (OUTPATIENT)
Dept: RADIOLOGY | Facility: HOSPITAL | Age: 85
Discharge: HOME | End: 2025-03-27
Attending: INTERNAL MEDICINE
Payer: MEDICARE

## 2025-03-27 DIAGNOSIS — C25.1 MALIGNANT NEOPLASM OF BODY OF PANCREAS: ICD-10-CM

## 2025-03-27 PROCEDURE — 71260 CT THORAX DX C+: CPT | Mod: 26,,, | Performed by: STUDENT IN AN ORGANIZED HEALTH CARE EDUCATION/TRAINING PROGRAM

## 2025-03-27 PROCEDURE — 71260 CT THORAX DX C+: CPT | Mod: TC,PO

## 2025-03-27 PROCEDURE — 74177 CT ABD & PELVIS W/CONTRAST: CPT | Mod: 26,,, | Performed by: STUDENT IN AN ORGANIZED HEALTH CARE EDUCATION/TRAINING PROGRAM

## 2025-03-27 PROCEDURE — 25500020 PHARM REV CODE 255: Mod: PO | Performed by: INTERNAL MEDICINE

## 2025-03-27 RX ADMIN — IOHEXOL 75 ML: 350 INJECTION, SOLUTION INTRAVENOUS at 11:03

## 2025-03-28 ENCOUNTER — RESULTS FOLLOW-UP (OUTPATIENT)
Dept: HEMATOLOGY/ONCOLOGY | Facility: CLINIC | Age: 85
End: 2025-03-28
Payer: OTHER GOVERNMENT

## 2025-03-28 DIAGNOSIS — A04.72 CLOSTRIDIUM DIFFICILE COLITIS: Primary | ICD-10-CM

## 2025-03-28 LAB
C DIFF GDH STL QL: POSITIVE
C DIFF TOX A+B STL QL IA: NEGATIVE
C DIFF TOX GENS STL QL NAA+PROBE: POSITIVE
CLOSTRIDIUM DIFFICILE EPI 027: ABNORMAL

## 2025-03-28 NOTE — PROGRESS NOTES
Patient stool sample resulted positive for C. Diff. Spoke with patient and informed of positive result. States his bilateral feet are improving on the Bactrim - swelling and pain are decreasing since starting 2 days ago. Will start patient in Fidaxomicin 200 mg BID x 10 days and also continue on Bactrim for now. Interactions run and showed no drug-drug interactions with Bactrim and Fidaxomicin. This is his first infections with this that he can recall. Patient endorses diarrhea continues, but has not worsened, states the smell of his bowel movements aren't quite as bad as they were a couple days ago. Patient and son verbalized understanding and in agreement with plan. Patient will have close follow up in clinic.    CARLINE Fields  Hematology and Medical Oncology  [facility]  [facility] [place] [facility]

## 2025-03-31 ENCOUNTER — TELEPHONE (OUTPATIENT)
Dept: HEMATOLOGY/ONCOLOGY | Facility: CLINIC | Age: 85
End: 2025-03-31
Payer: MEDICARE

## 2025-03-31 NOTE — TELEPHONE ENCOUNTER
Call spoke to Mr. Olson about a prescription that Faviola had sent  To [facility] it was to expensive so [facility]  Spoke to Faviola about it and [facility].  Pt verbalized understanding and will call with any other concerns.

## 2025-04-01 ENCOUNTER — TELEPHONE (OUTPATIENT)
Dept: HEMATOLOGY/ONCOLOGY | Facility: CLINIC | Age: 85
End: 2025-04-01
Payer: MEDICARE

## 2025-04-01 NOTE — TELEPHONE ENCOUNTER
----- Message from [place] sent at 4/1/2025  1:38 PM CDT -----  Type: Needs Medical AdviceWho Called:  Patient Symptoms (please be specific):  How long has patient had these symptoms:  Pharmacy name and phone #:  Best Call Back Number: [phone] Information: Patient is requesting a call back regarding receiving 2 of the same prescripts from different Pharmacy.1 from [facility] 2 [facility]

## 2025-04-01 NOTE — TELEPHONE ENCOUNTER
Spoke with patient.  He is about to finish bactrim tomorrow for his cellulitis.    He has yet to obtain the medication for c diff, as he is [facility] to [facility] for this medication.    He knows to contact nurse if dr pedraza/her team can help in any way.

## 2025-04-03 ENCOUNTER — TELEPHONE (OUTPATIENT)
Dept: HEMATOLOGY/ONCOLOGY | Facility: CLINIC | Age: 85
End: 2025-04-03
Payer: MEDICARE

## 2025-04-03 NOTE — TELEPHONE ENCOUNTER
Spoke with patient who is calling to state he is having difficulty obtaining medication for c diff via [facility], as a medical necessity/pa form needs to be completed. Medication needed is dificid.   Patient states he also needs a continuation of care form completed as well.    [phone]---and [facility] to nurse to complete.

## 2025-04-03 NOTE — TELEPHONE ENCOUNTER
----- Message from Shayy sent at 4/3/2025  2:05 PM CDT -----  Contact: self  Type: Needs Medical AdviceWho Called:  the patient Best Call Back Number: [phone] Information: request of Continuation Of Care his  after 90 days and needs a new one to [facility], [facility] #[phone]  Still having issues getting scripts filled, been two weeks with no meds, please call ramiro Montoya [phone]

## 2025-04-03 NOTE — TELEPHONE ENCOUNTER
----- Message from Shayy sent at 4/3/2025  2:05 PM CDT -----  Contact: self  Type: Needs Medical AdviceWho Called:  the patient Best Call Back Number: [phone] Information: request of Continuation Of Care his  after 90 days and needs a new one to [facility] #[phone]  Still having issues getting scripts filled, been two weeks with no meds, please call ramiro Montoya [phone]

## 2025-04-04 NOTE — TELEPHONE ENCOUNTER
[facility] called [facility] to make sure [facility] was ready to be picked [facility] or if [facility] needed from me, as Osvaldo wanted to be sure patient started medication by today.  Left  at [phone]

## 2025-04-07 ENCOUNTER — LAB VISIT (OUTPATIENT)
Dept: LAB | Facility: HOSPITAL | Age: 85
End: 2025-04-07
Attending: INTERNAL MEDICINE
Payer: OTHER GOVERNMENT

## 2025-04-07 DIAGNOSIS — C25.1 MALIGNANT NEOPLASM OF BODY OF PANCREAS: ICD-10-CM

## 2025-04-07 LAB
ABSOLUTE EOSINOPHIL (OHS): 0.2 K/UL
ABSOLUTE MONOCYTE (OHS): 0.93 K/UL (ref 0.3–1)
ALBUMIN SERPL BCP-MCNC: 3.7 G/DL (ref 3.5–5.2)
ALP SERPL-CCNC: 92 UNIT/L (ref 40–150)
ALT SERPL W/O P-5'-P-CCNC: 26 UNIT/L (ref 10–44)
ANION GAP (OHS): 9 MMOL/L (ref 8–16)
AST SERPL-CCNC: 30 UNIT/L (ref 11–45)
BASOPHILS # BLD AUTO: 0.08 K/UL
BASOPHILS NFR BLD AUTO: 0.9 %
BILIRUB SERPL-MCNC: 0.9 MG/DL (ref 0.1–1)
BUN SERPL-MCNC: 24 MG/DL (ref 8–23)
CALCIUM SERPL-MCNC: 9.1 MG/DL (ref 8.7–10.5)
CANCER AG19-9 SERPL-ACNC: 104.3 U/ML
CHLORIDE SERPL-SCNC: 108 MMOL/L (ref 95–110)
CO2 SERPL-SCNC: 22 MMOL/L (ref 23–29)
CREAT SERPL-MCNC: 1.3 MG/DL (ref 0.5–1.4)
ERYTHROCYTE [DISTWIDTH] IN BLOOD BY AUTOMATED COUNT: 19.4 % (ref 11.5–14.5)
GFR SERPLBLD CREATININE-BSD FMLA CKD-EPI: 55 ML/MIN/1.73/M2
GLUCOSE SERPL-MCNC: 95 MG/DL (ref 70–110)
HCT VFR BLD AUTO: 29 % (ref 40–54)
HGB BLD-MCNC: 9.3 GM/DL (ref 14–18)
IMM GRANULOCYTES # BLD AUTO: 0.06 K/UL (ref 0–0.04)
IMM GRANULOCYTES NFR BLD AUTO: 0.6 % (ref 0–0.5)
LYMPHOCYTES # BLD AUTO: 2.89 K/UL (ref 1–4.8)
MCH RBC QN AUTO: 32 PG (ref 27–31)
MCHC RBC AUTO-ENTMCNC: 32.1 G/DL (ref 32–36)
MCV RBC AUTO: 100 FL (ref 82–98)
NUCLEATED RBC (/100WBC) (OHS): 0 /100 WBC
PLATELET # BLD AUTO: 400 K/UL (ref 150–450)
PMV BLD AUTO: 9.4 FL (ref 9.2–12.9)
POTASSIUM SERPL-SCNC: 5.6 MMOL/L (ref 3.5–5.1)
PROT SERPL-MCNC: 7.4 GM/DL (ref 6–8.4)
RBC # BLD AUTO: 2.91 M/UL (ref 4.6–6.2)
RELATIVE EOSINOPHIL (OHS): 2.2 %
RELATIVE LYMPHOCYTE (OHS): 31.1 % (ref 18–48)
RELATIVE MONOCYTE (OHS): 10 % (ref 4–15)
RELATIVE NEUTROPHIL (OHS): 55.2 % (ref 38–73)
SODIUM SERPL-SCNC: 139 MMOL/L (ref 136–145)
WBC # BLD AUTO: 9.3 K/UL (ref 3.9–12.7)

## 2025-04-07 PROCEDURE — 82310 ASSAY OF CALCIUM: CPT | Mod: PO

## 2025-04-07 PROCEDURE — 36415 COLL VENOUS BLD VENIPUNCTURE: CPT | Mod: PO

## 2025-04-07 PROCEDURE — 86301 IMMUNOASSAY TUMOR CA 19-9: CPT

## 2025-04-07 PROCEDURE — 85025 COMPLETE CBC W/AUTO DIFF WBC: CPT | Mod: PO

## 2025-04-08 ENCOUNTER — OFFICE VISIT (OUTPATIENT)
Dept: HEMATOLOGY/ONCOLOGY | Facility: CLINIC | Age: 85
End: 2025-04-08
Payer: MEDICARE

## 2025-04-08 ENCOUNTER — HOSPITAL ENCOUNTER (OUTPATIENT)
Dept: RADIOLOGY | Facility: HOSPITAL | Age: 85
Discharge: HOME | End: 2025-04-08
Attending: INTERNAL MEDICINE
Payer: MEDICARE

## 2025-04-08 VITALS
SYSTOLIC BLOOD PRESSURE: 140 MMHG | BODY MASS INDEX: 25.96 KG/M2 | DIASTOLIC BLOOD PRESSURE: 74 MMHG | WEIGHT: 171.31 LBS | TEMPERATURE: 98 F | HEIGHT: 68 IN | HEART RATE: 85 BPM | OXYGEN SATURATION: 96 % | RESPIRATION RATE: 16 BRPM

## 2025-04-08 DIAGNOSIS — D84.821 IMMUNODEFICIENCY DUE TO CHEMOTHERAPY: ICD-10-CM

## 2025-04-08 DIAGNOSIS — R23.4 ESCHAR OF TOE: ICD-10-CM

## 2025-04-08 DIAGNOSIS — C25.1 MALIGNANT NEOPLASM OF BODY OF PANCREAS: Primary | ICD-10-CM

## 2025-04-08 DIAGNOSIS — R60.9 EDEMA, UNSPECIFIED TYPE: ICD-10-CM

## 2025-04-08 DIAGNOSIS — C78.02 MALIGNANT NEOPLASM METASTATIC TO BOTH LUNGS: ICD-10-CM

## 2025-04-08 DIAGNOSIS — K51.00 ULCERATIVE PANCOLITIS WITHOUT COMPLICATION: ICD-10-CM

## 2025-04-08 DIAGNOSIS — T45.1X5A IMMUNODEFICIENCY DUE TO CHEMOTHERAPY: ICD-10-CM

## 2025-04-08 DIAGNOSIS — C78.01 MALIGNANT NEOPLASM METASTATIC TO BOTH LUNGS: ICD-10-CM

## 2025-04-08 DIAGNOSIS — Z79.69 IMMUNODEFICIENCY DUE TO CHEMOTHERAPY: ICD-10-CM

## 2025-04-08 PROCEDURE — 99999 PR PBB SHADOW E&M-EST. PATIENT-LVL V: CPT | Mod: PBBFAC,,, | Performed by: INTERNAL MEDICINE

## 2025-04-08 PROCEDURE — G2211 COMPLEX E/M VISIT ADD ON: HCPCS | Mod: S$PBB,,, | Performed by: INTERNAL MEDICINE

## 2025-04-08 PROCEDURE — 99215 OFFICE O/P EST HI 40 MIN: CPT | Mod: S$PBB,,, | Performed by: INTERNAL MEDICINE

## 2025-04-08 PROCEDURE — 93970 EXTREMITY STUDY: CPT | Mod: TC,PO

## 2025-04-08 PROCEDURE — 99215 OFFICE O/P EST HI 40 MIN: CPT | Mod: PBBFAC,25,PN | Performed by: INTERNAL MEDICINE

## 2025-04-08 PROCEDURE — 93970 EXTREMITY STUDY: CPT | Mod: 26,,, | Performed by: STUDENT IN AN ORGANIZED HEALTH CARE EDUCATION/TRAINING PROGRAM

## 2025-04-08 RX ORDER — SULFAMETHOXAZOLE AND TRIMETHOPRIM 800; 160 MG/1; MG/1
1 TABLET ORAL 2 TIMES DAILY
COMMUNITY
Start: 2025-03-26

## 2025-04-08 NOTE — PROGRESS NOTES
Subjective     Patient ID: Alfie Olson is a 84 y.o. male.    Chief Complaint: Pancreatic Cancer  Mr. Olson underwent a CT of the abdomen 11/22/2024 for ulcerative colitis follow-up, which incidentally revealed new findings in the lower portion of the lungs. 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 CT of the chest from 2019 shows pleural-based nodules with calcifications consistent with asbestos-related pleural plaques.     Mr. Olson has ulcerative colitis managed with Entyvio infusions at the clinic. He also reports prior diagnoses of COPD and asthma, controlled with an inhaler. His cardiac history includes one stent placed 2-3 years ago, for which he takes Plavix. [facility]     CT chest on 11/27/2024: Interval development of numerous spiculated pulmonary nodules and masses scattered throughout the bilateral lungs.  Findings are highly concerning for malignancy/metastatic disease, however differential also includes septic emboli.  Recommend correlation with tissue sampling.  2. Redemonstration of scattered bilateral calcified and noncalcified pleural plaques.  3. Mild bilateral peripheral predominant interstitial reticulation and ground-glass density suggestive for interstitial lung disease.  4. Trace left pleural fluid.  5. Several hypodense lesions in the pancreatic body/tail which could represent cysts or cystic neoplasm.  Recommend further evaluation with contrast enhanced MRI MRCP pancreatic mass protocol.     PET scan on 11/27/2024: . Innumerable pleural and parenchymal bilateral lung nodules with FDG uptake suggestive of metastatic disease or mesothelioma.  2. Nodule in the left axilla subcutaneous tissues with low level uptake is indeterminate.  Direct visualization warranted.  3. Partially calcified mildly enlarged mediastinal lymph nodes with low level  "uptake are favored to be inflammatory.     MRI/MRCP on 12/11/2024: Probable pancreatic pseudocyst or side duct ectasia in the pancreatic tail.  No suspicious pancreatic lesions.     He underwent biopsy on 12/12/2024. Pathology reveals LINGULA, BIOPSY:--POSITIVE FOR ADENOCARCINOMA.      2. LUNG, RIGHT MIDDLE LOBE NODULE, BIOPSY:--POSITIVE FOR ADENOCARCINOMA.       Comment: The morphologic and immunophenotypic features are not entirely specific    but are most suggestive of pancreaticobiliary primary. Immunohistochemistry was performed with appropriate controls on block 1A based on the histopathologic findings and the results are summarized as  follows:     CK19: Positive. : Positive      He went to the ED on 12/29 with hemoptysis. CTA chest on 12/29/24 revealed "A pulmonary embolus is not demonstrated. Multiple densities bilaterally with a differential of pneumonia and metastatic disease. Small left-sided pleural effusion"  He completed oral antibiotics and his hemoptysis resolved      He started palliative chemo with Gemzar and Abraxane on 01/14/2025    He was hospitalized 01/18/2025 for fever of 100.4, no etiology was found for the infection and he was discharged without any antibiotics  He received cycle 1 day 15 of chemotherapy on 01/29/2025         HPIHe comes in today for cycle 3 day 15 of palliative chemotherapy with Gemzar and Abraxane    He was hospitalized from 3/16-3/18 for leg cellulitis, last chemo on 3/12/2025    CT CAP from 3/27/2025 reveal "Grossly stable extensive pulmonary metastasis with small bilateral pleural effusions.  2. Mild progression of mediastinal lymphadenopathy which could represent metastasis.  3. No evidence of new distant extra thoracic metastasis.  4. Splenomegaly and prominence of multiple small venous structures throughout the abdomen raising the question of possible portal venous hypertension.  5. Cholelithiasis and continued gallbladder distension similar to prior exams.  " "No other secondary signs of acute cholecystitis.  6. Severe diffuse fatty atrophy of the pancreas with stable cystic lesions.  7. Mild wall thickening of the descending and sigmoid colon without significant pericolonic inflammatory changes, nonspecific however could represent mild colitis.  Recommend clinical correlation"  He is here with his son.   He is currently on treatment for C diff colitis    Review of Systems   Constitutional:  Positive for appetite change and unexpected weight change.   HENT:  Negative for mouth sores.    Eyes:  Negative for visual disturbance.   Respiratory:  Positive for cough and shortness of breath.    Cardiovascular:  Negative for chest pain.   Gastrointestinal:  Positive for diarrhea. Negative for abdominal pain.   Genitourinary:  Positive for frequency.   Musculoskeletal:  Positive for back pain.   Integumentary:  Negative for rash.   Neurological:  Negative for headaches.   Hematological:  Negative for adenopathy.   Psychiatric/Behavioral:  The patient is nervous/anxious.           Objective     Physical Exam      LABS:  WBC   Date Value Ref Range Status   04/07/2025 9.30 3.90 - 12.70 K/uL Final     Hemoglobin   Date Value Ref Range Status   03/18/2025 7.4 (L) 14.0 - 18.0 g/dL Final     HGB   Date Value Ref Range Status   04/07/2025 9.3 (L) 14.0 - 18.0 gm/dL Final     Hematocrit   Date Value Ref Range Status   03/18/2025 22.3 (L) 40.0 - 54.0 % Final     HCT   Date Value Ref Range Status   04/07/2025 29.0 (L) 40.0 - 54.0 % Final     Platelet Count   Date Value Ref Range Status   04/07/2025 400 150 - 450 K/uL Final     Platelets   Date Value Ref Range Status   03/18/2025 272 150 - 450 K/uL Final     Gran # (ANC)   Date Value Ref Range Status   03/25/2025 10.5 K/uL Final   03/18/2025 30.3 (H) 1.8 - 7.7 K/uL Final       Chemistry        Component Value Date/Time     04/07/2025 1251     03/18/2025 0428    K 5.6 (H) 04/07/2025 1251    K 4.0 03/18/2025 0428     04/07/2025 " 1251     03/18/2025 0428    CO2 22 (L) 04/07/2025 1251    CO2 21 (L) 03/18/2025 0428    BUN 24 (H) 04/07/2025 1251    CREATININE 1.3 04/07/2025 1251    GLU 89 03/18/2025 0428        Component Value Date/Time    CALCIUM 9.1 04/07/2025 1251    CALCIUM 8.1 (L) 03/18/2025 0428    ALKPHOS 92 04/07/2025 1251    ALKPHOS 207 (H) 03/18/2025 0428    AST 30 04/07/2025 1251    AST 68 (H) 03/18/2025 0428    ALT 26 04/07/2025 1251    ALT 40 03/18/2025 0428    BILITOT 0.9 04/07/2025 1251    BILITOT 0.7 03/18/2025 0428        : 104 (4/7/2025)       Assessment and Plan     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 Malignant neoplasm metastatic to both lungs    4. Edema, unspecified type  -     US Lower Extremity Veins Bilateral; Future; Expected date: 04/08/2025    5. Eschar of toe    6. Ulcerative pancolitis without complication        Route Chart for Scheduling    Med Onc Chart Routing  Urgent    Follow up with physician . Cancel chemo tomorow. Keep entyvio as scheduled.   Follow up with ROMANA . In 2 weeks schedule CBC, CMP and see ROMANA and for gemzar and Abarxane   Infusion scheduling note    Injection scheduling note    Labs    Imaging    Pharmacy appointment    Other referrals                  Treatment Plan Information   [facility] modified nab-PACLitaxel + gemcitabine Q4W Lacy Dunne MD   Associated diagnosis: Malignant neoplasm of body of pancreas Stage IV cT1c, cN0, cM1 noted on 12/26/2024   Line of treatment: First Line  Treatment Goal: Palliative     Upcoming Treatment Dates - [facility] modified nab-PACLitaxel + gemcitabine Q4W    4/9/2025       Chemotherapy       PACLitaxeL protein-bound (ABRAXANE) 125 mg/m2 = 240 mg in 48 mL infusion       gemcitabine (GEMZAR) 1,950 mg in 0.9% NaCl 269.5 mL chemo infusion       Antiemetics       ondansetron injection 8 mg  4/23/2025       Chemotherapy       PACLitaxeL protein-bound (ABRAXANE) 125 mg/m2 = 240 mg in 48 mL infusion        gemcitabine (GEMZAR) 1,950 mg in 0.9% NaCl 269.5 mL chemo infusion       Antiemetics       ondansetron injection 8 mg  5/7/2025       Chemotherapy       PACLitaxeL protein-bound (ABRAXANE) 125 mg/m2 = 240 mg in 48 mL infusion       gemcitabine (GEMZAR) 1,950 mg in 0.9% NaCl 269.5 mL chemo infusion       Antiemetics       ondansetron injection 8 mg  5/21/2025       Chemotherapy       PACLitaxeL protein-bound (ABRAXANE) 125 mg/m2 = 240 mg in 48 mL infusion       gemcitabine (GEMZAR) 1,950 mg in 0.9% NaCl 269.5 mL chemo infusion       Antiemetics       ondansetron injection 8 mg    Therapy Plan Information  ENTYVIO GI for Ulcerative colitis, noted on 10/20/2022  Medications  vedolizumab (ENTYVIO) 300 mg/250 mL NS IVPB  300 mg, Intravenous, Every 4 weeks  PRN Medications  diphenhydrAMINE injection 25 mg  25 mg, Intravenous, PRN  acetaminophen tablet 650 mg  650 mg, Oral, PRN  albuterol-ipratropium 2.5 mg-0.5 mg/3 mL nebulizer solution 3 mL  3 mL, Nebulization, PRN  methylPREDNISolone sodium succinate injection 40 mg  40 mg, Intravenous, PRN  EPINEPHrine (PF) injection 0.3 mg  0.3 mg, Subcutaneous, PRN  Flushes  0.9% NaCl 250 mL flush bag  Intravenous, Every visit  sodium chloride 0.9% flush 10 mL  10 mL, Intravenous, Every visit  heparin, porcine (PF) 100 unit/mL injection flush 500 Units  500 Units, Intravenous, Every visit  alteplase injection 2 mg  2 mg, Intra-Catheter, Every visit      No therapy plan of the specified type found.    No therapy plan of the specified type found.         Mr. Olson comes in today to review his recent hospitalization for cellulitis, he still has not met with wound care  He has legs appear better but he does have an eschar under his toe on his left foot which needs further evaluation.  Continue to hold chemotherapy for now since he is also on C diff treatment with antibiotics   Reviewed CT scans which are essentially stable    He will continue with Entyvio for his ulcerative colitis and  return in 2 weeks at which point if he has improved with above issues we will resume chemotherapy    Also obtain lower extremity ultrasound today to evaluate for DVT and he does not have any evidence of thrombosis    Visit today included increased complexity associated with the care of the episodic problem palliative chemotherapy addressed and managing the longitudinal care of the patient due to the serious and/or complex managed problem(s) pancreatic cancer    Above plan reviewed with the patient and his son and all questions were answered to their satisfaction

## 2025-04-09 ENCOUNTER — TELEPHONE (OUTPATIENT)
Dept: INFUSION THERAPY | Facility: HOSPITAL | Age: 85
End: 2025-04-09

## 2025-04-09 ENCOUNTER — TELEPHONE (OUTPATIENT)
Dept: INFUSION THERAPY | Facility: HOSPITAL | Age: 85
End: 2025-04-09
Payer: MEDICARE

## 2025-04-09 DIAGNOSIS — C25.1 MALIGNANT NEOPLASM OF BODY OF PANCREAS: Primary | ICD-10-CM

## 2025-04-09 NOTE — TELEPHONE ENCOUNTER
Attempted to contact patient in regards to scheduled infusion today. Per Dr. Ervin he wanted to cancel infusion today due to recent hospitalization and being on antibiotics. Called patient and left detailed message of this and to call back and reschedule. I did reschedule next infusion already.

## 2025-04-10 ENCOUNTER — TELEPHONE (OUTPATIENT)
Dept: GASTROENTEROLOGY | Facility: CLINIC | Age: 85
End: 2025-04-10
Payer: MEDICARE

## 2025-04-10 NOTE — TELEPHONE ENCOUNTER
Contacted pt to verify if 4/16/25 @ 3PM is a good date and time to come in and see [facility] mejia and Dr GUTIERRES. Pt did not answer, left vm w/callback # asking pt to return call to verify appt

## 2025-04-11 DIAGNOSIS — R23.4 ESCHAR OF TOE: Primary | ICD-10-CM

## 2025-04-23 ENCOUNTER — OFFICE VISIT (OUTPATIENT)
Dept: HEMATOLOGY/ONCOLOGY | Facility: CLINIC | Age: 85
End: 2025-04-23
Payer: OTHER GOVERNMENT

## 2025-04-23 ENCOUNTER — TELEPHONE (OUTPATIENT)
Dept: HEMATOLOGY/ONCOLOGY | Facility: CLINIC | Age: 85
End: 2025-04-23
Payer: OTHER GOVERNMENT

## 2025-04-23 ENCOUNTER — LAB VISIT (OUTPATIENT)
Dept: LAB | Facility: HOSPITAL | Age: 85
End: 2025-04-23
Attending: INTERNAL MEDICINE
Payer: OTHER GOVERNMENT

## 2025-04-23 VITALS
HEIGHT: 68 IN | WEIGHT: 169.56 LBS | BODY MASS INDEX: 25.7 KG/M2 | OXYGEN SATURATION: 98 % | SYSTOLIC BLOOD PRESSURE: 145 MMHG | RESPIRATION RATE: 17 BRPM | HEART RATE: 73 BPM | DIASTOLIC BLOOD PRESSURE: 71 MMHG | TEMPERATURE: 98 F

## 2025-04-23 DIAGNOSIS — C25.1 MALIGNANT NEOPLASM OF BODY OF PANCREAS: Primary | ICD-10-CM

## 2025-04-23 DIAGNOSIS — C25.1 MALIGNANT NEOPLASM OF BODY OF PANCREAS: ICD-10-CM

## 2025-04-23 DIAGNOSIS — T45.1X5A IMMUNODEFICIENCY DUE TO CHEMOTHERAPY: ICD-10-CM

## 2025-04-23 DIAGNOSIS — A04.72 CLOSTRIDIUM DIFFICILE COLITIS: ICD-10-CM

## 2025-04-23 DIAGNOSIS — D84.821 IMMUNODEFICIENCY DUE TO CHEMOTHERAPY: ICD-10-CM

## 2025-04-23 DIAGNOSIS — Z79.69 IMMUNODEFICIENCY DUE TO CHEMOTHERAPY: ICD-10-CM

## 2025-04-23 DIAGNOSIS — C78.02 MALIGNANT NEOPLASM METASTATIC TO BOTH LUNGS: ICD-10-CM

## 2025-04-23 DIAGNOSIS — J06.9 UPPER RESPIRATORY TRACT INFECTION, UNSPECIFIED TYPE: ICD-10-CM

## 2025-04-23 DIAGNOSIS — C78.01 MALIGNANT NEOPLASM METASTATIC TO BOTH LUNGS: ICD-10-CM

## 2025-04-23 LAB
ABSOLUTE NEUTROPHIL COUNT (OHS): 6.51 K/UL (ref 1.8–7.7)
ALBUMIN SERPL BCP-MCNC: 3.5 G/DL (ref 3.5–5.2)
ALP SERPL-CCNC: 68 UNIT/L (ref 40–150)
ALT SERPL W/O P-5'-P-CCNC: 18 UNIT/L (ref 10–44)
ANION GAP (OHS): 7 MMOL/L (ref 8–16)
AST SERPL-CCNC: 25 UNIT/L (ref 11–45)
BILIRUB SERPL-MCNC: 0.6 MG/DL (ref 0.1–1)
BUN SERPL-MCNC: 24 MG/DL (ref 8–23)
CALCIUM SERPL-MCNC: 8.7 MG/DL (ref 8.7–10.5)
CHLORIDE SERPL-SCNC: 107 MMOL/L (ref 95–110)
CO2 SERPL-SCNC: 24 MMOL/L (ref 23–29)
CREAT SERPL-MCNC: 1.3 MG/DL (ref 0.5–1.4)
ERYTHROCYTE [DISTWIDTH] IN BLOOD BY AUTOMATED COUNT: 16 % (ref 11.5–14.5)
GFR SERPLBLD CREATININE-BSD FMLA CKD-EPI: 55 ML/MIN/1.73/M2
GLUCOSE SERPL-MCNC: 98 MG/DL (ref 70–110)
HCT VFR BLD AUTO: 26.6 % (ref 40–54)
HGB BLD-MCNC: 8.7 GM/DL (ref 14–18)
IMM GRANULOCYTES # BLD AUTO: 0.03 K/UL (ref 0–0.04)
MAGNESIUM SERPL-MCNC: 1.8 MG/DL (ref 1.6–2.6)
MCH RBC QN AUTO: 31.8 PG (ref 27–31)
MCHC RBC AUTO-ENTMCNC: 32.7 G/DL (ref 32–36)
MCV RBC AUTO: 97 FL (ref 82–98)
PLATELET # BLD AUTO: 221 K/UL (ref 150–450)
PMV BLD AUTO: 9.4 FL (ref 9.2–12.9)
POTASSIUM SERPL-SCNC: 4.5 MMOL/L (ref 3.5–5.1)
PROT SERPL-MCNC: 7.1 GM/DL (ref 6–8.4)
RBC # BLD AUTO: 2.74 M/UL (ref 4.6–6.2)
SODIUM SERPL-SCNC: 138 MMOL/L (ref 136–145)
WBC # BLD AUTO: 10.39 K/UL (ref 3.9–12.7)

## 2025-04-23 PROCEDURE — 36415 COLL VENOUS BLD VENIPUNCTURE: CPT | Mod: PN

## 2025-04-23 PROCEDURE — G2211 COMPLEX E/M VISIT ADD ON: HCPCS | Mod: S$PBB,,, | Performed by: NURSE PRACTITIONER

## 2025-04-23 PROCEDURE — 99215 OFFICE O/P EST HI 40 MIN: CPT | Mod: S$PBB,,, | Performed by: NURSE PRACTITIONER

## 2025-04-23 PROCEDURE — 99999 PR PBB SHADOW E&M-EST. PATIENT-LVL IV: CPT | Mod: PBBFAC,,, | Performed by: NURSE PRACTITIONER

## 2025-04-23 PROCEDURE — 85027 COMPLETE CBC AUTOMATED: CPT | Mod: PN

## 2025-04-23 PROCEDURE — 82040 ASSAY OF SERUM ALBUMIN: CPT | Mod: PN

## 2025-04-23 PROCEDURE — 83735 ASSAY OF MAGNESIUM: CPT | Mod: PN

## 2025-04-23 PROCEDURE — 99214 OFFICE O/P EST MOD 30 MIN: CPT | Mod: PBBFAC,PN | Performed by: NURSE PRACTITIONER

## 2025-04-23 RX ORDER — DOXYCYCLINE 100 MG/1
100 CAPSULE ORAL EVERY 12 HOURS
Qty: 14 CAPSULE | Refills: 0 | Status: SHIPPED | OUTPATIENT
Start: 2025-04-23 | End: 2025-04-30

## 2025-04-23 NOTE — TELEPHONE ENCOUNTER
----- Message from Sierra sent at 4/23/2025  1:53 PM CDT -----  Type:  Patient Returning CallWho Called:  ptWho Left Message for Patient:  Bella Does the patient know what this is regarding?:  yes Best Call Back Number:   [phone] Information:

## 2025-04-23 NOTE — PROGRESS NOTES
Subjective     Patient ID: Alfie Olson is a 84 y.o. male.    Chief Complaint: Pancreatic Cancer (Treatment clearance)      Oncology History (per record): Mr. Olson underwent a CT of the abdomen 11/22/2024 for ulcerative colitis follow-up, which incidentally revealed new findings in the lower portion of the lungs. 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 CT of the chest from 2019 shows pleural-based nodules with calcifications consistent with asbestos-related pleural plaques.     Mr. Olson has ulcerative colitis managed with Entyvio infusions at the clinic. He also reports prior diagnoses of COPD and asthma, controlled with an inhaler. His cardiac history includes one stent placed 2-3 years ago, for which he takes Plavix. [facility]     CT chest on 11/27/2024: Interval development of numerous spiculated pulmonary nodules and masses scattered throughout the bilateral lungs.  Findings are highly concerning for malignancy/metastatic disease, however differential also includes septic emboli.  Recommend correlation with tissue sampling.  2. Redemonstration of scattered bilateral calcified and noncalcified pleural plaques.  3. Mild bilateral peripheral predominant interstitial reticulation and ground-glass density suggestive for interstitial lung disease.  4. Trace left pleural fluid.  5. Several hypodense lesions in the pancreatic body/tail which could represent cysts or cystic neoplasm.  Recommend further evaluation with contrast enhanced MRI MRCP pancreatic mass protocol.     PET scan on 11/27/2024: . Innumerable pleural and parenchymal bilateral lung nodules with FDG uptake suggestive of metastatic disease or mesothelioma.  2. Nodule in the left axilla subcutaneous tissues with low level uptake is indeterminate.  Direct visualization warranted.  3. Partially calcified  "mildly enlarged mediastinal lymph nodes with low level uptake are favored to be inflammatory.     MRI/MRCP on 12/11/2024: Probable pancreatic pseudocyst or side duct ectasia in the pancreatic tail.  No suspicious pancreatic lesions.     He underwent biopsy on 12/12/2024. Pathology reveals LINGULA, BIOPSY:--POSITIVE FOR ADENOCARCINOMA.      2. LUNG, RIGHT MIDDLE LOBE NODULE, BIOPSY:--POSITIVE FOR ADENOCARCINOMA.       Comment: The morphologic and immunophenotypic features are not entirely specific    but are most suggestive of pancreaticobiliary primary. Immunohistochemistry was performed with appropriate controls on block 1A based on the histopathologic findings and the results are summarized as  follows:     CK19: Positive. : Positive      He went to the ED on 12/29 with hemoptysis. CTA chest on 12/29/24 revealed "A pulmonary embolus is not demonstrated. Multiple densities bilaterally with a differential of pneumonia and metastatic disease. Small left-sided pleural effusion"  He completed oral antibiotics and his hemoptysis resolved      He started palliative chemo with Gemzar and Abraxane on 01/14/2025    He was hospitalized 01/18/2025 for fever of 100.4, no etiology was found for the infection and he was discharged without any antibiotics  He received cycle 1 day 15 of chemotherapy on 01/29/2025    CT CAP from 3/27/2025 reveal "Grossly stable extensive pulmonary metastasis with small bilateral pleural effusions.  2. Mild progression of mediastinal lymphadenopathy which could represent metastasis.  3. No evidence of new distant extra thoracic metastasis.  4. Splenomegaly and prominence of multiple small venous structures throughout the abdomen raising the question of possible portal venous hypertension.  5. Cholelithiasis and continued gallbladder distension similar to prior exams.  No other secondary signs of acute cholecystitis.  6. Severe diffuse fatty atrophy of the pancreas with stable cystic " "lesions.  7. Mild wall thickening of the descending and sigmoid colon without significant pericolonic inflammatory changes, nonspecific however could represent mild colitis.  Recommend clinical correlation"        HPIMrTriny Nagy presents to clinic today with his son prior to palliative chemotherapy with Gemzar and Abraxane. He has been on hold due to LE cellulitis and c.diff infection. Last chemo on 3/12/25.     He completed fidaxomicin for c.diff and he endorses that he continues with diarrhea. Does endorse the smell of his stool has improved, but not back to normal.     Hearing wheezing, increasing use of nebulizer. SOB has worsened especially with exertion. No longer using WC.    Seeing PCP on 4/28/25 - [facility]    Patient also states his legs have continued to swell but redness and swelling have improved.      Review of Systems   Constitutional:  Positive for appetite change (decreased) and unexpected weight change.   HENT:  Negative for mouth sores.    Eyes:  Negative for visual disturbance.   Respiratory:  Positive for cough and shortness of breath.    Cardiovascular:  Negative for chest pain.   Gastrointestinal:  Positive for diarrhea. Negative for abdominal pain.   Genitourinary:  Negative for difficulty urinating.   Musculoskeletal:  Positive for back pain.   Integumentary:  Negative for rash.   Neurological:  Negative for headaches.   Hematological:  Negative for adenopathy.   Psychiatric/Behavioral:  The patient is nervous/anxious.           Objective     Physical Exam  Vitals reviewed.   Constitutional:       Appearance: He is well-developed.   HENT:      Head: Normocephalic.      Nose: Nose normal.      Mouth/Throat:      Mouth: Mucous membranes are moist.      Pharynx: Oropharynx is clear.   Eyes:      General: No scleral icterus.  Cardiovascular:      Rate and Rhythm: Normal rate and regular rhythm.      Heart sounds: Normal heart sounds. No murmur heard.     Comments: Minimal " edema to L ankle noted  Pulmonary:      Effort: Pulmonary effort is normal.      Breath sounds: Normal breath sounds. No wheezing.   Abdominal:      General: Bowel sounds are normal.      Palpations: Abdomen is soft.      Tenderness: There is no abdominal tenderness.   Musculoskeletal:         General: No tenderness. Normal range of motion.      Cervical back: Normal range of motion.      Right lower leg: Edema (scant) present.      Left lower leg: Edema (scant) present.        Feet:    Feet:      Right foot:      Skin integrity: Erythema and warmth present.      Left foot:      Skin integrity: Erythema, warmth and callus (eschar noted) present.   Lymphadenopathy:      Cervical: No cervical adenopathy.   Skin:     General: Skin is warm.      Coloration: Skin is not pale.      Findings: No rash.   Neurological:      Mental Status: He is alert and oriented to person, place, and time.      Coordination: Coordination normal.   Psychiatric:         Behavior: Behavior normal.         Thought Content: Thought content normal.         LABS:  WBC   Date Value Ref Range Status   04/23/2025 10.39 3.90 - 12.70 K/uL Final     Hemoglobin   Date Value Ref Range Status   03/18/2025 7.4 (L) 14.0 - 18.0 g/dL Final     HGB   Date Value Ref Range Status   04/23/2025 8.7 (L) 14.0 - 18.0 gm/dL Final     Hematocrit   Date Value Ref Range Status   03/18/2025 22.3 (L) 40.0 - 54.0 % Final     HCT   Date Value Ref Range Status   04/23/2025 26.6 (L) 40.0 - 54.0 % Final     Platelet Count   Date Value Ref Range Status   04/23/2025 221 150 - 450 K/uL Final     Platelets   Date Value Ref Range Status   03/18/2025 272 150 - 450 K/uL Final     Gran # (ANC)   Date Value Ref Range Status   03/25/2025 10.5 K/uL Final   03/18/2025 30.3 (H) 1.8 - 7.7 K/uL Final       Chemistry        Component Value Date/Time     04/23/2025 1239     03/18/2025 0428    K 4.5 04/23/2025 1239    K 4.0 03/18/2025 0428     04/23/2025 1239      03/18/2025 0428    CO2 24 04/23/2025 1239    CO2 21 (L) 03/18/2025 0428    BUN 24 (H) 04/23/2025 1239    CREATININE 1.3 04/23/2025 1239    GLU 89 03/18/2025 0428        Component Value Date/Time    CALCIUM 8.7 04/23/2025 1239    CALCIUM 8.1 (L) 03/18/2025 0428    ALKPHOS 68 04/23/2025 1239    ALKPHOS 207 (H) 03/18/2025 0428    AST 25 04/23/2025 1239    AST 68 (H) 03/18/2025 0428    ALT 18 04/23/2025 1239    ALT 40 03/18/2025 0428    BILITOT 0.6 04/23/2025 1239    BILITOT 0.7 03/18/2025 0428        : 104 (4/7/2025)    Vitals:    04/23/25 1309   BP: (!) 145/71   Pulse: 73   Resp: 17   Temp: 98.2 °F (36.8 °C)     Wt Readings from Last 3 Encounters:   04/23/25 76.9 kg (169 lb 8.5 oz)   04/08/25 77.7 kg (171 lb 4.8 oz)   03/26/25 80.9 kg (178 lb 5.6 oz)        Assessment and Plan     1. Malignant neoplasm of body of pancreas  -     CBC w/ DIFF; Future; Expected date: 04/23/2025  -     CMP; Future; Expected date: 04/23/2025  -     Magnesium; Future; Expected date: 04/23/2025    2. Malignant neoplasm metastatic to both lungs    3. Immunodeficiency due to chemotherapy    4. Clostridium difficile colitis  -     CLOSTRIDIOIDES DIFF TOXIN A & B, EIA    5. Upper respiratory tract infection, unspecified type          Route Chart for Scheduling    Med Onc Chart Routing      Follow up with physician . Re-testing for c. diff; Patient will need CBC, CMP and mag prior to seeing ROMANA or Dr. Dunne in one week - treatment was held. He will need to have treatment scheduled after seeing provider in one week.   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, cN0, cM1 noted on 12/26/2024   Line of treatment: First Line  Treatment Goal: Palliative     Upcoming  Treatment Dates - [facility] modified nab-PACLitaxel + gemcitabine Q4W    4/23/2025       Chemotherapy       PACLitaxeL protein-bound (ABRAXANE) 125 mg/m2 = 240 mg in 48 mL infusion       gemcitabine (GEMZAR) 1,950 mg in 0.9% NaCl 269.5 mL chemo infusion       Antiemetics       ondansetron injection 8 mg  5/7/2025       Chemotherapy       PACLitaxeL protein-bound (ABRAXANE) 125 mg/m2 = 240 mg in 48 mL infusion       gemcitabine (GEMZAR) 1,950 mg in 0.9% NaCl 269.5 mL chemo infusion       Antiemetics       ondansetron injection 8 mg  5/21/2025       Chemotherapy       PACLitaxeL protein-bound (ABRAXANE) 125 mg/m2 = 240 mg in 48 mL infusion       gemcitabine (GEMZAR) 1,950 mg in 0.9% NaCl 269.5 mL chemo infusion       Antiemetics       ondansetron injection 8 mg  6/4/2025       Chemotherapy       PACLitaxeL protein-bound (ABRAXANE) 125 mg/m2 = 240 mg in 48 mL infusion       gemcitabine (GEMZAR) 1,950 mg in 0.9% NaCl 269.5 mL chemo infusion       Antiemetics       ondansetron injection 8 mg    Therapy Plan Information  ENTYVIO GI for Ulcerative colitis, noted on 10/20/2022  Medications  vedolizumab (ENTYVIO) 300 mg/250 mL NS IVPB  300 mg, Intravenous, Every 4 weeks  PRN Medications  diphenhydrAMINE injection 25 mg  25 mg, Intravenous, PRN  acetaminophen tablet 650 mg  650 mg, Oral, PRN  albuterol-ipratropium 2.5 mg-0.5 mg/3 mL nebulizer solution 3 mL  3 mL, Nebulization, PRN  methylPREDNISolone sodium succinate injection 40 mg  40 mg, Intravenous, PRN  EPINEPHrine (PF) injection 0.3 mg  0.3 mg, Subcutaneous, PRN  Flushes  0.9% NaCl 250 mL flush bag  Intravenous, Every visit  sodium chloride 0.9% flush 10 mL  10 mL, Intravenous, Every visit  heparin, porcine (PF) 100 unit/mL injection flush 500 Units  500 Units, Intravenous, Every visit  alteplase injection 2 mg  2 mg, Intra-Catheter, Every visit      No therapy plan of the specified type found.    No therapy plan of the specified type found.         Mr. Olson comes in  today for clearance prior to abraxane/gemzar after being held 2/2 c. diff and LE cellulitis. Will delete C3D15 of treatment and resume with C4D1. Will hold treatment until known if still positive for c.diff    #C. Diff colitis - Patient continues to have urgency with bowel movements and diarrhea. States the odor isn't quite as bad but is not back to normal  -Will treat with vancomycin is still persistent  -Hold treatment until results are back     #URI - wheezing noted to left mid lobe  -Patient with worsening SOB  -Recent scans were stable  -Will send in doxycycline 100 mg BID for 7 days (low risk for c.diff)    #Bilateral LE swelling  -Cellulitis resolved  -no redness/minimal swelling  -[facility] when sees [facility]CP on 4/28/25 to address eschar to left toe    #Ulcerative colitis  -He will continue with Entyvio     Visit today included increased complexity associated with the care of the episodic problem palliative chemotherapy addressed and managing the longitudinal care of the patient due to the serious and/or complex managed problem(s) pancreatic cancer    Above plan reviewed with the patient and his son and all questions were answered to their satisfaction    MARIALUISA Fields-MARY  Hematology and Medical Oncology  [facility]  [facility] [place] [facility]    46 minutes of total time spent on the encounter, which includes face to face time and non-face to face time preparing to see the patient (eg, review of tests), Obtaining and/or reviewing separately obtained history, documenting clinical information in the electronic or other health record, independently interpreting results if documented above (not separately reported) and communicating results to the patient/family/caregiver, or Care coordination (not separately reported).

## 2025-04-28 ENCOUNTER — LAB VISIT (OUTPATIENT)
Dept: LAB | Facility: HOSPITAL | Age: 85
End: 2025-04-28
Attending: NURSE PRACTITIONER
Payer: OTHER GOVERNMENT

## 2025-04-28 DIAGNOSIS — R19.7 DIARRHEA OF PRESUMED INFECTIOUS ORIGIN: ICD-10-CM

## 2025-04-28 DIAGNOSIS — R19.7 DIARRHEA OF PRESUMED INFECTIOUS ORIGIN: Primary | ICD-10-CM

## 2025-04-28 LAB
C DIFF GDH STL QL: NEGATIVE
C DIFF TOX A+B STL QL IA: NEGATIVE

## 2025-04-28 PROCEDURE — 87324 CLOSTRIDIUM AG IA: CPT

## 2025-04-29 ENCOUNTER — RESULTS FOLLOW-UP (OUTPATIENT)
Dept: HEMATOLOGY/ONCOLOGY | Facility: CLINIC | Age: 85
End: 2025-04-29

## 2025-04-29 ENCOUNTER — TELEPHONE (OUTPATIENT)
Dept: GASTROENTEROLOGY | Facility: CLINIC | Age: 85
End: 2025-04-29
Payer: OTHER GOVERNMENT

## 2025-04-29 ENCOUNTER — LAB VISIT (OUTPATIENT)
Dept: LAB | Facility: HOSPITAL | Age: 85
End: 2025-04-29
Attending: NURSE PRACTITIONER
Payer: OTHER GOVERNMENT

## 2025-04-29 ENCOUNTER — OFFICE VISIT (OUTPATIENT)
Dept: HEMATOLOGY/ONCOLOGY | Facility: CLINIC | Age: 85
End: 2025-04-29
Payer: OTHER GOVERNMENT

## 2025-04-29 VITALS
HEART RATE: 80 BPM | DIASTOLIC BLOOD PRESSURE: 76 MMHG | RESPIRATION RATE: 16 BRPM | OXYGEN SATURATION: 97 % | BODY MASS INDEX: 25.46 KG/M2 | TEMPERATURE: 100 F | WEIGHT: 168 LBS | HEIGHT: 68 IN | SYSTOLIC BLOOD PRESSURE: 156 MMHG

## 2025-04-29 DIAGNOSIS — C78.01 MALIGNANT NEOPLASM METASTATIC TO BOTH LUNGS: ICD-10-CM

## 2025-04-29 DIAGNOSIS — T45.1X5A IMMUNODEFICIENCY DUE TO CHEMOTHERAPY: ICD-10-CM

## 2025-04-29 DIAGNOSIS — K51.00 ULCERATIVE PANCOLITIS WITHOUT COMPLICATION: ICD-10-CM

## 2025-04-29 DIAGNOSIS — Z79.69 IMMUNODEFICIENCY DUE TO CHEMOTHERAPY: ICD-10-CM

## 2025-04-29 DIAGNOSIS — C25.1 MALIGNANT NEOPLASM OF BODY OF PANCREAS: Primary | ICD-10-CM

## 2025-04-29 DIAGNOSIS — C78.02 MALIGNANT NEOPLASM METASTATIC TO BOTH LUNGS: ICD-10-CM

## 2025-04-29 DIAGNOSIS — D84.821 IMMUNODEFICIENCY DUE TO CHEMOTHERAPY: ICD-10-CM

## 2025-04-29 DIAGNOSIS — C25.1 MALIGNANT NEOPLASM OF BODY OF PANCREAS: ICD-10-CM

## 2025-04-29 LAB
ABSOLUTE EOSINOPHIL (OHS): 0.18 K/UL
ABSOLUTE MONOCYTE (OHS): 0.75 K/UL (ref 0.3–1)
ABSOLUTE NEUTROPHIL COUNT (OHS): 5.85 K/UL (ref 1.8–7.7)
ALBUMIN SERPL BCP-MCNC: 3.7 G/DL (ref 3.5–5.2)
ALP SERPL-CCNC: 60 UNIT/L (ref 40–150)
ALT SERPL W/O P-5'-P-CCNC: 19 UNIT/L (ref 10–44)
ANION GAP (OHS): 7 MMOL/L (ref 8–16)
AST SERPL-CCNC: 27 UNIT/L (ref 11–45)
BASOPHILS # BLD AUTO: 0.03 K/UL
BASOPHILS NFR BLD AUTO: 0.3 %
BILIRUB SERPL-MCNC: 0.9 MG/DL (ref 0.1–1)
BUN SERPL-MCNC: 31 MG/DL (ref 8–23)
CALCIUM SERPL-MCNC: 9.2 MG/DL (ref 8.7–10.5)
CHLORIDE SERPL-SCNC: 108 MMOL/L (ref 95–110)
CO2 SERPL-SCNC: 24 MMOL/L (ref 23–29)
CREAT SERPL-MCNC: 1.5 MG/DL (ref 0.5–1.4)
ERYTHROCYTE [DISTWIDTH] IN BLOOD BY AUTOMATED COUNT: 15 % (ref 11.5–14.5)
GFR SERPLBLD CREATININE-BSD FMLA CKD-EPI: 46 ML/MIN/1.73/M2
GLUCOSE SERPL-MCNC: 103 MG/DL (ref 70–110)
HCT VFR BLD AUTO: 28 % (ref 40–54)
HGB BLD-MCNC: 9.2 GM/DL (ref 14–18)
IMM GRANULOCYTES # BLD AUTO: 0.02 K/UL (ref 0–0.04)
IMM GRANULOCYTES NFR BLD AUTO: 0.2 % (ref 0–0.5)
LYMPHOCYTES # BLD AUTO: 2.69 K/UL (ref 1–4.8)
MAGNESIUM SERPL-MCNC: 1.7 MG/DL (ref 1.6–2.6)
MCH RBC QN AUTO: 31.5 PG (ref 27–31)
MCHC RBC AUTO-ENTMCNC: 32.9 G/DL (ref 32–36)
MCV RBC AUTO: 96 FL (ref 82–98)
NUCLEATED RBC (/100WBC) (OHS): 0 /100 WBC
PLATELET # BLD AUTO: 230 K/UL (ref 150–450)
PMV BLD AUTO: 10 FL (ref 9.2–12.9)
POTASSIUM SERPL-SCNC: 5.1 MMOL/L (ref 3.5–5.1)
PROT SERPL-MCNC: 7.4 GM/DL (ref 6–8.4)
RBC # BLD AUTO: 2.92 M/UL (ref 4.6–6.2)
RELATIVE EOSINOPHIL (OHS): 1.9 %
RELATIVE LYMPHOCYTE (OHS): 28.3 % (ref 18–48)
RELATIVE MONOCYTE (OHS): 7.9 % (ref 4–15)
RELATIVE NEUTROPHIL (OHS): 61.4 % (ref 38–73)
SODIUM SERPL-SCNC: 139 MMOL/L (ref 136–145)
WBC # BLD AUTO: 9.52 K/UL (ref 3.9–12.7)

## 2025-04-29 PROCEDURE — 99999 PR PBB SHADOW E&M-EST. PATIENT-LVL V: CPT | Mod: PBBFAC,,, | Performed by: INTERNAL MEDICINE

## 2025-04-29 PROCEDURE — 85025 COMPLETE CBC W/AUTO DIFF WBC: CPT | Mod: PN

## 2025-04-29 PROCEDURE — 83735 ASSAY OF MAGNESIUM: CPT | Mod: PN

## 2025-04-29 PROCEDURE — 84075 ASSAY ALKALINE PHOSPHATASE: CPT | Mod: PN

## 2025-04-29 PROCEDURE — 99214 OFFICE O/P EST MOD 30 MIN: CPT | Mod: S$PBB,,, | Performed by: INTERNAL MEDICINE

## 2025-04-29 PROCEDURE — G2211 COMPLEX E/M VISIT ADD ON: HCPCS | Mod: S$PBB,,, | Performed by: INTERNAL MEDICINE

## 2025-04-29 PROCEDURE — 99215 OFFICE O/P EST HI 40 MIN: CPT | Mod: PBBFAC,PN | Performed by: INTERNAL MEDICINE

## 2025-04-29 PROCEDURE — 36415 COLL VENOUS BLD VENIPUNCTURE: CPT | Mod: PN

## 2025-04-29 RX ORDER — SODIUM CHLORIDE 50 MG/ML
SOLUTION/ DROPS OPHTHALMIC
COMMUNITY
Start: 2025-04-28

## 2025-04-29 RX ORDER — LATANOPROST/PF 0.005 %
DROPS OPHTHALMIC (EYE)
COMMUNITY
Start: 2025-04-28

## 2025-04-29 RX ORDER — KETOCONAZOLE 20 MG/G
CREAM TOPICAL
COMMUNITY
Start: 2025-04-28

## 2025-04-29 RX ORDER — FAMOTIDINE 40 MG/1
40 TABLET, FILM COATED ORAL
COMMUNITY
Start: 2025-04-28

## 2025-04-29 RX ORDER — FOLIC ACID 1 MG/1
1 TABLET ORAL
COMMUNITY
Start: 2025-04-28

## 2025-04-29 RX ORDER — FLUOROURACIL 50 MG/G
CREAM TOPICAL
COMMUNITY
Start: 2025-04-28

## 2025-04-29 NOTE — PROGRESS NOTES
Subjective     Patient ID: Alfie Olson is a 84 y.o. male.    Chief Complaint: Follow-up (Malignant neoplasm of body of pancreas) and Pancreatic Cancer  Mr. Olson underwent a CT of the abdomen 11/22/2024 for ulcerative colitis follow-up, which incidentally revealed new findings in the lower portion of the lungs. 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 CT of the chest from 2019 shows pleural-based nodules with calcifications consistent with asbestos-related pleural plaques.     Mr. Olson has ulcerative colitis managed with Entyvio infusions at the clinic. He also reports prior diagnoses of COPD and asthma, controlled with an inhaler. His cardiac history includes one stent placed 2-3 years ago, for which he takes Plavix. [facility]     CT chest on 11/27/2024: Interval development of numerous spiculated pulmonary nodules and masses scattered throughout the bilateral lungs.  Findings are highly concerning for malignancy/metastatic disease, however differential also includes septic emboli.  Recommend correlation with tissue sampling.  2. Redemonstration of scattered bilateral calcified and noncalcified pleural plaques.  3. Mild bilateral peripheral predominant interstitial reticulation and ground-glass density suggestive for interstitial lung disease.  4. Trace left pleural fluid.  5. Several hypodense lesions in the pancreatic body/tail which could represent cysts or cystic neoplasm.  Recommend further evaluation with contrast enhanced MRI MRCP pancreatic mass protocol.     PET scan on 11/27/2024: . Innumerable pleural and parenchymal bilateral lung nodules with FDG uptake suggestive of metastatic disease or mesothelioma.  2. Nodule in the left axilla subcutaneous tissues with low level uptake is indeterminate.  Direct visualization warranted.  3. Partially calcified  "mildly enlarged mediastinal lymph nodes with low level uptake are favored to be inflammatory.     MRI/MRCP on 12/11/2024: Probable pancreatic pseudocyst or side duct ectasia in the pancreatic tail.  No suspicious pancreatic lesions.     He underwent biopsy on 12/12/2024. Pathology reveals LINGULA, BIOPSY:--POSITIVE FOR ADENOCARCINOMA.      2. LUNG, RIGHT MIDDLE LOBE NODULE, BIOPSY:--POSITIVE FOR ADENOCARCINOMA.       Comment: The morphologic and immunophenotypic features are not entirely specific    but are most suggestive of pancreaticobiliary primary. Immunohistochemistry was performed with appropriate controls on block 1A based on the histopathologic findings and the results are summarized as  follows:     CK19: Positive. : Positive      He went to the ED on 12/29 with hemoptysis. CTA chest on 12/29/24 revealed "A pulmonary embolus is not demonstrated. Multiple densities bilaterally with a differential of pneumonia and metastatic disease. Small left-sided pleural effusion"  He completed oral antibiotics and his hemoptysis resolved      He started palliative chemo with Gemzar and Abraxane on 01/14/2025    He was hospitalized 01/18/2025 for fever of 100.4, no etiology was found for the infection and he was discharged without any antibiotics  He received cycle 1 day 15 of chemotherapy on 01/29/2025              He was hospitalized from 3/16-3/18 for leg cellulitis, last chemo on 3/12/2025     CT CAP from 3/27/2025 reveal "Grossly stable extensive pulmonary metastasis with small bilateral pleural effusions.  2. Mild progression of mediastinal lymphadenopathy which could represent metastasis.  3. No evidence of new distant extra thoracic metastasis.  4. Splenomegaly and prominence of multiple small venous structures throughout the abdomen raising the question of possible portal venous hypertension.  5. Cholelithiasis and continued gallbladder distension similar to prior exams.  No other secondary signs of acute " cholecystitis.  6. Severe diffuse fatty atrophy of the pancreas with stable cystic lesions.  7. Mild wall thickening of the descending and sigmoid colon without significant pericolonic inflammatory changes, nonspecific however could represent mild colitis.  Recommend clinical correlation    His last chemo was on 03/12/2025, treatment on hold due to lower extremity cellulitis and C diff infection  He completed Fidaxomicin for C diff but was still having diarrhea at the time of seeing Osvaldo Shea on 04/23/2025    HPI He underwent repeat C diff testing on 04/28/2025 which is negative  He still continues to have diarrhea. He has about 3-5 with initial pasty stools and then becomes loose/watery   He also notes abdomen cramping. He is compliant with creon about 3 times a day     Entyvio last dose was 3/13/2025    Review of Systems   Constitutional:  Negative for appetite change, fatigue and unexpected weight change.   HENT:  Negative for mouth sores.    Eyes:  Negative for visual disturbance.   Respiratory:  Negative for cough and shortness of breath.    Cardiovascular:  Negative for chest pain.   Gastrointestinal:  Positive for diarrhea. Negative for abdominal pain.   Genitourinary:  Negative for frequency.   Musculoskeletal:  Negative for back pain.   Integumentary:  Negative for rash.   Neurological:  Negative for headaches.   Hematological:  Negative for adenopathy.   Psychiatric/Behavioral:  The patient is not nervous/anxious.    All other systems reviewed and are negative.         Objective     Physical Exam  Vitals reviewed.   Constitutional:       Appearance: He is well-developed.   HENT:      Mouth/Throat:      Pharynx: No oropharyngeal exudate.   Cardiovascular:      Rate and Rhythm: Normal rate.      Heart sounds: Normal heart sounds.   Pulmonary:      Effort: Pulmonary effort is normal.      Breath sounds: Normal breath sounds. No wheezing.   Abdominal:      General: Bowel sounds are normal.      Palpations:  Abdomen is soft.      Tenderness: There is no abdominal tenderness.   Musculoskeletal:         General: No tenderness.   Lymphadenopathy:      Cervical: No cervical adenopathy.   Skin:     General: Skin is warm and dry.      Findings: No rash.   Neurological:      Mental Status: He is alert and oriented to person, place, and time.      Coordination: Coordination normal.   Psychiatric:         Thought Content: Thought content normal.         Judgment: Judgment normal.              LABS:  WBC   Date Value Ref Range Status   2025 9.52 3.90 - 12.70 K/uL Final     Hemoglobin   Date Value Ref Range Status   2025 7.4 (L) 14.0 - 18.0 g/dL Final     HGB   Date Value Ref Range Status   2025 9.2 (L) 14.0 - 18.0 gm/dL Final     Hematocrit   Date Value Ref Range Status   2025 22.3 (L) 40.0 - 54.0 % Final     HCT   Date Value Ref Range Status   2025 28.0 (L) 40.0 - 54.0 % Final     Platelet Count   Date Value Ref Range Status   2025 230 150 - 450 K/uL Final     Platelets   Date Value Ref Range Status   2025 272 150 - 450 K/uL Final     Gran # (ANC)   Date Value Ref Range Status   2025 10.5 K/uL Final   2025 30.3 (H) 1.8 - 7.7 K/uL Final       Chemistry        Component Value Date/Time     2025 1327     2025 0428    K 5.1 2025 1327    K 4.0 2025 0428     2025 1327     2025 0428    CO2 24 2025 1327    CO2 21 (L) 2025 0428    BUN 31 (H) 2025 1327    CREATININE 1.5 (H) 2025 1327     2025 1327    GLU 89 2025 0428        Component Value Date/Time    CALCIUM 9.2 2025 1327    CALCIUM 8.1 (L) 2025 0428    ALKPHOS 60 2025 1327    ALKPHOS 207 (H) 2025 0428    AST 27 2025 1327    AST 68 (H) 2025 0428    ALT 19 2025 1327    ALT 40 2025 0428    BILITOT 0.9 2025 1327    BILITOT 0.7 20258        Ma.7    Assessment and Plan      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 Malignant neoplasm metastatic to both lungs    3. Immunodeficiency due to chemotherapy    4. Ulcerative pancolitis without complication        Route Chart for Scheduling    Med Onc Chart Routing  Urgent    Follow up with physician . Cancel chemo tomorrow. Cancel me and labs on 5/5/ On 5/13 schedule CBC, CMP and see me,. 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, cN0, cM1 noted on 12/26/2024   Line of treatment: First Line  Treatment Goal: Palliative     Upcoming Treatment Dates - OP GI modified nab-PACLitaxel + gemcitabine Q4W    4/30/2025       Chemotherapy       PACLitaxeL protein-bound (ABRAXANE) 125 mg/m2 = 240 mg in 48 mL infusion       gemcitabine (GEMZAR) 1,910 mg in 0.9% NaCl 269.1 mL chemo infusion       Antiemetics       ondansetron injection 8 mg  5/14/2025       Chemotherapy       PACLitaxeL protein-bound (ABRAXANE) 125 mg/m2 = 240 mg in 48 mL infusion       gemcitabine (GEMZAR) 1,910 mg in 0.9% NaCl 269.1 mL chemo infusion       Antiemetics       ondansetron injection 8 mg  5/28/2025       Chemotherapy       PACLitaxeL protein-bound (ABRAXANE) 125 mg/m2 = 240 mg in 48 mL infusion       gemcitabine (GEMZAR) 1,910 mg in 0.9% NaCl 269.1 mL chemo infusion       Antiemetics       ondansetron injection 8 mg  6/11/2025       Chemotherapy       PACLitaxeL protein-bound (ABRAXANE) 125 mg/m2 = 240 mg in 48 mL infusion       gemcitabine (GEMZAR) 1,910 mg in 0.9% NaCl 269.1 mL chemo infusion       Antiemetics       ondansetron injection 8 mg    Therapy Plan Information  ENTYVIO GI for Ulcerative colitis, noted on  10/20/2022  Medications  vedolizumab (ENTYVIO) 300 mg/250 mL NS IVPB  300 mg, Intravenous, Every 4 weeks  PRN Medications  diphenhydrAMINE injection 25 mg  25 mg, Intravenous, PRN  acetaminophen tablet 650 mg  650 mg, Oral, PRN  albuterol-ipratropium 2.5 mg-0.5 mg/3 mL nebulizer solution 3 mL  3 mL, Nebulization, PRN  methylPREDNISolone sodium succinate injection 40 mg  40 mg, Intravenous, PRN  EPINEPHrine (PF) injection 0.3 mg  0.3 mg, Subcutaneous, PRN  Flushes  0.9% NaCl 250 mL flush bag  Intravenous, Every visit  sodium chloride 0.9% flush 10 mL  10 mL, Intravenous, Every visit  heparin, porcine (PF) 100 unit/mL injection flush 500 Units  500 Units, Intravenous, Every visit  alteplase injection 2 mg  2 mg, Intra-Catheter, Every visit      No therapy plan of the specified type found.    No therapy plan of the specified type found.         Mr. Olson comes in today for his scheduled chemotherapy with Gemzar and Abraxane, he however notes feeling fatigued and would like to continue his break for another 2 weeks which is reasonable.  I will reassess him in 2 weeks for chemotherapy    Ulcerative colitis:  He is currently on Entyvio once a month which he feels is not helping but we will reach out to his gastroenterologist for further guidance and management    Visit today included increased complexity associated with the care of the episodic problem palliative  addressed and managing the longitudinal care of the patient due to the serious and/or complex managed problem(s)     Above care plan was discussed with patient and all questions were addressed to his satisfaction

## 2025-04-29 NOTE — TELEPHONE ENCOUNTER
----- Message from Nona sent at 4/29/2025  3:32 PM CDT -----  Name of Who is Calling:ZORAN JACOBO [[id number]]  What is the request in detail:Pt requesting a call back from the office today if possible.  Can the clinic reply by [facility]:Call  What Number to Call Back if not in [facility]:Telephone Information:[facility]          [phone]

## 2025-05-05 ENCOUNTER — OFFICE VISIT (OUTPATIENT)
Dept: HEMATOLOGY/ONCOLOGY | Facility: CLINIC | Age: 85
End: 2025-05-05
Payer: OTHER GOVERNMENT

## 2025-05-05 ENCOUNTER — LAB VISIT (OUTPATIENT)
Dept: LAB | Facility: HOSPITAL | Age: 85
End: 2025-05-05
Attending: INTERNAL MEDICINE
Payer: OTHER GOVERNMENT

## 2025-05-05 DIAGNOSIS — C78.01 MALIGNANT NEOPLASM METASTATIC TO BOTH LUNGS: ICD-10-CM

## 2025-05-05 DIAGNOSIS — C78.02 MALIGNANT NEOPLASM METASTATIC TO BOTH LUNGS: ICD-10-CM

## 2025-05-05 DIAGNOSIS — C25.1 MALIGNANT NEOPLASM OF BODY OF PANCREAS: ICD-10-CM

## 2025-05-05 DIAGNOSIS — K51.00 ULCERATIVE PANCOLITIS WITHOUT COMPLICATION: ICD-10-CM

## 2025-05-05 DIAGNOSIS — C25.1 MALIGNANT NEOPLASM OF BODY OF PANCREAS: Primary | ICD-10-CM

## 2025-05-05 LAB
ABSOLUTE NEUTROPHIL COUNT (OHS): 4.99 K/UL (ref 1.8–7.7)
ALBUMIN SERPL BCP-MCNC: 3.8 G/DL (ref 3.5–5.2)
ALP SERPL-CCNC: 64 UNIT/L (ref 40–150)
ALT SERPL W/O P-5'-P-CCNC: 20 UNIT/L (ref 10–44)
ANION GAP (OHS): 8 MMOL/L (ref 8–16)
AST SERPL-CCNC: 27 UNIT/L (ref 11–45)
BILIRUB SERPL-MCNC: 0.7 MG/DL (ref 0.1–1)
BUN SERPL-MCNC: 26 MG/DL (ref 8–23)
CALCIUM SERPL-MCNC: 9.4 MG/DL (ref 8.7–10.5)
CHLORIDE SERPL-SCNC: 107 MMOL/L (ref 95–110)
CO2 SERPL-SCNC: 23 MMOL/L (ref 23–29)
CREAT SERPL-MCNC: 1.3 MG/DL (ref 0.5–1.4)
ERYTHROCYTE [DISTWIDTH] IN BLOOD BY AUTOMATED COUNT: 14.6 % (ref 11.5–14.5)
GFR SERPLBLD CREATININE-BSD FMLA CKD-EPI: 55 ML/MIN/1.73/M2
GLUCOSE SERPL-MCNC: 100 MG/DL (ref 70–110)
HCT VFR BLD AUTO: 29.9 % (ref 40–54)
HGB BLD-MCNC: 9.9 GM/DL (ref 14–18)
IMM GRANULOCYTES # BLD AUTO: 0.02 K/UL (ref 0–0.04)
MAGNESIUM SERPL-MCNC: 1.9 MG/DL (ref 1.6–2.6)
MCH RBC QN AUTO: 31.8 PG (ref 27–31)
MCHC RBC AUTO-ENTMCNC: 33.1 G/DL (ref 32–36)
MCV RBC AUTO: 96 FL (ref 82–98)
PLATELET # BLD AUTO: 235 K/UL (ref 150–450)
PMV BLD AUTO: 10.3 FL (ref 9.2–12.9)
POTASSIUM SERPL-SCNC: 4.7 MMOL/L (ref 3.5–5.1)
PROT SERPL-MCNC: 7.6 GM/DL (ref 6–8.4)
RBC # BLD AUTO: 3.11 M/UL (ref 4.6–6.2)
SODIUM SERPL-SCNC: 138 MMOL/L (ref 136–145)
WBC # BLD AUTO: 9.34 K/UL (ref 3.9–12.7)

## 2025-05-05 PROCEDURE — 82947 ASSAY GLUCOSE BLOOD QUANT: CPT | Mod: PN

## 2025-05-05 PROCEDURE — 85027 COMPLETE CBC AUTOMATED: CPT | Mod: PN

## 2025-05-05 PROCEDURE — 36415 COLL VENOUS BLD VENIPUNCTURE: CPT | Mod: PN

## 2025-05-05 PROCEDURE — 83735 ASSAY OF MAGNESIUM: CPT | Mod: PN

## 2025-05-05 NOTE — PROGRESS NOTES
Subjective     Patient ID: Alfie Olson is a 84 y.o. male.  The patient location is: home  The chief complaint leading to consultation is: pancreatic cancer    Visit type: audiovisual      Notes:    Chief Complaint: Malignant neoplasm of body of pancreas  Mr. Olson underwent a CT of the abdomen 11/22/2024 for ulcerative colitis follow-up, which incidentally revealed new findings in the lower portion of the lungs. 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 CT of the chest from 2019 shows pleural-based nodules with calcifications consistent with asbestos-related pleural plaques.     Mr. Olson has ulcerative colitis managed with Entyvio infusions at the clinic. He also reports prior diagnoses of COPD and asthma, controlled with an inhaler. His cardiac history includes one stent placed 2-3 years ago, for which he takes Plavix. [facility]     CT chest on 11/27/2024: Interval development of numerous spiculated pulmonary nodules and masses scattered throughout the bilateral lungs.  Findings are highly concerning for malignancy/metastatic disease, however differential also includes septic emboli.  Recommend correlation with tissue sampling.  2. Redemonstration of scattered bilateral calcified and noncalcified pleural plaques.  3. Mild bilateral peripheral predominant interstitial reticulation and ground-glass density suggestive for interstitial lung disease.  4. Trace left pleural fluid.  5. Several hypodense lesions in the pancreatic body/tail which could represent cysts or cystic neoplasm.  Recommend further evaluation with contrast enhanced MRI MRCP pancreatic mass protocol.     PET scan on 11/27/2024: . Innumerable pleural and parenchymal bilateral lung nodules with FDG uptake suggestive of metastatic disease or mesothelioma.  2. Nodule in the left axilla subcutaneous tissues  "with low level uptake is indeterminate.  Direct visualization warranted.  3. Partially calcified mildly enlarged mediastinal lymph nodes with low level uptake are favored to be inflammatory.     MRI/MRCP on 12/11/2024: Probable pancreatic pseudocyst or side duct ectasia in the pancreatic tail.  No suspicious pancreatic lesions.     He underwent biopsy on 12/12/2024. Pathology reveals LINGULA, BIOPSY:--POSITIVE FOR ADENOCARCINOMA.      2. LUNG, RIGHT MIDDLE LOBE NODULE, BIOPSY:--POSITIVE FOR ADENOCARCINOMA.       Comment: The morphologic and immunophenotypic features are not entirely specific    but are most suggestive of pancreaticobiliary primary. Immunohistochemistry was performed with appropriate controls on block 1A based on the histopathologic findings and the results are summarized as  follows:     CK19: Positive. : Positive      He went to the ED on 12/29 with hemoptysis. CTA chest on 12/29/24 revealed "A pulmonary embolus is not demonstrated. Multiple densities bilaterally with a differential of pneumonia and metastatic disease. Small left-sided pleural effusion"  He completed oral antibiotics and his hemoptysis resolved      He started palliative chemo with Gemzar and Abraxane on 01/14/2025    He was hospitalized 01/18/2025 for fever of 100.4, no etiology was found for the infection and he was discharged without any antibiotics  He received cycle 1 day 15 of chemotherapy on 01/29/2025              He was hospitalized from 3/16-3/18 for leg cellulitis, last chemo on 3/12/2025     CT CAP from 3/27/2025 reveal "Grossly stable extensive pulmonary metastasis with small bilateral pleural effusions.  2. Mild progression of mediastinal lymphadenopathy which could represent metastasis.  3. No evidence of new distant extra thoracic metastasis.  4. Splenomegaly and prominence of multiple small venous structures throughout the abdomen raising the question of possible portal venous hypertension.  5. Cholelithiasis " and continued gallbladder distension similar to prior exams.  No other secondary signs of acute cholecystitis.  6. Severe diffuse fatty atrophy of the pancreas with stable cystic lesions.  7. Mild wall thickening of the descending and sigmoid colon without significant pericolonic inflammatory changes, nonspecific however could represent mild colitis.  Recommend clinical correlation     His last chemo was on 03/12/2025, treatment on hold due to lower extremity cellulitis and C diff infection  He completed Fidaxomicin for C diff but was still having diarrhea at the time of seeing Osvaldo Shea on 04/23/2025     He underwent repeat C diff testing on 04/28/2025 which was negative       Entyvio last dose was 3/13/2025     HPIHe notes his stools are better not as foul smelling    He denies any new issues      Review of Systems   Constitutional:  Negative for appetite change, fatigue and unexpected weight change.   HENT:  Negative for mouth sores.    Eyes:  Negative for visual disturbance.   Respiratory:  Negative for cough and shortness of breath.    Cardiovascular:  Negative for chest pain.   Gastrointestinal:  Negative for abdominal pain and diarrhea.   Genitourinary:  Negative for frequency.   Musculoskeletal:  Negative for back pain.   Integumentary:  Negative for rash.   Neurological:  Negative for headaches.   Hematological:  Negative for adenopathy.   Psychiatric/Behavioral:  The patient is not nervous/anxious.    All other systems reviewed and are negative.         Objective     Physical Exam         LABS:  WBC   Date Value Ref Range Status   05/05/2025 9.34 3.90 - 12.70 K/uL Final     Hemoglobin   Date Value Ref Range Status   03/18/2025 7.4 (L) 14.0 - 18.0 g/dL Final     HGB   Date Value Ref Range Status   05/05/2025 9.9 (L) 14.0 - 18.0 gm/dL Final     Hematocrit   Date Value Ref Range Status   03/18/2025 22.3 (L) 40.0 - 54.0 % Final     HCT   Date Value Ref Range Status   05/05/2025 29.9 (L) 40.0 - 54.0 % Final      Platelet Count   Date Value Ref Range Status   2025 235 150 - 450 K/uL Final     Platelets   Date Value Ref Range Status   2025 272 150 - 450 K/uL Final     Gran # (ANC)   Date Value Ref Range Status   2025 10.5 K/uL Final   2025 30.3 (H) 1.8 - 7.7 K/uL Final       Chemistry        Component Value Date/Time     2025 0832     2025 0428    K 4.7 2025 0832    K 4.0 2025 0428     2025 0832     2025 0428    CO2 23 2025 0832    CO2 21 (L) 2025 0428    BUN 26 (H) 2025 0832    CREATININE 1.3 2025 0832     2025 0832    GLU 89 2025 0428        Component Value Date/Time    CALCIUM 9.4 2025 0832    CALCIUM 8.1 (L) 2025 0428    ALKPHOS 64 2025 0832    ALKPHOS 207 (H) 2025 0428    AST 27 2025 0832    AST 68 (H) 2025 0428    ALT 20 2025 0832    ALT 40 2025 0428    BILITOT 0.7 2025 0832    BILITOT 0.7 2025 0428        Ma.9        Assessment and Plan     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 Malignant neoplasm metastatic to both lungs    3. Ulcerative pancolitis without complication        Route Chart for Scheduling    Med Onc Chart Routing      Follow up with physician . As scheduled   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, cN0, cM1 noted on 2024   Line of treatment: First Line  Treatment Goal: Palliative     Upcoming Treatment Dates - OP GI modified nab-PACLitaxel + gemcitabine Q4W    2025       Chemotherapy       PACLitaxeL protein-bound (ABRAXANE) 125 mg/m2 = 240 mg in 48 mL infusion       gemcitabine (GEMZAR) 1,910 mg in 0.9% NaCl  269.1 mL chemo infusion       Antiemetics       ondansetron injection 8 mg  5/14/2025       Chemotherapy       PACLitaxeL protein-bound (ABRAXANE) 125 mg/m2 = 240 mg in 48 mL infusion       gemcitabine (GEMZAR) 1,910 mg in 0.9% NaCl 269.1 mL chemo infusion       Antiemetics       ondansetron injection 8 mg  5/28/2025       Chemotherapy       PACLitaxeL protein-bound (ABRAXANE) 125 mg/m2 = 240 mg in 48 mL infusion       gemcitabine (GEMZAR) 1,910 mg in 0.9% NaCl 269.1 mL chemo infusion       Antiemetics       ondansetron injection 8 mg  6/11/2025       Chemotherapy       PACLitaxeL protein-bound (ABRAXANE) 125 mg/m2 = 240 mg in 48 mL infusion       gemcitabine (GEMZAR) 1,910 mg in 0.9% NaCl 269.1 mL chemo infusion       Antiemetics       ondansetron injection 8 mg    Therapy Plan Information  ENTYVIO GI for Ulcerative colitis, noted on 10/20/2022  Medications  vedolizumab (ENTYVIO) 300 mg/250 mL NS IVPB  300 mg, Intravenous, Every 4 weeks  PRN Medications  diphenhydrAMINE injection 25 mg  25 mg, Intravenous, PRN  acetaminophen tablet 650 mg  650 mg, Oral, PRN  albuterol-ipratropium 2.5 mg-0.5 mg/3 mL nebulizer solution 3 mL  3 mL, Nebulization, PRN  methylPREDNISolone sodium succinate injection 40 mg  40 mg, Intravenous, PRN  EPINEPHrine (PF) injection 0.3 mg  0.3 mg, Subcutaneous, PRN  Flushes  0.9% NaCl 250 mL flush bag  Intravenous, Every visit  sodium chloride 0.9% flush 10 mL  10 mL, Intravenous, Every visit  heparin, porcine (PF) 100 unit/mL injection flush 500 Units  500 Units, Intravenous, Every visit  alteplase injection 2 mg  2 mg, Intra-Catheter, Every visit      No therapy plan of the specified type found.    No therapy plan of the specified type found.       Mr. Olson comes in to review his labs showed essentially stable, he seems to have improved as far as his fatigue is concerned  I will plan on seeing him back next week to resume chemotherapy    Ulcerative colitis:  He is going to discuss with his  GI doctor about continuation of Entyvio      Visit today included increased complexity associated with the care of the episodic problem palliative chemotherapy addressed and managing the longitudinal care of the patient due to the serious and/or complex managed problem(s) pancreatic cancer    Above care plan was discussed with patient and all questions were addressed to his satisfaction

## 2025-05-12 NOTE — PROGRESS NOTES
Subjective     Patient ID: Alfie Olson is a 84 y.o. male.    Chief Complaint: Follow-up (Malignant neoplasm of body of pancreas/Tx clearance w labs)  Mr. Olson underwent a CT of the abdomen 11/22/2024 for ulcerative colitis follow-up, which incidentally revealed new findings in the lower portion of the lungs. 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 CT of the chest from 2019 shows pleural-based nodules with calcifications consistent with asbestos-related pleural plaques.     Mr. Olson has ulcerative colitis managed with Entyvio infusions at the clinic. He also reports prior diagnoses of COPD and asthma, controlled with an inhaler. His cardiac history includes one stent placed 2-3 years ago, for which he takes Plavix. [facility]     CT chest on 11/27/2024: Interval development of numerous spiculated pulmonary nodules and masses scattered throughout the bilateral lungs.  Findings are highly concerning for malignancy/metastatic disease, however differential also includes septic emboli.  Recommend correlation with tissue sampling.  2. Redemonstration of scattered bilateral calcified and noncalcified pleural plaques.  3. Mild bilateral peripheral predominant interstitial reticulation and ground-glass density suggestive for interstitial lung disease.  4. Trace left pleural fluid.  5. Several hypodense lesions in the pancreatic body/tail which could represent cysts or cystic neoplasm.  Recommend further evaluation with contrast enhanced MRI MRCP pancreatic mass protocol.     PET scan on 11/27/2024: . Innumerable pleural and parenchymal bilateral lung nodules with FDG uptake suggestive of metastatic disease or mesothelioma.  2. Nodule in the left axilla subcutaneous tissues with low level uptake is indeterminate.  Direct visualization warranted.  3. Partially calcified mildly  "enlarged mediastinal lymph nodes with low level uptake are favored to be inflammatory.     MRI/MRCP on 12/11/2024: Probable pancreatic pseudocyst or side duct ectasia in the pancreatic tail.  No suspicious pancreatic lesions.     He underwent biopsy on 12/12/2024. Pathology reveals LINGULA, BIOPSY:--POSITIVE FOR ADENOCARCINOMA.      2. LUNG, RIGHT MIDDLE LOBE NODULE, BIOPSY:--POSITIVE FOR ADENOCARCINOMA.       Comment: The morphologic and immunophenotypic features are not entirely specific    but are most suggestive of pancreaticobiliary primary. Immunohistochemistry was performed with appropriate controls on block 1A based on the histopathologic findings and the results are summarized as  follows:     CK19: Positive. : Positive      He went to the ED on 12/29 with hemoptysis. CTA chest on 12/29/24 revealed "A pulmonary embolus is not demonstrated. Multiple densities bilaterally with a differential of pneumonia and metastatic disease. Small left-sided pleural effusion"  He completed oral antibiotics and his hemoptysis resolved      He started palliative chemo with Gemzar and Abraxane on 01/14/2025    He was hospitalized 01/18/2025 for fever of 100.4, no etiology was found for the infection and he was discharged without any antibiotics  He received cycle 1 day 15 of chemotherapy on 01/29/2025              He was hospitalized from 3/16-3/18 for leg cellulitis, last chemo on 3/12/2025     CT CAP from 3/27/2025 reveal "Grossly stable extensive pulmonary metastasis with small bilateral pleural effusions.  2. Mild progression of mediastinal lymphadenopathy which could represent metastasis.  3. No evidence of new distant extra thoracic metastasis.  4. Splenomegaly and prominence of multiple small venous structures throughout the abdomen raising the question of possible portal venous hypertension.  5. Cholelithiasis and continued gallbladder distension similar to prior exams.  No other secondary signs of acute " cholecystitis.  6. Severe diffuse fatty atrophy of the pancreas with stable cystic lesions.  7. Mild wall thickening of the descending and sigmoid colon without significant pericolonic inflammatory changes, nonspecific however could represent mild colitis.  Recommend clinical correlation     His last chemo was on 03/12/2025, treatment on hold due to lower extremity cellulitis and C diff infection  He completed Fidaxomicin for C diff but was still having diarrhea at the time of seeing Osvaldo Shea on 04/23/2025     He underwent repeat C diff testing on 04/28/2025 which was negative        Entyvio last dose was 3/13/2025, last chemo was on 3/12/2025     HPI He comes in to resume his palliative chemotherapy with Gemzar and Abraxane  He notes his diarrhea is better with imodium He is taking creon   He is now taking imodium after every BM. He had BM twice yesterday and none today     He is here with his friend   Review of Systems   Constitutional:  Negative for appetite change, fatigue and unexpected weight change.   HENT:  Negative for mouth sores.    Eyes:  Negative for visual disturbance.   Respiratory:  Negative for cough and shortness of breath.    Cardiovascular:  Negative for chest pain.   Gastrointestinal:  Positive for diarrhea. Negative for abdominal pain.   Genitourinary:  Negative for frequency.   Musculoskeletal:  Negative for back pain.   Integumentary:  Negative for rash.   Neurological:  Negative for headaches.   Hematological:  Negative for adenopathy.   Psychiatric/Behavioral:  The patient is not nervous/anxious.    All other systems reviewed and are negative.         Objective     Physical Exam         LABS:  WBC   Date Value Ref Range Status   05/13/2025 7.77 3.90 - 12.70 K/uL Final     Hemoglobin   Date Value Ref Range Status   03/18/2025 7.4 (L) 14.0 - 18.0 g/dL Final     HGB   Date Value Ref Range Status   05/13/2025 8.8 (L) 14.0 - 18.0 gm/dL Final     Hematocrit   Date Value Ref Range Status    03/18/2025 22.3 (L) 40.0 - 54.0 % Final     HCT   Date Value Ref Range Status   05/13/2025 26.9 (L) 40.0 - 54.0 % Final     Platelet Count   Date Value Ref Range Status   05/13/2025 218 150 - 450 K/uL Final     Platelets   Date Value Ref Range Status   03/18/2025 272 150 - 450 K/uL Final     Gran # (ANC)   Date Value Ref Range Status   03/25/2025 10.5 K/uL Final   03/18/2025 30.3 (H) 1.8 - 7.7 K/uL Final       Chemistry        Component Value Date/Time     05/13/2025 1313     03/18/2025 0428    K 5.1 05/13/2025 1313    K 4.0 03/18/2025 0428     05/13/2025 1313     03/18/2025 0428    CO2 27 05/13/2025 1313    CO2 21 (L) 03/18/2025 0428    BUN 26 (H) 05/13/2025 1313    CREATININE 1.5 (H) 05/13/2025 1313     (H) 05/13/2025 1313    GLU 89 03/18/2025 0428        Component Value Date/Time    CALCIUM 9.0 05/13/2025 1313    CALCIUM 8.1 (L) 03/18/2025 0428    ALKPHOS 62 05/13/2025 1313    ALKPHOS 207 (H) 03/18/2025 0428    AST 26 05/13/2025 1313    AST 68 (H) 03/18/2025 0428    ALT 21 05/13/2025 1313    ALT 40 03/18/2025 0428    BILITOT 0.5 05/13/2025 1313    BILITOT 0.7 03/18/2025 0428          Assessment and Plan     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 Malignant neoplasm metastatic to both lungs    3. Ulcerative pancolitis without complication    4. Immunodeficiency due to chemotherapy    Other orders  -     pegfilgrastim (NEULASTA (ON BODY INJECTOR)) injection 6 mg  -     ondansetron injection 8 mg  -     PACLitaxeL protein-bound (ABRAXANE) 125 mg/m2 = 240 mg in 48 mL infusion  -     gemcitabine (GEMZAR) 1,910 mg in 0.9% NaCl 269.1 mL chemo infusion  -     prochlorperazine injection 5 mg  -     EPINEPHrine (EPIPEN) 0.3 mg/0.3 mL pen injection 0.3 mg  -     diphenhydrAMINE injection 50 mg  -     hydrocortisone sodium succinate injection 100 mg  -     0.9% NaCl 250 mL flush bag  -     sodium chloride 0.9% flush 10 mL  -     heparin, porcine (PF) 100 unit/mL injection flush 500  Units  -     alteplase injection 2 mg      Route Chart for Scheduling    Med Onc Chart Routing      Follow up with physician . In 4 weeks schedule CBC, CMP and see me and for Gemzar and ABraxane. Labs and me day prior   Follow up with ROMANA . In 2 weeks scheeule CBC, CMP and see ROMANA and for Gemzar and ABraxane. Labs and ROMANA day prior   Infusion scheduling note    Injection scheduling note    Labs    Imaging    Pharmacy appointment    Other referrals                Treatment Plan Information   [facility] modified nab-PACLitaxel + gemcitabine Q4W Lacy Dunne MD   Associated diagnosis: Malignant neoplasm of body of pancreas Stage IV cT1c, cN0, cM1 noted on 12/26/2024   Line of treatment: First Line  Treatment Goal: Palliative     Upcoming Treatment Dates - [facility] modified nab-PACLitaxel + gemcitabine Q4W    5/14/2025       Chemotherapy       PACLitaxeL protein-bound (ABRAXANE) 125 mg/m2 = 240 mg in 48 mL infusion       gemcitabine (GEMZAR) 1,910 mg in 0.9% NaCl 269.1 mL chemo infusion       Antiemetics       ondansetron injection 8 mg  5/28/2025       Chemotherapy       PACLitaxeL protein-bound (ABRAXANE) 125 mg/m2 = 240 mg in 48 mL infusion       gemcitabine (GEMZAR) 1,910 mg in 0.9% NaCl 269.1 mL chemo infusion       Antiemetics       ondansetron injection 8 mg  6/11/2025       Chemotherapy       PACLitaxeL protein-bound (ABRAXANE) 125 mg/m2 = 240 mg in 48 mL infusion       gemcitabine (GEMZAR) 1,910 mg in 0.9% NaCl 269.1 mL chemo infusion       Antiemetics       ondansetron injection 8 mg  6/25/2025       Chemotherapy       PACLitaxeL protein-bound (ABRAXANE) 125 mg/m2 = 240 mg in 48 mL infusion       gemcitabine (GEMZAR) 1,910 mg in 0.9% NaCl 269.1 mL chemo infusion       Antiemetics       ondansetron injection 8 mg    Therapy Plan Information  ENTYVIO GI for Ulcerative colitis, noted on 10/20/2022  Medications  vedolizumab (ENTYVIO) 300 mg/250 mL NS IVPB  300 mg, Intravenous, Every 4 weeks  PRN  Medications  diphenhydrAMINE injection 25 mg  25 mg, Intravenous, PRN  acetaminophen tablet 650 mg  650 mg, Oral, PRN  albuterol-ipratropium 2.5 mg-0.5 mg/3 mL nebulizer solution 3 mL  3 mL, Nebulization, PRN  methylPREDNISolone sodium succinate injection 40 mg  40 mg, Intravenous, PRN  EPINEPHrine (PF) injection 0.3 mg  0.3 mg, Subcutaneous, PRN  Flushes  0.9% NaCl 250 mL flush bag  Intravenous, Every visit  sodium chloride 0.9% flush 10 mL  10 mL, Intravenous, Every visit  heparin, porcine (PF) 100 unit/mL injection flush 500 Units  500 Units, Intravenous, Every visit  alteplase injection 2 mg  2 mg, Intra-Catheter, Every visit      No therapy plan of the specified type found.    No therapy plan of the specified type found.           Mr. Olson will proceed with palliative chemotherapy with Gemzar and Abraxane and return in 2 weeks for next cycle      Visit today included increased complexity associated with the care of the episodic problem palliative chemotherapy addressed and managing the longitudinal care of the patient due to the serious and/or complex managed problem(s) pancreatic cancer    Above care plan was discussed with patient and accompanying friend and all questions were addressed to their satisfaction

## 2025-05-13 ENCOUNTER — OFFICE VISIT (OUTPATIENT)
Dept: HEMATOLOGY/ONCOLOGY | Facility: CLINIC | Age: 85
End: 2025-05-13
Payer: OTHER GOVERNMENT

## 2025-05-13 ENCOUNTER — LAB VISIT (OUTPATIENT)
Dept: LAB | Facility: HOSPITAL | Age: 85
End: 2025-05-13
Attending: INTERNAL MEDICINE
Payer: OTHER GOVERNMENT

## 2025-05-13 VITALS
TEMPERATURE: 99 F | RESPIRATION RATE: 16 BRPM | HEIGHT: 68 IN | OXYGEN SATURATION: 96 % | SYSTOLIC BLOOD PRESSURE: 142 MMHG | HEART RATE: 75 BPM | DIASTOLIC BLOOD PRESSURE: 80 MMHG | BODY MASS INDEX: 25.59 KG/M2 | WEIGHT: 168.88 LBS

## 2025-05-13 DIAGNOSIS — T45.1X5A IMMUNODEFICIENCY DUE TO CHEMOTHERAPY: ICD-10-CM

## 2025-05-13 DIAGNOSIS — C78.02 MALIGNANT NEOPLASM METASTATIC TO BOTH LUNGS: ICD-10-CM

## 2025-05-13 DIAGNOSIS — C25.1 MALIGNANT NEOPLASM OF BODY OF PANCREAS: ICD-10-CM

## 2025-05-13 DIAGNOSIS — C78.01 MALIGNANT NEOPLASM METASTATIC TO BOTH LUNGS: ICD-10-CM

## 2025-05-13 DIAGNOSIS — D84.821 IMMUNODEFICIENCY DUE TO CHEMOTHERAPY: ICD-10-CM

## 2025-05-13 DIAGNOSIS — C25.1 MALIGNANT NEOPLASM OF BODY OF PANCREAS: Primary | ICD-10-CM

## 2025-05-13 DIAGNOSIS — K51.00 ULCERATIVE PANCOLITIS WITHOUT COMPLICATION: ICD-10-CM

## 2025-05-13 DIAGNOSIS — Z79.69 IMMUNODEFICIENCY DUE TO CHEMOTHERAPY: ICD-10-CM

## 2025-05-13 LAB
ABSOLUTE NEUTROPHIL COUNT (OHS): 4.62 K/UL (ref 1.8–7.7)
ALBUMIN SERPL BCP-MCNC: 3.5 G/DL (ref 3.5–5.2)
ALP SERPL-CCNC: 62 UNIT/L (ref 40–150)
ALT SERPL W/O P-5'-P-CCNC: 21 UNIT/L (ref 10–44)
ANION GAP (OHS): 7 MMOL/L (ref 8–16)
AST SERPL-CCNC: 26 UNIT/L (ref 11–45)
BILIRUB SERPL-MCNC: 0.5 MG/DL (ref 0.1–1)
BUN SERPL-MCNC: 26 MG/DL (ref 8–23)
CALCIUM SERPL-MCNC: 9 MG/DL (ref 8.7–10.5)
CHLORIDE SERPL-SCNC: 104 MMOL/L (ref 95–110)
CO2 SERPL-SCNC: 27 MMOL/L (ref 23–29)
CREAT SERPL-MCNC: 1.5 MG/DL (ref 0.5–1.4)
ERYTHROCYTE [DISTWIDTH] IN BLOOD BY AUTOMATED COUNT: 14.3 % (ref 11.5–14.5)
GFR SERPLBLD CREATININE-BSD FMLA CKD-EPI: 46 ML/MIN/1.73/M2
GLUCOSE SERPL-MCNC: 115 MG/DL (ref 70–110)
HCT VFR BLD AUTO: 26.9 % (ref 40–54)
HGB BLD-MCNC: 8.8 GM/DL (ref 14–18)
IMM GRANULOCYTES # BLD AUTO: 0.01 K/UL (ref 0–0.04)
MCH RBC QN AUTO: 31.5 PG (ref 27–31)
MCHC RBC AUTO-ENTMCNC: 32.7 G/DL (ref 32–36)
MCV RBC AUTO: 96 FL (ref 82–98)
PLATELET # BLD AUTO: 218 K/UL (ref 150–450)
PMV BLD AUTO: 9.9 FL (ref 9.2–12.9)
POTASSIUM SERPL-SCNC: 5.1 MMOL/L (ref 3.5–5.1)
PROT SERPL-MCNC: 7.3 GM/DL (ref 6–8.4)
RBC # BLD AUTO: 2.79 M/UL (ref 4.6–6.2)
SODIUM SERPL-SCNC: 138 MMOL/L (ref 136–145)
WBC # BLD AUTO: 7.77 K/UL (ref 3.9–12.7)

## 2025-05-13 PROCEDURE — 36415 COLL VENOUS BLD VENIPUNCTURE: CPT | Mod: PN

## 2025-05-13 PROCEDURE — G2211 COMPLEX E/M VISIT ADD ON: HCPCS | Mod: S$PBB,,, | Performed by: INTERNAL MEDICINE

## 2025-05-13 PROCEDURE — 99215 OFFICE O/P EST HI 40 MIN: CPT | Mod: PBBFAC,PN | Performed by: INTERNAL MEDICINE

## 2025-05-13 PROCEDURE — 99215 OFFICE O/P EST HI 40 MIN: CPT | Mod: S$PBB,,, | Performed by: INTERNAL MEDICINE

## 2025-05-13 PROCEDURE — 80053 COMPREHEN METABOLIC PANEL: CPT | Mod: PN

## 2025-05-13 PROCEDURE — 99999 PR PBB SHADOW E&M-EST. PATIENT-LVL V: CPT | Mod: PBBFAC,,, | Performed by: INTERNAL MEDICINE

## 2025-05-13 PROCEDURE — 85027 COMPLETE CBC AUTOMATED: CPT | Mod: PN

## 2025-05-13 RX ORDER — IPRATROPIUM BROMIDE AND ALBUTEROL SULFATE 2.5; .5 MG/3ML; MG/3ML
3 SOLUTION RESPIRATORY (INHALATION)
Status: CANCELLED | OUTPATIENT
Start: 2025-05-13

## 2025-05-13 RX ORDER — EPINEPHRINE 0.3 MG/.3ML
0.3 INJECTION SUBCUTANEOUS ONCE AS NEEDED
Status: CANCELLED | OUTPATIENT
Start: 2025-05-14

## 2025-05-13 RX ORDER — DIPHENHYDRAMINE HYDROCHLORIDE 50 MG/ML
25 INJECTION, SOLUTION INTRAMUSCULAR; INTRAVENOUS
Status: CANCELLED | OUTPATIENT
Start: 2025-05-13

## 2025-05-13 RX ORDER — SODIUM CHLORIDE 0.9 % (FLUSH) 0.9 %
10 SYRINGE (ML) INJECTION
Status: CANCELLED | OUTPATIENT
Start: 2025-05-14

## 2025-05-13 RX ORDER — HEPARIN 100 UNIT/ML
500 SYRINGE INTRAVENOUS
Status: CANCELLED | OUTPATIENT
Start: 2025-05-13

## 2025-05-13 RX ORDER — EPINEPHRINE 1 MG/ML
0.3 INJECTION, SOLUTION, CONCENTRATE INTRAVENOUS
Status: CANCELLED | OUTPATIENT
Start: 2025-05-13

## 2025-05-13 RX ORDER — ACETAMINOPHEN 325 MG/1
650 TABLET ORAL
Status: CANCELLED | OUTPATIENT
Start: 2025-05-13

## 2025-05-13 RX ORDER — SODIUM CHLORIDE 0.9 % (FLUSH) 0.9 %
10 SYRINGE (ML) INJECTION
Status: CANCELLED | OUTPATIENT
Start: 2025-05-13

## 2025-05-13 RX ORDER — DIPHENHYDRAMINE HYDROCHLORIDE 50 MG/ML
50 INJECTION, SOLUTION INTRAMUSCULAR; INTRAVENOUS ONCE AS NEEDED
Status: CANCELLED | OUTPATIENT
Start: 2025-05-14

## 2025-05-13 RX ORDER — ONDANSETRON HYDROCHLORIDE 2 MG/ML
8 INJECTION, SOLUTION INTRAVENOUS
Status: CANCELLED | OUTPATIENT
Start: 2025-05-14

## 2025-05-13 RX ORDER — PROCHLORPERAZINE EDISYLATE 5 MG/ML
5 INJECTION INTRAMUSCULAR; INTRAVENOUS ONCE AS NEEDED
Status: CANCELLED | OUTPATIENT
Start: 2025-05-14

## 2025-05-13 RX ORDER — CHLORTHALIDONE 25 MG/1
25 TABLET ORAL
COMMUNITY
Start: 2025-05-05

## 2025-05-13 RX ORDER — METHYLPREDNISOLONE SOD SUCC 125 MG
40 VIAL (EA) INJECTION
Status: CANCELLED | OUTPATIENT
Start: 2025-05-13

## 2025-05-13 RX ORDER — HEPARIN 100 UNIT/ML
500 SYRINGE INTRAVENOUS
Status: CANCELLED | OUTPATIENT
Start: 2025-05-14

## 2025-05-14 ENCOUNTER — INFUSION (OUTPATIENT)
Dept: INFUSION THERAPY | Facility: HOSPITAL | Age: 85
End: 2025-05-14
Attending: INTERNAL MEDICINE
Payer: OTHER GOVERNMENT

## 2025-05-14 ENCOUNTER — LAB VISIT (OUTPATIENT)
Dept: LAB | Facility: HOSPITAL | Age: 85
End: 2025-05-14
Payer: OTHER GOVERNMENT

## 2025-05-14 ENCOUNTER — DOCUMENTATION ONLY (OUTPATIENT)
Dept: INFUSION THERAPY | Facility: HOSPITAL | Age: 85
End: 2025-05-14
Payer: OTHER GOVERNMENT

## 2025-05-14 ENCOUNTER — OFFICE VISIT (OUTPATIENT)
Dept: GASTROENTEROLOGY | Facility: CLINIC | Age: 85
End: 2025-05-14
Payer: OTHER GOVERNMENT

## 2025-05-14 ENCOUNTER — TELEPHONE (OUTPATIENT)
Dept: GASTROENTEROLOGY | Facility: CLINIC | Age: 85
End: 2025-05-14
Payer: OTHER GOVERNMENT

## 2025-05-14 VITALS
SYSTOLIC BLOOD PRESSURE: 150 MMHG | TEMPERATURE: 98 F | BODY MASS INDEX: 25.39 KG/M2 | WEIGHT: 167.56 LBS | HEART RATE: 98 BPM | HEIGHT: 68 IN | OXYGEN SATURATION: 98 % | DIASTOLIC BLOOD PRESSURE: 78 MMHG | RESPIRATION RATE: 16 BRPM

## 2025-05-14 VITALS — BODY MASS INDEX: 25.39 KG/M2 | WEIGHT: 167.56 LBS | HEIGHT: 68 IN

## 2025-05-14 DIAGNOSIS — K51.018 ULCERATIVE PANCOLITIS WITH OTHER COMPLICATION: ICD-10-CM

## 2025-05-14 DIAGNOSIS — R10.9 ABDOMINAL CRAMPING: ICD-10-CM

## 2025-05-14 DIAGNOSIS — K21.9 CHRONIC GERD: ICD-10-CM

## 2025-05-14 DIAGNOSIS — K52.9 INFLAMMATORY BOWEL DISEASES (IBD): Primary | ICD-10-CM

## 2025-05-14 DIAGNOSIS — C25.1 MALIGNANT NEOPLASM OF BODY OF PANCREAS: ICD-10-CM

## 2025-05-14 DIAGNOSIS — Z86.19 HISTORY OF CLOSTRIDIUM DIFFICILE INFECTION: ICD-10-CM

## 2025-05-14 DIAGNOSIS — K86.89 PANCREATIC INSUFFICIENCY: ICD-10-CM

## 2025-05-14 DIAGNOSIS — K52.9 CHRONIC DIARRHEA: ICD-10-CM

## 2025-05-14 DIAGNOSIS — K52.9 INFLAMMATORY BOWEL DISEASES (IBD): ICD-10-CM

## 2025-05-14 DIAGNOSIS — C25.1 MALIGNANT NEOPLASM OF BODY OF PANCREAS: Primary | ICD-10-CM

## 2025-05-14 DIAGNOSIS — Z79.01 CURRENT USE OF ANTICOAGULANT THERAPY: ICD-10-CM

## 2025-05-14 LAB — CRP SERPL-MCNC: 5.5 MG/L

## 2025-05-14 PROCEDURE — 86140 C-REACTIVE PROTEIN: CPT

## 2025-05-14 PROCEDURE — 99215 OFFICE O/P EST HI 40 MIN: CPT | Mod: PBBFAC,PO

## 2025-05-14 PROCEDURE — 25000003 PHARM REV CODE 250: Mod: PN | Performed by: INTERNAL MEDICINE

## 2025-05-14 PROCEDURE — 96375 TX/PRO/DX INJ NEW DRUG ADDON: CPT | Mod: PN

## 2025-05-14 PROCEDURE — 99999 PR PBB SHADOW E&M-EST. PATIENT-LVL V: CPT | Mod: PBBFAC,,,

## 2025-05-14 PROCEDURE — 80280 DRUG ASSAY VEDOLIZUMAB: CPT | Mod: PO

## 2025-05-14 PROCEDURE — 96417 CHEMO IV INFUS EACH ADDL SEQ: CPT | Mod: PN

## 2025-05-14 PROCEDURE — 99214 OFFICE O/P EST MOD 30 MIN: CPT | Mod: S$PBB,,,

## 2025-05-14 PROCEDURE — A4216 STERILE WATER/SALINE, 10 ML: HCPCS | Mod: PN | Performed by: INTERNAL MEDICINE

## 2025-05-14 PROCEDURE — 36415 COLL VENOUS BLD VENIPUNCTURE: CPT | Mod: PO

## 2025-05-14 PROCEDURE — 96377 APPLICATON ON-BODY INJECTOR: CPT | Mod: PN

## 2025-05-14 PROCEDURE — 96367 TX/PROPH/DG ADDL SEQ IV INF: CPT | Mod: PN

## 2025-05-14 PROCEDURE — 63600175 PHARM REV CODE 636 W HCPCS: Mod: JZ,TB,PN | Performed by: INTERNAL MEDICINE

## 2025-05-14 PROCEDURE — 96413 CHEMO IV INFUSION 1 HR: CPT | Mod: PN

## 2025-05-14 RX ORDER — SODIUM CHLORIDE 0.9 % (FLUSH) 0.9 %
10 SYRINGE (ML) INJECTION
Status: DISCONTINUED | OUTPATIENT
Start: 2025-05-14 | End: 2025-05-15 | Stop reason: HOSPADM

## 2025-05-14 RX ORDER — HEPARIN 100 UNIT/ML
500 SYRINGE INTRAVENOUS
Status: DISCONTINUED | OUTPATIENT
Start: 2025-05-14 | End: 2025-05-15 | Stop reason: HOSPADM

## 2025-05-14 RX ORDER — METHYLPREDNISOLONE SOD SUCC 125 MG
40 VIAL (EA) INJECTION
OUTPATIENT
Start: 2025-06-04

## 2025-05-14 RX ORDER — DIPHENHYDRAMINE HYDROCHLORIDE 50 MG/ML
50 INJECTION, SOLUTION INTRAMUSCULAR; INTRAVENOUS ONCE AS NEEDED
Status: DISCONTINUED | OUTPATIENT
Start: 2025-05-14 | End: 2025-05-15 | Stop reason: HOSPADM

## 2025-05-14 RX ORDER — SODIUM CHLORIDE 0.9 % (FLUSH) 0.9 %
10 SYRINGE (ML) INJECTION
OUTPATIENT
Start: 2025-06-04

## 2025-05-14 RX ORDER — EPINEPHRINE 0.3 MG/.3ML
0.3 INJECTION SUBCUTANEOUS ONCE AS NEEDED
Status: DISCONTINUED | OUTPATIENT
Start: 2025-05-14 | End: 2025-05-15 | Stop reason: HOSPADM

## 2025-05-14 RX ORDER — HEPARIN 100 UNIT/ML
500 SYRINGE INTRAVENOUS
OUTPATIENT
Start: 2025-06-04

## 2025-05-14 RX ORDER — ONDANSETRON HYDROCHLORIDE 2 MG/ML
8 INJECTION, SOLUTION INTRAVENOUS
Status: COMPLETED | OUTPATIENT
Start: 2025-05-14 | End: 2025-05-14

## 2025-05-14 RX ORDER — EPINEPHRINE 1 MG/ML
0.3 INJECTION, SOLUTION, CONCENTRATE INTRAVENOUS
OUTPATIENT
Start: 2025-06-04

## 2025-05-14 RX ORDER — DIPHENHYDRAMINE HYDROCHLORIDE 50 MG/ML
25 INJECTION, SOLUTION INTRAMUSCULAR; INTRAVENOUS
OUTPATIENT
Start: 2025-06-04

## 2025-05-14 RX ORDER — ACETAMINOPHEN 325 MG/1
650 TABLET ORAL
OUTPATIENT
Start: 2025-06-04

## 2025-05-14 RX ORDER — IPRATROPIUM BROMIDE AND ALBUTEROL SULFATE 2.5; .5 MG/3ML; MG/3ML
3 SOLUTION RESPIRATORY (INHALATION)
OUTPATIENT
Start: 2025-06-04

## 2025-05-14 RX ORDER — PROCHLORPERAZINE EDISYLATE 5 MG/ML
5 INJECTION INTRAMUSCULAR; INTRAVENOUS ONCE AS NEEDED
Status: DISCONTINUED | OUTPATIENT
Start: 2025-05-14 | End: 2025-05-15 | Stop reason: HOSPADM

## 2025-05-14 RX ADMIN — VEDOLIZUMAB 300 MG: 300 INJECTION, POWDER, LYOPHILIZED, FOR SOLUTION INTRAVENOUS at 12:05

## 2025-05-14 RX ADMIN — PACLITAXEL 240 MG: 100 INJECTION, POWDER, LYOPHILIZED, FOR SUSPENSION INTRAVENOUS at 11:05

## 2025-05-14 RX ADMIN — PEGFILGRASTIM 6 MG: KIT SUBCUTANEOUS at 12:05

## 2025-05-14 RX ADMIN — ONDANSETRON 8 MG: 2 INJECTION INTRAMUSCULAR; INTRAVENOUS at 11:05

## 2025-05-14 RX ADMIN — SODIUM CHLORIDE: 9 INJECTION, SOLUTION INTRAVENOUS at 11:05

## 2025-05-14 RX ADMIN — GEMCITABINE 2000 MG: 38 INJECTION, SOLUTION INTRAVENOUS at 12:05

## 2025-05-14 RX ADMIN — SODIUM CHLORIDE, PRESERVATIVE FREE 10 ML: 5 INJECTION INTRAVENOUS at 11:05

## 2025-05-14 NOTE — PROGRESS NOTES
Subjective:       Patient ID: Alfie Olson is a 84 y.o. male Body mass index is 25.48 kg/m².    Chief Complaint: Inflammatory Bowel Disease, Abdominal Cramping, Diarrhea, and Follow-up    Established patient of Dr. Ivory, Dr. Ervin, and Helen Davis, [facility].     Patient following up in clinic to discuss alternative treatment for IBD instead of Entyvio due to uncontrolled chronic diarrhea and abdominal cramping since 2016. He is currently taking Questran b.i.d., Creon (history of pancreatic cancer - recently increased to 2 capsules with meals and 1 capsule with snacks), and Imodium b.i.d., but would like to control symptoms with one medication if possible. Patient usually compliant with Entyvio 300 mg every 4 weeks monotherapy, but skipped April dose due to C diff infection 03/27/2025 treated with vancomycin - last infusion 03/13/25 and next infusion scheduled for today. Colonoscopy 9/1/22 with findings of normal TI, mild inflammation throughout the entire colon. Path with mild chronic active colitis throughout the colon with rare microscopic granulomas and rare non-necrotizing micro granulomas which suspected to be related to potential Crohn's disease of colon. Colonoscopy 07/25/24 with findings of mild activity and poorly formed nonnecrotizing granuloma seen on random colon biopsy, which was negative for evidence of chronicity.  Currently having up to 5 BMs daily rated 4 or 7 on [place] scale.  Patient feels as though Imodium is the most effective at improving diarrhea. Denies nighttime symptoms or hematochezia.    GI Problem  The primary symptoms include fatigue (Attributes fatigue to chemo for pancreatic cancer), abdominal pain and diarrhea. Primary symptoms do not include fever, weight loss, nausea, vomiting, melena, hematemesis, jaundice, hematochezia or dysuria.   The abdominal pain began more than 2 days ago. The abdominal pain has been unchanged (waxes and wans) since its onset. The abdominal pain is  located in the LLQ, suprapubic region and RLQ. The abdominal pain does not radiate. The severity of the abdominal pain is 0/10 (Denies pain currently; reports intermittent abdominal cramping prior to BMs and spontaneous; lasts minutes). The abdominal pain is relieved by bowel movements and passing flatus.   The diarrhea began more than 1 week ago (improved since increase Creon). The diarrhea is watery and semi-solid (Rated stool 4 on [place] scale after taking Imodium, but typically rated 7 on [place] scale; currently taking Imodium b.i.d. and Questran 4 g b.i.d. with short term relief). Daily occurrences: Has between up to 5 BMs daily with fecal urgency without using Imodium; less frequent after taking Imodium. Risk factors for illness producing diarrhea include new medications (Denies suspect food, recent foreign travel).   The illness does not include chills, dysphagia, odynophagia, bloating or constipation. Significant associated medical issues include inflammatory bowel disease (hx of ulcerative colitis diagnosed in 1961) and GERD (well-controlled taking Pepcid 40 mg). Associated medical issues do not include gallstones, liver disease, alcohol abuse, PUD, gastric bypass, bowel resection, irritable bowel syndrome, hemorrhoids or diverticulitis (diverticulosis). Associated medical issues comments: History fatty pancreas; denies family history of colon cancer but does report family history of UC in son.     IBD History:   IBD disease: Ulcerative colitis (suspect to be Crohn's disease with granulomas on biopsies of colon)  Disease location: colonic  Disease behavior: Inflammatory  Current therapy:  Entyvio 300 mg every 4 weeks monotherapy  Prior therapy: Sulfasalazine (effective, but caused headaches and skin rash), mesalamine (ineffective), steroids  Surgeries: None  Complications:None  Extraintestinal manifestations: None    Review of Systems   Constitutional:  Positive for activity change, appetite change  (decreased) and fatigue (Attributes fatigue to chemo for pancreatic cancer). Negative for chills, diaphoresis, fever, unexpected weight change and weight loss.   HENT:  Negative for sore throat and trouble swallowing.    Respiratory:  Negative for cough, choking and shortness of breath.    Cardiovascular:  Negative for chest pain.   Gastrointestinal:  Positive for abdominal pain and diarrhea. Negative for abdominal distention, anal bleeding, bloating, blood in stool, constipation, dysphagia, hematemesis, hematochezia, jaundice, melena, nausea, rectal pain and vomiting.   Genitourinary:  Negative for dysuria.       No LMP for male patient.  Past Medical History:   Diagnosis Date    Anticoagulant long-term use     Arthritis     Asthma     B12 deficiency     Cholelithiasis     Chronic back pain     Chronic diarrhea     Colon polyp     COPD (chronic obstructive pulmonary disease)     Diverticulosis     Full dentures     GERD (gastroesophageal reflux disease)     Glaucoma     left eye    History of fall     Hyperlipemia     JUAN DIEGO (obstructive sleep apnea)     denies CPAP use    Skin cancer     Basal cell    Ulcerative colitis 1961    Vitamin D deficiency     Wrist fracture 11/2024     Past Surgical History:   Procedure Laterality Date    BACK SURGERY      CATARACT EXTRACTION W/ INTRAOCULAR LENS IMPLANT Right     COLONOSCOPY  2014    COLONOSCOPY  03/25/2015    diverticulosis, otherwise normal findings, biopsies taken from 4 quadrants- see media section for biopsy report, repeat in 1 year    COLONOSCOPY N/A 06/01/2016    Procedure: COLONOSCOPY;  Surgeon: Ravindra Ervin MD;  Location: [facility];  Service: Endoscopy;  Laterality: N/A;    COLONOSCOPY N/A 09/01/2022    Procedure: COLONOSCOPY;  Surgeon: Lencho Ivory MD;  Location: [facility];  Service: Endoscopy;  Laterality: N/A;    COLONOSCOPY N/A 08/18/2023    Procedure: COLONOSCOPY;  Surgeon: Lencho Ivory MD;  Location: [facility];  Service: Endoscopy;   Laterality: N/A;    COLONOSCOPY N/A 2024    Procedure: COLONOSCOPY;  Surgeon: Ravindra Ervin MD;  Location: [facility] ENDO;  Service: Gastroenterology;  Laterality: N/A;    CORONARY STENT PLACEMENT  2016    HERNIA REPAIR      rt inguinal    INSERTION OF TUNNELED CENTRAL VENOUS CATHETER (CVC) WITH SUBCUTANEOUS PORT Right 1/3/2025    Procedure: [id number];  Surgeon: Adiel Alejo MD;  Location: [facility] OR;  Service: General;  Laterality: Right;    LUMBAR FUSION      L3,4,5    MULTIPLE TOOTH EXTRACTIONS      pain pump placement  2010    dilaudid/baclofen    ROBOTIC BRONCHOSCOPY Bilateral 2024    Procedure: ROBOTIC BRONCHOSCOPY;  Surgeon: Gregg White MD;  Location: [facility] OR;  Service: Pulmonary;  Laterality: Bilateral;    SKIN BIOPSY      SKIN CANCER EXCISION      UPPER GASTROINTESTINAL ENDOSCOPY  10/26/2009    hiatal hernia     Family History   Problem Relation Name Age of Onset    Liver cancer Sister  83    Pancreatic cancer Brother  54    Stroke Mother          X2    Heart attack Father          X3    Alzheimer's disease Maternal Grandfather      Ulcerative colitis Son  20 - 29    Multiple sclerosis Brother  37    Heart attack Brother  54    Colon cancer Neg Hx      Colon polyps Neg Hx       Social History     Tobacco Use    Smoking status: Former     Current packs/day: 0.00     Types: Cigarettes     Quit date:      Years since quittin.3     Passive exposure: Past    Smokeless tobacco: Never   Substance Use Topics    Alcohol use: No    Drug use: No     Wt Readings from Last 10 Encounters:   25 76 kg (167 lb 8.8 oz)   25 76 kg (167 lb 8.8 oz)   25 76.6 kg (168 lb 14 oz)   25 76.2 kg (167 lb 15.9 oz)   25 76.9 kg (169 lb 8.5 oz)   25 77.7 kg (171 lb 4.8 oz)   25 80.9 kg (178 lb 5.6 oz)   25 78.8 kg (173 lb 11.6 oz)   25 79.3 kg (174 lb 13.2 oz)   25 79.3 kg (174 lb 13.2 oz)     Lab Results   Component Value Date     WBC 7.77 05/13/2025    HGB 8.8 (L) 05/13/2025    HCT 26.9 (L) 05/13/2025    MCV 96 05/13/2025     05/13/2025     CMP  Sodium   Date Value Ref Range Status   05/13/2025 138 136 - 145 mmol/L Final   03/18/2025 136 136 - 145 mmol/L Final     Potassium   Date Value Ref Range Status   05/13/2025 5.1 3.5 - 5.1 mmol/L Final   03/18/2025 4.0 3.5 - 5.1 mmol/L Final     Comment:     Anion Gap reference range revised on 4/28/2023     Chloride   Date Value Ref Range Status   05/13/2025 104 95 - 110 mmol/L Final   03/18/2025 108 95 - 110 mmol/L Final     CO2   Date Value Ref Range Status   05/13/2025 27 23 - 29 mmol/L Final   03/18/2025 21 (L) 23 - 29 mmol/L Final     Glucose   Date Value Ref Range Status   05/13/2025 115 (H) 70 - 110 mg/dL Final   03/18/2025 89 70 - 110 mg/dL Final     Comment:     The ADA recommends the following guidelines for fasting glucose:    Normal:       less than 100 mg/dL    Prediabetes:  100 mg/dL to 125 mg/dL    Diabetes:     126 mg/dL or higher       BUN   Date Value Ref Range Status   05/13/2025 26 (H) 8 - 23 mg/dL Final     Creatinine   Date Value Ref Range Status   05/13/2025 1.5 (H) 0.5 - 1.4 mg/dL Final     Calcium   Date Value Ref Range Status   05/13/2025 9.0 8.7 - 10.5 mg/dL Final   03/18/2025 8.1 (L) 8.7 - 10.5 mg/dL Final     Protein Total   Date Value Ref Range Status   05/13/2025 7.3 6.0 - 8.4 gm/dL Final     Total Protein   Date Value Ref Range Status   03/18/2025 5.7 (L) 6.0 - 8.4 g/dL Final     Albumin   Date Value Ref Range Status   05/13/2025 3.5 3.5 - 5.2 g/dL Final   03/18/2025 2.5 (L) 3.5 - 5.2 g/dL Final     Total Bilirubin   Date Value Ref Range Status   03/18/2025 0.7 0.2 - 1.0 mg/dL Final     Bilirubin Total   Date Value Ref Range Status   05/13/2025 0.5 0.1 - 1.0 mg/dL Final     Comment:     For infants and newborns, interpretation of results should be based   on gestational age, weight and in agreement with clinical   observations.    Premature Infant recommended  reference ranges:   0-24 hours:  <8.0 mg/dL   24-48 hours: <12.0 mg/dL   3-5 days:    <15.0 mg/dL   6-29 days:   <15.0 mg/dL     Alkaline Phosphatase   Date Value Ref Range Status   03/18/2025 207 (H) 40 - 150 U/L Final     ALP   Date Value Ref Range Status   05/13/2025 62 40 - 150 unit/L Final     AST   Date Value Ref Range Status   05/13/2025 26 11 - 45 unit/L Final   03/18/2025 68 (H) 10 - 40 U/L Final     ALT   Date Value Ref Range Status   05/13/2025 21 10 - 44 unit/L Final   03/18/2025 40 10 - 44 U/L Final     Anion Gap   Date Value Ref Range Status   05/13/2025 7 (L) 8 - 16 mmol/L Final     Reviewed prior medical records including radiology report of CT chest abdomen and pelvis 03/27/2025, abdominal ultrasound 01/19/2025, CT abdomen and pelvis 01/18/2025, MRI MRCP 12/11/2024, PET-CT 11/27/2024 CT abdomen and pelvis 08/29/2018 & endoscopy history (see surgical history).    Most recent abdominal imaging  CT C/A/P 03/27/25  Impression:  1. Grossly stable extensive pulmonary metastasis with small bilateral pleural effusions.  2. Mild progression of mediastinal lymphadenopathy which could represent metastasis.  3. No evidence of new distant extra thoracic metastasis.  4. Splenomegaly and prominence of multiple small venous structures throughout the abdomen raising the question of possible portal venous hypertension.  5. Cholelithiasis and continued gallbladder distension similar to prior exams.  No other secondary signs of acute cholecystitis.  6. Severe diffuse fatty atrophy of the pancreas with stable cystic lesions.  7. Mild wall thickening of the descending and sigmoid colon without significant pericolonic inflammatory changes, nonspecific however could represent mild colitis.  Recommend clinical correlation.  8. Additional findings as above.    Colonoscopy 07/25/24  - Diverticulosis in the entire examined colon.  The entire examined colon is normal. Biopsied  Patho:  COLON, RANDOM BIOPSY:   - COLONIC MUCOSA  WITH MILD ACTIVITY AND POORLY FORMED NON-NECROTIZING    GRANULOMA.   - NEGATIVE FOR EVIDENCE OF CHRONICITY.     Objective:      Physical Exam  Vitals and nursing note reviewed.   Constitutional:       General: He is not in acute distress.     Appearance: Normal appearance. He is not ill-appearing.   HENT:      Mouth/Throat:      Lips: Pink. No lesions.   Cardiovascular:      Rate and Rhythm: Normal rate and regular rhythm.      Heart sounds: Normal heart sounds.   Pulmonary:      Effort: Pulmonary effort is normal. No respiratory distress.      Breath sounds: Normal breath sounds.   Abdominal:      General: Bowel sounds are normal. There is no distension or abdominal bruit. There are no signs of injury.      Palpations: Abdomen is soft. There is no shifting dullness, fluid wave, hepatomegaly, splenomegaly or mass.      Tenderness: There is no abdominal tenderness. There is no guarding or rebound. Negative signs include Corrigan's sign, Rovsing's sign and McBurney's sign.   Skin:     General: Skin is warm and dry.      Coloration: Skin is not jaundiced or pale.   Neurological:      Mental Status: He is alert and oriented to person, place, and time.   Psychiatric:         Attention and Perception: Attention normal.         Mood and Affect: Mood normal.         Speech: Speech normal.         Behavior: Behavior normal.         Assessment:       1. Inflammatory bowel diseases (IBD)    2. Chronic diarrhea    3. History of Clostridium difficile infection    4. Abdominal cramping    5. Chronic GERD    6. Malignant neoplasm of body of pancreas    7. Pancreatic insufficiency    8. Current use of anticoagulant therapy          Plan:       Inflammatory bowel diseases (IBD), Chronic diarrhea, & History of Clostridium difficile infection, & Abdominal cramping  - follow-up with MD to discuss alternative medication for treatment of IBD  - drug level prior to next Entyvio infusion today  - Continue Entyvio 300 mg every 4 weeks for  now  - Continue Questran 4 gm b.I.d.  - Continue OTC Imodium PRN  - recommend OTC probiotic, such as Florastor or Culturelle, taken as directed on packaging  - avoid lactose, alcohol, & caffeine  - avoid known triggers  -     Giardia / Cryptosporidum, EIA; Future  -     Stool Exam-Ova,Cysts,Parasites; Future  -     Clostridium difficile EIA; Future  -     Stool culture; Future  -     Pancreatic elastase, fecal; Future; Expected date: 05/14/2025  -     Calprotectin, Stool; Future  -     Vedolizumab and Anti-Vedolizumab Ab; Future; Expected date: 05/14/2025  -     C-reactive protein; Future; Expected date: 05/14/2025  - consider repeating colonoscopy pending MD recommendations    Chronic GERD  -Avoid large meals, avoid eating within 2-3 hours of bedtime (avoid late night eating & lying down soon after eating), elevate head of bed if nocturnal symptoms are present, smoking cessation (if current smoker), & weight loss (if overweight).   -Avoid known foods which trigger reflux symptoms & to minimize/avoid high-fat foods, chocolate, caffeine, citrus, alcohol, & tomato products.  -Avoid/limit use of NSAID's, since they can cause GI upset, bleeding, and/or ulcers. If needed, take with food.  - continue famotidine 40 mg once daily    Malignant neoplasm of body of pancreas & Pancreatic insufficiency   - follow-up with Oncology for continued evaluation and management  - continue Creon as prescribed    Current use of anticoagulant therapy     Health Maintenance: recommended patient follow-up with PCP for health maintenance testing/immunizations   -COVID: UTD; recommend boosters when available  -Prevnar 13: UTD  -Pneumovax 23: UTD  -Flu Shot: Recommend annually  -Shingrix: Recommend    -Hepatitis A/Hepatitis B: Not immune, could consider vaccination  -Annual skin exam: Recommend   -Colon cancer screening: Risks factors: >1/3 of colon involved with colitis and >8-10 years of IBD duration, Distribution of colonic disease:  pan-colonic  , Year of symptom onset: >60 years ; Colonoscopy: every 1-2 years for surveillance once in endoscopic remission  -DEXA scan: Recommend  -Tobacco: Avoid  -Should avoid NSAIDs and narcotics, use only Tylenol for pain relief.     Follow up in about 2 months (around 7/14/2025), or if symptoms worsen or fail to improve.      If no improvement in symptoms or symptoms worsen, call/follow-up at clinic or go to ER.        Total time spent on the encounter includes face to face time and non-face to face time preparing to see the patient (eg, review of tests), Obtaining and/or reviewing separately obtained history, Documenting clinical information in the electronic or other health record, Independently interpreting results (not separately reported) and communicating results to the patient/family/caregiver, or Care coordination (not separately reported).     A dictation software program was used for this note. Please expect some simple typographical  errors in this note.

## 2025-05-14 NOTE — PROGRESS NOTES
"Oncology Nutrition   Chemotherapy Infusion Visit    Nutrition Follow Up   RD met with pt at chairside during C3D15 Abraxane/Gemzar/OBI/entyvio. Pt continues to have diarrhea. Pt tells RD he has had UC since he was 22 y/o. Pt taking 2 Creon with breakfast, 1 Creon with lunch, and 2 with dinner. He states adding Creon has not helped diarrhea. Pt is taking imodium after every loose bowel movement up to 5x's/day. RD suggest pt try Enterade for diarrhea. Provided pt with samples and instructions. Pt states "I will try anything". Pt with 7# or 4% weight loss in 2 months - moderate loss.     Wt Readings from Last 10 Encounters:   05/14/25 76 kg (167 lb 8.8 oz)   05/14/25 76 kg (167 lb 8.8 oz)   05/13/25 76.6 kg (168 lb 14 oz)   04/29/25 76.2 kg (167 lb 15.9 oz)   04/23/25 76.9 kg (169 lb 8.5 oz)   04/08/25 77.7 kg (171 lb 4.8 oz)   03/26/25 80.9 kg (178 lb 5.6 oz)   03/17/25 78.8 kg (173 lb 11.6 oz)   03/13/25 79.3 kg (174 lb 13.2 oz)   03/12/25 79.3 kg (174 lb 13.2 oz)       All other nutrition questions/concerns addressed as appropriate. Will continue to monitor prn throughout treatment.     Abbie Herring, ALEXUS, LDN  05/14/2025  3:30 PM         "

## 2025-05-15 ENCOUNTER — RESULTS FOLLOW-UP (OUTPATIENT)
Dept: GASTROENTEROLOGY | Facility: CLINIC | Age: 85
End: 2025-05-15
Payer: OTHER GOVERNMENT

## 2025-05-15 ENCOUNTER — LAB VISIT (OUTPATIENT)
Dept: LAB | Facility: HOSPITAL | Age: 85
End: 2025-05-15
Attending: INTERNAL MEDICINE
Payer: OTHER GOVERNMENT

## 2025-05-15 DIAGNOSIS — K52.9 INFLAMMATORY BOWEL DISEASES (IBD): ICD-10-CM

## 2025-05-15 PROCEDURE — 87046 STOOL CULTR AEROBIC BACT EA: CPT | Mod: 91

## 2025-05-15 PROCEDURE — 82653 EL-1 FECAL QUANTITATIVE: CPT

## 2025-05-15 PROCEDURE — 87329 GIARDIA AG IA: CPT

## 2025-05-15 PROCEDURE — 87046 STOOL CULTR AEROBIC BACT EA: CPT

## 2025-05-15 PROCEDURE — 87427 SHIGA-LIKE TOXIN AG IA: CPT

## 2025-05-15 PROCEDURE — 87177 OVA AND PARASITES SMEARS: CPT

## 2025-05-16 LAB
CRYPTOSP AG SPEC QL: NEGATIVE
G LAMBLIA AG STL QL IA: NEGATIVE

## 2025-05-17 LAB — C COLI+JEJ+UPSA DNA STL QL NAA+NON-PROBE: NEGATIVE

## 2025-05-18 LAB
E COLI SXT1 STL QL IA: NEGATIVE
E COLI SXT2 STL QL IA: NEGATIVE

## 2025-05-19 LAB
BACTERIA STL CULT: NORMAL
CALPROTECTIN INTERP (OHS): ABNORMAL
CALPROTECTIN STOOL (OHS): 79.1 ΜG/G
ELASTASE, STOOL INTERPRETATION (OHS): ABNORMAL
PANCREATIC ELASTASE, FECAL (OHS): 19.1 ΜG/G

## 2025-05-21 ENCOUNTER — PATIENT MESSAGE (OUTPATIENT)
Dept: HEMATOLOGY/ONCOLOGY | Facility: CLINIC | Age: 85
End: 2025-05-21
Payer: OTHER GOVERNMENT

## 2025-05-22 LAB
CLINICAL BIOCHEMIST REVIEW: ABNORMAL
O+P STL MICRO: NORMAL
VEDOLIZUMAB AB SERPL IA-MCNC: <9.8 NG/ML
VEDOLIZUMAB TROUGH SERPL LC/MS/MS-MCNC: 6.1 MCG/ML

## 2025-05-22 NOTE — TELEPHONE ENCOUNTER
Spoke with patient.   He wants to stop all chemo treatments. He wants to continue with entyvio.   He will be following up with his pcp----and he knows to contact the clinic with any changes in his mind.     Message routed to infusion/dr pedraza

## (undated) DEVICE — DRAPE C ARM 42 X 120 10/BX

## (undated) DEVICE — SYS EXOFIN SKIN CLOSURE 22CM

## (undated) DEVICE — SUT MCRYL PLUS 4-0 PS2 27IN

## (undated) DEVICE — ELECTRODE REM PLYHSV RETURN 9

## (undated) DEVICE — SUT VICRYL PLUS 3-0 SH 18IN

## (undated) DEVICE — DECANTER FLUID TRNSF WHITE 9IN

## (undated) DEVICE — SYR 10CC LUER LOCK

## (undated) DEVICE — GLOVE SENSICARE PI SLT 7.5

## (undated) DEVICE — GLOVE SENSICARE PI MICRO 7.5

## (undated) DEVICE — ELECTRODE BLD 1 INCH TEFLON

## (undated) DEVICE — BLADE SURG CARBON STEEL SZ11

## (undated) DEVICE — DRAPE T TRNSVRS LAP 102X78X121

## (undated) DEVICE — NDL SAFETY 25G X 1.5 ECLIPSE

## (undated) DEVICE — COVER LIGHT HANDLE 80/CA

## (undated) DEVICE — SOL NACL IRR 1000ML BTL

## (undated) DEVICE — COVER CAMERA OPERATING ROOM